# Patient Record
Sex: FEMALE | Race: WHITE | NOT HISPANIC OR LATINO | Employment: OTHER | URBAN - METROPOLITAN AREA
[De-identification: names, ages, dates, MRNs, and addresses within clinical notes are randomized per-mention and may not be internally consistent; named-entity substitution may affect disease eponyms.]

---

## 2017-02-08 ENCOUNTER — OFFICE VISIT (OUTPATIENT)
Dept: FAMILY MEDICINE CLINIC | Facility: CLINIC | Age: 77
End: 2017-02-08

## 2017-02-08 VITALS
HEIGHT: 63 IN | SYSTOLIC BLOOD PRESSURE: 142 MMHG | HEART RATE: 56 BPM | BODY MASS INDEX: 41.29 KG/M2 | TEMPERATURE: 97.9 F | DIASTOLIC BLOOD PRESSURE: 82 MMHG | OXYGEN SATURATION: 97 % | WEIGHT: 233 LBS

## 2017-02-08 DIAGNOSIS — Z74.09 IMPAIRED MOBILITY: ICD-10-CM

## 2017-02-08 DIAGNOSIS — M15.9 GENERALIZED OSTEOARTHRITIS: ICD-10-CM

## 2017-02-08 DIAGNOSIS — E78.5 HYPERLIPIDEMIA, UNSPECIFIED HYPERLIPIDEMIA TYPE: ICD-10-CM

## 2017-02-08 DIAGNOSIS — I10 BENIGN HYPERTENSION: Primary | ICD-10-CM

## 2017-02-08 DIAGNOSIS — E11.9 TYPE 2 DIABETES MELLITUS WITHOUT COMPLICATION, WITHOUT LONG-TERM CURRENT USE OF INSULIN (HCC): ICD-10-CM

## 2017-02-08 DIAGNOSIS — E66.9 ADIPOSITY: ICD-10-CM

## 2017-02-08 DIAGNOSIS — G14 POST POLIOMYELITIS SYNDROME: ICD-10-CM

## 2017-02-08 DIAGNOSIS — E03.9 ACQUIRED HYPOTHYROIDISM: ICD-10-CM

## 2017-02-08 PROCEDURE — G0009 ADMIN PNEUMOCOCCAL VACCINE: HCPCS | Performed by: INTERNAL MEDICINE

## 2017-02-08 PROCEDURE — 90670 PCV13 VACCINE IM: CPT | Performed by: INTERNAL MEDICINE

## 2017-02-08 PROCEDURE — 99203 OFFICE O/P NEW LOW 30 MIN: CPT | Performed by: INTERNAL MEDICINE

## 2017-02-08 PROCEDURE — 85025 COMPLETE CBC W/AUTO DIFF WBC: CPT | Performed by: INTERNAL MEDICINE

## 2017-02-08 RX ORDER — GLIPIZIDE 10 MG/1
10 TABLET ORAL 3 TIMES DAILY
COMMUNITY
Start: 2016-06-07 | End: 2017-03-29

## 2017-02-08 RX ORDER — ATENOLOL 25 MG/1
25 TABLET ORAL 2 TIMES DAILY
COMMUNITY
Start: 2016-01-11 | End: 2018-01-24 | Stop reason: SDUPTHER

## 2017-02-08 RX ORDER — SIMVASTATIN 40 MG
40 TABLET ORAL NIGHTLY
COMMUNITY
Start: 2016-01-11 | End: 2018-01-24 | Stop reason: SDUPTHER

## 2017-02-08 RX ORDER — LOSARTAN POTASSIUM AND HYDROCHLOROTHIAZIDE 12.5; 5 MG/1; MG/1
1 TABLET ORAL DAILY
COMMUNITY
Start: 2016-06-06 | End: 2018-02-12 | Stop reason: SDUPTHER

## 2017-02-08 RX ORDER — BACLOFEN 10 MG/1
10 TABLET ORAL 2 TIMES DAILY
COMMUNITY
End: 2017-02-08

## 2017-02-08 RX ORDER — TRAMADOL HYDROCHLORIDE 50 MG/1
50 TABLET ORAL 2 TIMES DAILY
COMMUNITY
Start: 2015-06-29 | End: 2018-01-24

## 2017-02-08 RX ORDER — PIOGLITAZONEHYDROCHLORIDE 30 MG/1
30 TABLET ORAL DAILY
Qty: 90 TABLET | Refills: 3 | Status: SHIPPED | OUTPATIENT
Start: 2017-02-08 | End: 2017-12-20 | Stop reason: SDUPTHER

## 2017-02-08 RX ORDER — LEVOTHYROXINE SODIUM 0.05 MG/1
50 TABLET ORAL DAILY
COMMUNITY
Start: 2017-01-03 | End: 2018-07-05 | Stop reason: SDUPTHER

## 2017-02-08 RX ORDER — METFORMIN HYDROCHLORIDE 500 MG/1
500 TABLET, EXTENDED RELEASE ORAL 2 TIMES DAILY
COMMUNITY
Start: 2016-03-20 | End: 2018-01-24 | Stop reason: DRUGHIGH

## 2017-02-08 RX ORDER — DIPHENOXYLATE HYDROCHLORIDE AND ATROPINE SULFATE 2.5; .025 MG/1; MG/1
1 TABLET ORAL EVERY 24 HOURS
COMMUNITY
Start: 2016-10-05 | End: 2018-01-24

## 2017-02-08 NOTE — PROGRESS NOTES
Subjective   Alice Parisi is a 76 y.o. female. Patient is here today for establishing care as a new patient.  She formerly was living at home and having medical care supplied by [facility]  visiting her in the home about once a year as well as a nurse practitioner.  That services stopped and she is establishing care here.  She has moved in with her daughter.  Patient has a history of hypertension, obesity, Hyperlipidemia, diabetes mellitus, hypothyroidism, arthritis.  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  She also has obstructive sleep apnea.  She is really not having any major new acute problems.  She does have a muscle relaxant that's not very helpful and has a pain pill that she takes rarely   Chief Complaint   Patient presents with   • Annual Exam   • Establish Care     New Pt           Vitals:    02/08/17 0847   BP: 142/82   Pulse: 56   Temp: 97.9 °F (36.6 °C)   SpO2: 97%     The following portions of the patient's history were reviewed and updated as appropriate: allergies, current medications, past family history, past medical history, past social history, past surgical history and problem list.    Past Medical History   Diagnosis Date   • Arthritis    • Diabetes mellitus    • Hyperlipidemia    • Hypertension    • Incontinence    • Obesity    • Sleep apnea       No Known Allergies   Social History     Social History   • Marital status:      Spouse name: N/A   • Number of children: N/A   • Years of education: N/A     Occupational History   • Not on file.     Social History Main Topics   • Smoking status: Not on file   • Smokeless tobacco: Not on file   • Alcohol use Not on file   • Drug use: Not on file   • Sexual activity: Not on file     Other Topics Concern   • Not on file     Social History Narrative   • No narrative on file        Current Outpatient Prescriptions:   •  atenolol (TENORMIN) 25 MG tablet, Take 25 mg by mouth 2 (Two) Times a  Day., Disp: , Rfl:   •  diphenoxylate-atropine (LOMOTIL) 2.5-0.025 MG per tablet, Take 1 tablet by mouth Daily., Disp: , Rfl:   •  glipiZIDE (GLUCOTROL) 10 MG tablet, Take 10 mg by mouth 3 (Three) Times a Day., Disp: , Rfl:   •  levothyroxine (SYNTHROID, LEVOTHROID) 50 MCG tablet, Take 50 mcg by mouth Daily., Disp: , Rfl:   •  losartan-hydrochlorothiazide (HYZAAR) 50-12.5 MG per tablet, Take 1 tablet by mouth Daily., Disp: , Rfl:   •  metFORMIN ER (GLUCOPHAGE-XR) 500 MG 24 hr tablet, Take 500 mg by mouth 2 (Two) Times a Day., Disp: , Rfl:   •  simvastatin (ZOCOR) 40 MG tablet, Take 40 mg by mouth Every Night., Disp: , Rfl:   •  traMADol (ULTRAM) 50 MG tablet, Take 50 mg by mouth 2 (Two) Times a Day., Disp: , Rfl:   •  pioglitazone (ACTOS) 30 MG tablet, Take 1 tablet by mouth Daily., Disp: 90 tablet, Rfl: 3     Objective     History of Present Illness     Review of Systems   Constitutional: Negative.    HENT: Negative.    Eyes: Negative.    Respiratory: Negative.    Cardiovascular: Negative.    Gastrointestinal: Negative.    Genitourinary: Negative.    Musculoskeletal: Negative.    Skin: Negative.    Neurological: Negative.    Psychiatric/Behavioral: Negative.        Physical Exam   Constitutional: She is oriented to person, place, and time. She appears well-developed and well-nourished.   Pleasant, cooperative but obese and in a wheelchair with a blood pressure 120/80   HENT:   Head: Normocephalic and atraumatic.   Eyes: Conjunctivae are normal. Pupils are equal, round, and reactive to light.   Neck: Normal range of motion. Neck supple. No thyromegaly present.   Cardiovascular: Normal rate, regular rhythm and normal heart sounds.    Pulmonary/Chest: Effort normal and breath sounds normal. No respiratory distress. She has no wheezes. She has no rales.   Abdominal: Soft.   Musculoskeletal: Normal range of motion. She exhibits deformity.   Left leg muscle atrophy from polio as a child   Neurological: She is alert and  oriented to person, place, and time.   Skin: Skin is warm and dry.   Psychiatric: She has a normal mood and affect. Her behavior is normal.   Nursing note and vitals reviewed.      ASSESSMENT  the patient seems stable but has number of previously diagnosed problems.  I'm going to get some laboratory testing started today.       Problem List Items Addressed This Visit        Cardiovascular and Mediastinum    Benign hypertension - Primary    Relevant Medications    atenolol (TENORMIN) 25 MG tablet    losartan-hydrochlorothiazide (HYZAAR) 50-12.5 MG per tablet    Other Relevant Orders    POC CBC With / Auto Diff    Comprehensive Metabolic Panel    Lipid Panel With LDL / HDL Ratio       Digestive    Adiposity       Endocrine    Type 2 diabetes mellitus    Relevant Medications    glipiZIDE (GLUCOTROL) 10 MG tablet    metFORMIN ER (GLUCOPHAGE-XR) 500 MG 24 hr tablet    pioglitazone (ACTOS) 30 MG tablet    Other Relevant Orders    Lipid Panel With LDL / HDL Ratio    Hemoglobin A1c    Microalbumin / Creatinine Urine Ratio    Hypothyroidism    Relevant Medications    levothyroxine (SYNTHROID, LEVOTHROID) 50 MCG tablet    atenolol (TENORMIN) 25 MG tablet    Other Relevant Orders    Lipid Panel With LDL / HDL Ratio    TSH    T4, Free       Nervous and Auditory    Post poliomyelitis syndrome       Musculoskeletal and Integument    Generalized osteoarthritis       Other    Impaired mobility     In wheelchair               PLAN  The patient will continue her current medicines but can stop the baclofen.  She will continue her other diabetes medicines but I'm going to add in some pioglitazone 30 mg daily.  I would like to get her off the glipizide if it's feasible.  Patient will also receive a Prevnar 13 vaccination today and I plan on rechecking her in about 6 weeks with a CMP and hemoglobin A1c      There are no Patient Instructions on file for this visit.  No Follow-up on file.

## 2017-02-09 LAB
ALBUMIN SERPL-MCNC: 3.9 G/DL (ref 3.5–5.2)
ALBUMIN/CREAT UR: 79.4 MG/G CREAT (ref 0–30)
ALBUMIN/GLOB SERPL: 1.4 G/DL
ALP SERPL-CCNC: 66 U/L (ref 39–117)
ALT SERPL-CCNC: 25 U/L (ref 1–33)
AST SERPL-CCNC: 18 U/L (ref 1–32)
BILIRUB SERPL-MCNC: 0.4 MG/DL (ref 0.1–1.2)
BUN SERPL-MCNC: 14 MG/DL (ref 8–23)
BUN/CREAT SERPL: 28.6 (ref 7–25)
CALCIUM SERPL-MCNC: 9.8 MG/DL (ref 8.6–10.5)
CHLORIDE SERPL-SCNC: 101 MMOL/L (ref 98–107)
CHOLEST SERPL-MCNC: 190 MG/DL (ref 0–200)
CO2 SERPL-SCNC: 27.5 MMOL/L (ref 22–29)
CREAT SERPL-MCNC: 0.49 MG/DL (ref 0.57–1)
CREAT UR-MCNC: 23.3 MG/DL
GLOBULIN SER CALC-MCNC: 2.8 GM/DL
GLUCOSE SERPL-MCNC: 108 MG/DL (ref 65–99)
HBA1C MFR BLD: 7.1 % (ref 4.8–5.6)
HDLC SERPL-MCNC: 65 MG/DL (ref 40–60)
LDLC SERPL CALC-MCNC: 88 MG/DL (ref 0–100)
LDLC/HDLC SERPL: 1.36 {RATIO}
MICROALBUMIN UR-MCNC: 18.5 UG/ML
POTASSIUM SERPL-SCNC: 4.3 MMOL/L (ref 3.5–5.2)
PROT SERPL-MCNC: 6.7 G/DL (ref 6–8.5)
SODIUM SERPL-SCNC: 143 MMOL/L (ref 136–145)
T4 FREE SERPL-MCNC: 1.27 NG/DL (ref 0.93–1.7)
TRIGL SERPL-MCNC: 184 MG/DL (ref 0–150)
TSH SERPL DL<=0.005 MIU/L-ACNC: 2.11 MIU/ML (ref 0.27–4.2)
VLDLC SERPL CALC-MCNC: 36.8 MG/DL (ref 5–40)

## 2017-03-13 DIAGNOSIS — E11.9 TYPE 2 DIABETES MELLITUS WITHOUT COMPLICATION, UNSPECIFIED LONG TERM INSULIN USE STATUS: Primary | ICD-10-CM

## 2017-03-22 LAB
ALBUMIN SERPL-MCNC: 4.1 G/DL (ref 3.5–5.2)
ALBUMIN/GLOB SERPL: 1.4 G/DL
ALP SERPL-CCNC: 59 U/L (ref 39–117)
ALT SERPL-CCNC: 22 U/L (ref 1–33)
AST SERPL-CCNC: 21 U/L (ref 1–32)
BILIRUB SERPL-MCNC: 0.3 MG/DL (ref 0.1–1.2)
BUN SERPL-MCNC: 16 MG/DL (ref 8–23)
BUN/CREAT SERPL: 28.6 (ref 7–25)
CALCIUM SERPL-MCNC: 9.9 MG/DL (ref 8.6–10.5)
CHLORIDE SERPL-SCNC: 100 MMOL/L (ref 98–107)
CO2 SERPL-SCNC: 28.1 MMOL/L (ref 22–29)
CREAT SERPL-MCNC: 0.56 MG/DL (ref 0.57–1)
GLOBULIN SER CALC-MCNC: 2.9 GM/DL
GLUCOSE SERPL-MCNC: 94 MG/DL (ref 65–99)
HBA1C MFR BLD: 6.7 % (ref 4.8–5.6)
POTASSIUM SERPL-SCNC: 4.3 MMOL/L (ref 3.5–5.2)
PROT SERPL-MCNC: 7 G/DL (ref 6–8.5)
SODIUM SERPL-SCNC: 143 MMOL/L (ref 136–145)

## 2017-03-29 ENCOUNTER — OFFICE VISIT (OUTPATIENT)
Dept: FAMILY MEDICINE CLINIC | Facility: CLINIC | Age: 77
End: 2017-03-29

## 2017-03-29 VITALS
HEIGHT: 63 IN | WEIGHT: 240.4 LBS | OXYGEN SATURATION: 98 % | SYSTOLIC BLOOD PRESSURE: 138 MMHG | BODY MASS INDEX: 42.59 KG/M2 | DIASTOLIC BLOOD PRESSURE: 68 MMHG | HEART RATE: 56 BPM | TEMPERATURE: 98 F

## 2017-03-29 DIAGNOSIS — E66.9 ADIPOSITY: ICD-10-CM

## 2017-03-29 DIAGNOSIS — E11.9 TYPE 2 DIABETES MELLITUS WITHOUT COMPLICATION, WITHOUT LONG-TERM CURRENT USE OF INSULIN (HCC): ICD-10-CM

## 2017-03-29 DIAGNOSIS — I10 BENIGN HYPERTENSION: Primary | ICD-10-CM

## 2017-03-29 PROCEDURE — 99214 OFFICE O/P EST MOD 30 MIN: CPT | Performed by: INTERNAL MEDICINE

## 2017-03-29 RX ORDER — LANCETS
EACH MISCELLANEOUS
COMMUNITY
Start: 2017-01-13 | End: 2018-01-24 | Stop reason: SDUPTHER

## 2017-03-29 RX ORDER — BLOOD SUGAR DIAGNOSTIC
STRIP MISCELLANEOUS
COMMUNITY
Start: 2017-03-18 | End: 2017-08-02 | Stop reason: SDUPTHER

## 2017-03-29 NOTE — PROGRESS NOTES
Subjective   Alice Parisi is a 77 y.o. female. Patient is here today for follow-up on her hypertension, obesity, diabetes mellitus.  At her initial visit I started her on some pioglitazone.  She's been tolerating the medicine okay.      Chief Complaint   Patient presents with   • Diabetes     6 WEEK FOLLOW UP LABS          Vitals:    03/29/17 0953   BP: 138/68   Pulse: 56   Temp: 98 °F (36.7 °C)   SpO2: 98%     The following portions of the patient's history were reviewed and updated as appropriate: allergies, current medications, past family history, past medical history, past social history, past surgical history and problem list.    Past Medical History:   Diagnosis Date   • Arthritis    • Diabetes mellitus    • Hyperlipidemia    • Hypertension    • Incontinence    • Obesity    • Sleep apnea       No Known Allergies   Social History     Social History   • Marital status:      Spouse name: N/A   • Number of children: N/A   • Years of education: N/A     Occupational History   • Not on file.     Social History Main Topics   • Smoking status: Never Smoker   • Smokeless tobacco: Never Used   • Alcohol use No   • Drug use: Defer   • Sexual activity: Defer     Other Topics Concern   • Not on file     Social History Narrative   • No narrative on file        Current Outpatient Prescriptions:   •  ACCU-CHEK PARRIS PLUS test strip, , Disp: , Rfl:   •  ACCU-CHEK FASTCLIX LANCETS misc, , Disp: , Rfl:   •  atenolol (TENORMIN) 25 MG tablet, Take 25 mg by mouth 2 (Two) Times a Day., Disp: , Rfl:   •  diphenoxylate-atropine (LOMOTIL) 2.5-0.025 MG per tablet, Take 1 tablet by mouth Daily., Disp: , Rfl:   •  levothyroxine (SYNTHROID, LEVOTHROID) 50 MCG tablet, Take 50 mcg by mouth Daily., Disp: , Rfl:   •  losartan-hydrochlorothiazide (HYZAAR) 50-12.5 MG per tablet, Take 1 tablet by mouth Daily., Disp: , Rfl:   •  metFORMIN ER (GLUCOPHAGE-XR) 500 MG 24 hr tablet, Take 500 mg by mouth 2 (Two) Times a Day., Disp: , Rfl:   •   pioglitazone (ACTOS) 30 MG tablet, Take 1 tablet by mouth Daily., Disp: 90 tablet, Rfl: 3  •  simvastatin (ZOCOR) 40 MG tablet, Take 40 mg by mouth Every Night., Disp: , Rfl:   •  traMADol (ULTRAM) 50 MG tablet, Take 50 mg by mouth 2 (Two) Times a Day., Disp: , Rfl:      Objective     History of Present Illness     Review of Systems   Constitutional: Negative.    HENT: Negative.    Eyes: Negative.    Respiratory: Negative.    Cardiovascular: Negative.    Gastrointestinal: Negative.    Genitourinary: Negative.    Musculoskeletal: Negative.    Neurological: Negative.    Psychiatric/Behavioral: Negative.        Physical Exam   Constitutional: She appears well-developed and well-nourished.   Pleasant, cooperative and in no distress with a blood pressure of 130/70   HENT:   Head: Normocephalic and atraumatic.   Eyes: Conjunctivae are normal.   Neck: Normal range of motion. Neck supple.   Cardiovascular: Normal rate, regular rhythm and normal heart sounds.    Pulmonary/Chest: Effort normal and breath sounds normal. No respiratory distress. She has no wheezes. She has no rales.   Musculoskeletal: Normal range of motion. She exhibits no edema.   Neurological: She is alert.   Skin: Skin is warm and dry.   Psychiatric: She has a normal mood and affect. Her behavior is normal.   Nursing note and vitals reviewed.      ASSESSMENT  overall the patient seems stable.  Her blood pressures fine and heart and lungs sound fine.  She's had no breathing problems or significant edema and seems to be tolerating the pioglitazone.  Her CMP was normal as was her sugar.  Her hemoglobin A1c has improved to 6.7.     Problem List Items Addressed This Visit        Cardiovascular and Mediastinum    Benign hypertension - Primary       Digestive    Adiposity       Endocrine    Type 2 diabetes mellitus          PLAN  I'm going to have the patient stop her Glucotrol and continue the pioglitazone.  I would like to recheck her in about 4 months with a  CBC, CMP, NMR lipid panel, hemoglobin A1c, TSH and free T4    There are no Patient Instructions on file for this visit.  Return in about 4 months (around 7/29/2017) for with labs.

## 2017-04-05 ENCOUNTER — TELEPHONE (OUTPATIENT)
Dept: FAMILY MEDICINE CLINIC | Facility: CLINIC | Age: 77
End: 2017-04-05

## 2017-04-05 DIAGNOSIS — Z74.09 IMPAIRED MOBILITY: Primary | ICD-10-CM

## 2017-04-05 NOTE — TELEPHONE ENCOUNTER
ORDER HAS BEEN PUT IN FOR [facility] [place] TO EVAL PATIENT. PT IS AWARE.     ----- Message from Manju Moeller sent at 4/5/2017  1:49 PM EDT -----  EDDIE RUIZ [phone]    TALKING ABOUT A  FOR A RAMP

## 2017-07-26 DIAGNOSIS — E11.9 TYPE 2 DIABETES MELLITUS WITHOUT COMPLICATION, WITHOUT LONG-TERM CURRENT USE OF INSULIN (HCC): ICD-10-CM

## 2017-07-26 DIAGNOSIS — E03.9 ACQUIRED HYPOTHYROIDISM: ICD-10-CM

## 2017-07-26 DIAGNOSIS — E78.5 HYPERLIPIDEMIA, UNSPECIFIED HYPERLIPIDEMIA TYPE: ICD-10-CM

## 2017-08-02 DIAGNOSIS — E11.9 TYPE 2 DIABETES MELLITUS WITHOUT COMPLICATION, WITHOUT LONG-TERM CURRENT USE OF INSULIN (HCC): Primary | ICD-10-CM

## 2017-08-02 RX ORDER — BLOOD SUGAR DIAGNOSTIC
STRIP MISCELLANEOUS
Qty: 200 EACH | Refills: 3 | Status: SHIPPED | OUTPATIENT
Start: 2017-08-02 | End: 2017-08-09 | Stop reason: SDUPTHER

## 2017-08-03 LAB
ALBUMIN SERPL-MCNC: 4.3 G/DL (ref 3.5–5.2)
ALBUMIN/GLOB SERPL: 1.5 G/DL
ALP SERPL-CCNC: 54 U/L (ref 39–117)
ALT SERPL-CCNC: 27 U/L (ref 1–33)
AST SERPL-CCNC: 20 U/L (ref 1–32)
BASOPHILS # BLD AUTO: 0.04 10*3/MM3 (ref 0–0.2)
BASOPHILS NFR BLD AUTO: 0.4 % (ref 0–1.5)
BILIRUB SERPL-MCNC: 0.3 MG/DL (ref 0.1–1.2)
BUN SERPL-MCNC: 20 MG/DL (ref 8–23)
BUN/CREAT SERPL: 34.5 (ref 7–25)
CALCIUM SERPL-MCNC: 10.1 MG/DL (ref 8.6–10.5)
CHLORIDE SERPL-SCNC: 102 MMOL/L (ref 98–107)
CHOLEST SERPL-MCNC: 131 MG/DL (ref 100–199)
CO2 SERPL-SCNC: 28.1 MMOL/L (ref 22–29)
CREAT SERPL-MCNC: 0.58 MG/DL (ref 0.57–1)
DIFFERENTIAL COMMENT: NORMAL
EOSINOPHIL # BLD AUTO: 0.3 10*3/MM3 (ref 0–0.7)
EOSINOPHIL NFR BLD AUTO: 3.2 % (ref 0.3–6.2)
ERYTHROCYTE [DISTWIDTH] IN BLOOD BY AUTOMATED COUNT: 13.7 % (ref 11.7–13)
GLOBULIN SER CALC-MCNC: 2.9 GM/DL
GLUCOSE SERPL-MCNC: 110 MG/DL (ref 65–99)
HBA1C MFR BLD: 6.7 % (ref 4.8–5.6)
HCT VFR BLD AUTO: 42.9 % (ref 35.6–45.5)
HDL SERPL-SCNC: 32.9 UMOL/L
HDLC SERPL-MCNC: 56 MG/DL
HGB BLD-MCNC: 13.9 G/DL (ref 11.9–15.5)
IMM GRANULOCYTES # BLD: 0 10*3/MM3 (ref 0–0.03)
IMM GRANULOCYTES NFR BLD: 0 % (ref 0–0.5)
LDL SERPL QN: 20.6 NM
LDL SERPL-SCNC: 875 NMOL/L
LDL SMALL SERPL-SCNC: 416 NMOL/L
LDLC SERPL CALC-MCNC: 54 MG/DL (ref 0–99)
LYMPHOCYTES # BLD AUTO: 2.78 10*3/MM3 (ref 0.9–4.8)
LYMPHOCYTES NFR BLD AUTO: 29.5 % (ref 19.6–45.3)
MCH RBC QN AUTO: 31.3 PG (ref 26.9–32)
MCHC RBC AUTO-ENTMCNC: 32.4 G/DL (ref 32.4–36.3)
MCV RBC AUTO: 96.6 FL (ref 80.5–98.2)
MONOCYTES # BLD AUTO: 0.95 10*3/MM3 (ref 0.2–1.2)
MONOCYTES NFR BLD AUTO: 10.1 % (ref 5–12)
NEUTROPHILS # BLD AUTO: 5.34 10*3/MM3 (ref 1.9–8.1)
NEUTROPHILS NFR BLD AUTO: 56.8 % (ref 42.7–76)
NRBC BLD AUTO-RTO: 0 /100 WBC (ref 0–0)
PLATELET # BLD AUTO: 196 10*3/MM3 (ref 140–500)
PLATELET BLD QL SMEAR: NORMAL
POTASSIUM SERPL-SCNC: 4.3 MMOL/L (ref 3.5–5.2)
PROT SERPL-MCNC: 7.2 G/DL (ref 6–8.5)
RBC # BLD AUTO: 4.44 10*6/MM3 (ref 3.9–5.2)
RBC MORPH BLD: NORMAL
SODIUM SERPL-SCNC: 144 MMOL/L (ref 136–145)
T4 FREE SERPL-MCNC: 1.61 NG/DL (ref 0.93–1.7)
TRIGL SERPL-MCNC: 107 MG/DL (ref 0–149)
TSH SERPL DL<=0.005 MIU/L-ACNC: 1.9 MIU/ML (ref 0.27–4.2)
WBC # BLD AUTO: 9.41 10*3/MM3 (ref 4.5–10.7)

## 2017-08-09 ENCOUNTER — OFFICE VISIT (OUTPATIENT)
Dept: FAMILY MEDICINE CLINIC | Facility: CLINIC | Age: 77
End: 2017-08-09

## 2017-08-09 VITALS
BODY MASS INDEX: 40.82 KG/M2 | OXYGEN SATURATION: 98 % | WEIGHT: 230.4 LBS | TEMPERATURE: 97.6 F | HEIGHT: 63 IN | SYSTOLIC BLOOD PRESSURE: 108 MMHG | HEART RATE: 52 BPM | DIASTOLIC BLOOD PRESSURE: 66 MMHG

## 2017-08-09 DIAGNOSIS — E78.5 HYPERLIPIDEMIA, UNSPECIFIED HYPERLIPIDEMIA TYPE: Primary | ICD-10-CM

## 2017-08-09 DIAGNOSIS — E03.9 ACQUIRED HYPOTHYROIDISM: ICD-10-CM

## 2017-08-09 DIAGNOSIS — E66.9 ADIPOSITY: ICD-10-CM

## 2017-08-09 DIAGNOSIS — E11.9 TYPE 2 DIABETES MELLITUS WITHOUT COMPLICATION, WITHOUT LONG-TERM CURRENT USE OF INSULIN (HCC): ICD-10-CM

## 2017-08-09 DIAGNOSIS — I10 BENIGN HYPERTENSION: ICD-10-CM

## 2017-08-09 PROCEDURE — 99214 OFFICE O/P EST MOD 30 MIN: CPT | Performed by: INTERNAL MEDICINE

## 2017-08-09 RX ORDER — BLOOD SUGAR DIAGNOSTIC
STRIP MISCELLANEOUS
Qty: 200 EACH | Refills: 3 | Status: SHIPPED | OUTPATIENT
Start: 2017-08-09 | End: 2017-08-10 | Stop reason: SDUPTHER

## 2017-08-09 NOTE — PROGRESS NOTES
Subjective   Alice Parisi is a 77 y.o. female. Patient is here today for follow-up on her hypertension, hyperlipidemia, obesity, diabetes mellitus and hypothyroidism.  Patient's generally been doing well.  At the last visit we stopped her glipizide.  She's been watching her diet and has lost about 10 pounds thus far.  Home blood sugars have been generally about 110 fasting.  She's had no hypoglycemia.  She's had no chest pain, shortness of breath, edema or significant myalgias.    Chief Complaint   Patient presents with   • Hyperlipidemia     lab follow up           Vitals:    08/09/17 0902   BP: 108/66   Pulse: 52   Temp: 97.6 °F (36.4 °C)   SpO2: 98%     The following portions of the patient's history were reviewed and updated as appropriate: allergies, current medications, past family history, past medical history, past social history, past surgical history and problem list.    Past Medical History:   Diagnosis Date   • Arthritis    • Diabetes mellitus    • Hyperlipidemia    • Hypertension    • Incontinence    • Obesity    • Sleep apnea       No Known Allergies   Social History     Social History   • Marital status:      Spouse name: N/A   • Number of children: N/A   • Years of education: N/A     Occupational History   • Not on file.     Social History Main Topics   • Smoking status: Never Smoker   • Smokeless tobacco: Never Used   • Alcohol use No   • Drug use: Defer   • Sexual activity: Defer     Other Topics Concern   • Not on file     Social History Narrative        Current Outpatient Prescriptions:   •  ACCU-CHEK PARRIS PLUS test strip, USE TO TEST TWICE A DAY DX: TYPE 2 DIABETES MELLITUS  ICD 10: E11.9, Disp: 200 each, Rfl: 3  •  ACCU-CHEK FASTCLIX LANCETS misc, , Disp: , Rfl:   •  atenolol (TENORMIN) 25 MG tablet, Take 25 mg by mouth 2 (Two) Times a Day., Disp: , Rfl:   •  diphenoxylate-atropine (LOMOTIL) 2.5-0.025 MG per tablet, Take 1 tablet by mouth Daily., Disp: , Rfl:   •  levothyroxine  (SYNTHROID, LEVOTHROID) 50 MCG tablet, Take 50 mcg by mouth Daily., Disp: , Rfl:   •  losartan-hydrochlorothiazide (HYZAAR) 50-12.5 MG per tablet, Take 1 tablet by mouth Daily., Disp: , Rfl:   •  metFORMIN ER (GLUCOPHAGE-XR) 500 MG 24 hr tablet, Take 500 mg by mouth 2 (Two) Times a Day., Disp: , Rfl:   •  pioglitazone (ACTOS) 30 MG tablet, Take 1 tablet by mouth Daily., Disp: 90 tablet, Rfl: 3  •  simvastatin (ZOCOR) 40 MG tablet, Take 40 mg by mouth Every Night., Disp: , Rfl:   •  traMADol (ULTRAM) 50 MG tablet, Take 50 mg by mouth 2 (Two) Times a Day., Disp: , Rfl:      Objective     History of Present Illness     Review of Systems   Constitutional: Negative.    HENT: Negative.    Eyes: Negative.    Respiratory: Negative.    Cardiovascular: Negative.    Gastrointestinal: Negative.    Genitourinary: Negative.    Musculoskeletal: Negative.    Skin: Negative.    Neurological: Negative.    Psychiatric/Behavioral: Negative.        Physical Exam   Constitutional: She appears well-developed and well-nourished.   Pleasant, cooperative and in no distress with a blood pressure of 120/80   HENT:   Head: Normocephalic and atraumatic.   Eyes: Conjunctivae are normal.   Neck: Normal range of motion. Neck supple. No thyromegaly present.   Cardiovascular: Normal rate, regular rhythm and normal heart sounds.    Pulmonary/Chest: Effort normal and breath sounds normal. No respiratory distress. She has no wheezes. She has no rales.   Musculoskeletal: Normal range of motion. She exhibits no edema.   Neurological: She is alert.   Skin: Skin is warm and dry.   Psychiatric: She has a normal mood and affect. Her behavior is normal.   Nursing note and vitals reviewed.      ASSESSMENT  overall the patient generally seems stable.  Her blood pressure is fine and heart and lungs sound fine.  Patient has lost about 10 pounds in weight.  Her CBC was completely normal.  CMP had an elevated sugar of 110 but otherwise was normal and hemoglobin A1c  is stable at 6.7.  TSH and free T4 were both normal.  Her lipid panels under excellent control with a total cholesterol of 131, HDL 56 LDL 54 and the particle number in the low range.     Problem List Items Addressed This Visit        Cardiovascular and Mediastinum    Benign hypertension    Hyperlipidemia - Primary       Digestive    Adiposity       Endocrine    Type 2 diabetes mellitus    Relevant Medications    ACCU-CHEK PARRIS PLUS test strip    Hypothyroidism          PLAN  The patient will continue current medicines and I like to recheck her in 4 months with a CMP, NMR lipid panel, hemoglobin A1c and TSH    There are no Patient Instructions on file for this visit.  Return in about 4 months (around 12/9/2017) for with labs.

## 2017-08-10 DIAGNOSIS — E11.9 TYPE 2 DIABETES MELLITUS WITHOUT COMPLICATION, WITHOUT LONG-TERM CURRENT USE OF INSULIN (HCC): ICD-10-CM

## 2017-08-10 RX ORDER — BLOOD SUGAR DIAGNOSTIC
STRIP MISCELLANEOUS
Qty: 200 EACH | Refills: 3 | Status: SHIPPED | OUTPATIENT
Start: 2017-08-10 | End: 2017-08-16 | Stop reason: SDUPTHER

## 2017-08-16 DIAGNOSIS — E11.9 TYPE 2 DIABETES MELLITUS WITHOUT COMPLICATION, WITHOUT LONG-TERM CURRENT USE OF INSULIN (HCC): ICD-10-CM

## 2017-08-16 RX ORDER — BLOOD SUGAR DIAGNOSTIC
STRIP MISCELLANEOUS
Qty: 200 EACH | Refills: 3 | Status: SHIPPED | OUTPATIENT
Start: 2017-08-16 | End: 2019-05-13 | Stop reason: SDUPTHER

## 2017-12-20 RX ORDER — PIOGLITAZONEHYDROCHLORIDE 30 MG/1
TABLET ORAL
Qty: 90 TABLET | Refills: 3 | Status: SHIPPED | OUTPATIENT
Start: 2017-12-20 | End: 2018-10-17

## 2018-01-11 DIAGNOSIS — E03.9 ACQUIRED HYPOTHYROIDISM: ICD-10-CM

## 2018-01-11 DIAGNOSIS — E11.9 TYPE 2 DIABETES MELLITUS WITHOUT COMPLICATION, WITHOUT LONG-TERM CURRENT USE OF INSULIN (HCC): ICD-10-CM

## 2018-01-11 DIAGNOSIS — E78.5 HYPERLIPIDEMIA, UNSPECIFIED HYPERLIPIDEMIA TYPE: ICD-10-CM

## 2018-01-19 LAB
ALBUMIN SERPL-MCNC: 4.2 G/DL (ref 3.5–5.2)
ALBUMIN/GLOB SERPL: 1.3 G/DL
ALP SERPL-CCNC: 76 U/L (ref 39–117)
ALT SERPL-CCNC: 20 U/L (ref 1–33)
AST SERPL-CCNC: 18 U/L (ref 1–32)
BILIRUB SERPL-MCNC: 0.4 MG/DL (ref 0.1–1.2)
BUN SERPL-MCNC: 20 MG/DL (ref 8–23)
BUN/CREAT SERPL: 38.5 (ref 7–25)
CALCIUM SERPL-MCNC: 10.4 MG/DL (ref 8.6–10.5)
CHLORIDE SERPL-SCNC: 101 MMOL/L (ref 98–107)
CHOLEST SERPL-MCNC: 189 MG/DL (ref 100–199)
CO2 SERPL-SCNC: 29.2 MMOL/L (ref 22–29)
CREAT SERPL-MCNC: 0.52 MG/DL (ref 0.57–1)
GLOBULIN SER CALC-MCNC: 3.3 GM/DL
GLUCOSE SERPL-MCNC: 102 MG/DL (ref 65–99)
HBA1C MFR BLD: 5.88 % (ref 4.8–5.6)
HDL SERPL-SCNC: 32.4 UMOL/L
HDLC SERPL-MCNC: 70 MG/DL
LDL SERPL QN: 21.4 NM
LDL SERPL-SCNC: 1131 NMOL/L
LDL SMALL SERPL-SCNC: 361 NMOL/L
LDLC SERPL CALC-MCNC: 98 MG/DL (ref 0–99)
POTASSIUM SERPL-SCNC: 4.1 MMOL/L (ref 3.5–5.2)
PROT SERPL-MCNC: 7.5 G/DL (ref 6–8.5)
SODIUM SERPL-SCNC: 143 MMOL/L (ref 136–145)
TRIGL SERPL-MCNC: 106 MG/DL (ref 0–149)
TSH SERPL DL<=0.005 MIU/L-ACNC: 2.95 MIU/ML (ref 0.27–4.2)

## 2018-01-24 ENCOUNTER — OFFICE VISIT (OUTPATIENT)
Dept: FAMILY MEDICINE CLINIC | Facility: CLINIC | Age: 78
End: 2018-01-24

## 2018-01-24 VITALS
DIASTOLIC BLOOD PRESSURE: 70 MMHG | WEIGHT: 227 LBS | TEMPERATURE: 97.8 F | HEIGHT: 63 IN | OXYGEN SATURATION: 98 % | HEART RATE: 52 BPM | BODY MASS INDEX: 40.22 KG/M2 | SYSTOLIC BLOOD PRESSURE: 120 MMHG

## 2018-01-24 DIAGNOSIS — M15.9 GENERALIZED OA: ICD-10-CM

## 2018-01-24 DIAGNOSIS — E03.9 ACQUIRED HYPOTHYROIDISM: ICD-10-CM

## 2018-01-24 DIAGNOSIS — E78.5 HYPERLIPIDEMIA, UNSPECIFIED HYPERLIPIDEMIA TYPE: Primary | ICD-10-CM

## 2018-01-24 DIAGNOSIS — I10 HTN (HYPERTENSION), BENIGN: ICD-10-CM

## 2018-01-24 DIAGNOSIS — IMO0001 CLASS 3 OBESITY DUE TO EXCESS CALORIES WITH SERIOUS COMORBIDITY AND BODY MASS INDEX (BMI) OF 40.0 TO 44.9 IN ADULT: ICD-10-CM

## 2018-01-24 DIAGNOSIS — E11.9 TYPE 2 DIABETES MELLITUS WITHOUT COMPLICATION, WITHOUT LONG-TERM CURRENT USE OF INSULIN (HCC): ICD-10-CM

## 2018-01-24 PROCEDURE — 99214 OFFICE O/P EST MOD 30 MIN: CPT | Performed by: INTERNAL MEDICINE

## 2018-01-24 RX ORDER — SIMVASTATIN 40 MG
40 TABLET ORAL NIGHTLY
Qty: 90 TABLET | Refills: 3 | Status: SHIPPED | OUTPATIENT
Start: 2018-01-24 | End: 2019-02-21 | Stop reason: SDUPTHER

## 2018-01-24 RX ORDER — LANCETS
EACH MISCELLANEOUS
Qty: 200 EACH | Refills: 3 | Status: SHIPPED | OUTPATIENT
Start: 2018-01-24 | End: 2019-04-25 | Stop reason: SDUPTHER

## 2018-01-24 RX ORDER — ATENOLOL 25 MG/1
25 TABLET ORAL 2 TIMES DAILY
Qty: 180 TABLET | Refills: 3 | Status: SHIPPED | OUTPATIENT
Start: 2018-01-24 | End: 2018-12-12 | Stop reason: SDUPTHER

## 2018-01-24 NOTE — PROGRESS NOTES
Subjective   Alice Parisi is a 77 y.o. female. Patient is here today for follow-up on her hypertension, hyperlipidemia, obesity, diabetes mellitus type 2, hypothyroidism.  She generally is been stable and doing well.  She has been watching her diet.  She's been tolerating medications without any problems.   Chief Complaint   Patient presents with   • Diabetes     HLD, HTN, HYPOTHYROIDISM- FOLLOW UP LABS          Vitals:    01/24/18 1040   BP: 120/70   Pulse: 52   Temp: 97.8 °F (36.6 °C)   SpO2: 98%     The following portions of the patient's history were reviewed and updated as appropriate: allergies, current medications, past family history, past medical history, past social history, past surgical history and problem list.    Past Medical History:   Diagnosis Date   • Arthritis    • Diabetes mellitus    • Hyperlipidemia    • Hypertension    • Incontinence    • Obesity    • Sleep apnea       No Known Allergies   Social History     Social History   • Marital status:      Spouse name: N/A   • Number of children: N/A   • Years of education: N/A     Occupational History   • Not on file.     Social History Main Topics   • Smoking status: Never Smoker   • Smokeless tobacco: Never Used   • Alcohol use No   • Drug use: Defer   • Sexual activity: Defer     Other Topics Concern   • Not on file     Social History Narrative        Current Outpatient Prescriptions:   •  ACCU-CHEK PARRIS PLUS test strip, USE TO TEST TWICE A DAY DX: TYPE 2 DIABETES MELLITUS ICD 10: E11.9, Disp: 200 each, Rfl: 3  •  ACCU-CHEK FASTCLIX LANCETS misc, , Disp: , Rfl:   •  atenolol (TENORMIN) 25 MG tablet, Take 1 tablet by mouth 2 (Two) Times a Day., Disp: 180 tablet, Rfl: 3  •  levothyroxine (SYNTHROID, LEVOTHROID) 50 MCG tablet, Take 50 mcg by mouth Daily., Disp: , Rfl:   •  losartan-hydrochlorothiazide (HYZAAR) 50-12.5 MG per tablet, Take 1 tablet by mouth Daily., Disp: , Rfl:   •  metFORMIN (GLUCOPHAGE) 500 MG tablet, Take 1 tablet by mouth  Daily With Breakfast., Disp: 90 tablet, Rfl: 3  •  pioglitazone (ACTOS) 30 MG tablet, TAKE 1 TABLET DAILY, Disp: 90 tablet, Rfl: 3  •  simvastatin (ZOCOR) 40 MG tablet, Take 1 tablet by mouth Every Night., Disp: 90 tablet, Rfl: 3     Objective     History of Present Illness     Review of Systems   Constitutional: Negative.    HENT: Negative.    Eyes: Negative.    Respiratory: Negative.    Cardiovascular: Negative.    Gastrointestinal: Negative.    Endocrine: Negative.    Genitourinary: Negative.    Musculoskeletal: Positive for gait problem.        She has arthritis, obesity and post polio syndrome and uses a wheelchair   Skin: Negative.    Psychiatric/Behavioral: Negative.        Physical Exam   Constitutional: She is oriented to person, place, and time. She appears well-developed and well-nourished.   Pleasant, cooperative no distress and afebrile   HENT:   Head: Normocephalic and atraumatic.   Eyes: Conjunctivae are normal. Pupils are equal, round, and reactive to light. No scleral icterus.   Neck: Normal range of motion. Neck supple. No thyromegaly present.   Cardiovascular: Normal rate, regular rhythm and normal heart sounds.    Pulmonary/Chest: Effort normal and breath sounds normal. No respiratory distress. She has no wheezes. She has no rales.   Musculoskeletal: Normal range of motion. She exhibits no edema.   Lymphadenopathy:     She has no cervical adenopathy.   Neurological: She is alert and oriented to person, place, and time.   Skin: Skin is warm and dry.   Psychiatric: She has a normal mood and affect. Her behavior is normal.   Nursing note and vitals reviewed.      ASSESSMENT  CMP has a sugar of 102 and otherwise is essentially normal.  Hemoglobin A1c is much improved at 5.88 and acceptable.  TSH was quite normal.  NMR lipid panels acceptable with a total cholesterol 189, HDL of 70, LDL 98 with the particle number in the moderate range 1131.  #1-hypertension, reasonable control  #2-hyperlipidemia,  adequately controlled on medication  #3-diabetes mellitus type 2, good control on medicines  #4-hypothyroidism, euthyroid on replacement  #5-obesity, stable     Problem List Items Addressed This Visit        Cardiovascular and Mediastinum    HTN (hypertension), benign    Relevant Medications    atenolol (TENORMIN) 25 MG tablet    Hyperlipidemia - Primary    Relevant Medications    simvastatin (ZOCOR) 40 MG tablet       Digestive    Obesity       Endocrine    DM2 (diabetes mellitus, type 2)    Relevant Medications    metFORMIN (GLUCOPHAGE) 500 MG tablet    Hypothyroid    Relevant Medications    atenolol (TENORMIN) 25 MG tablet       Musculoskeletal and Integument    Generalized OA          PLAN  The patient will continue her current medicines as now.  I'd like to recheck her in 4 months with a CMP, NMR lipid panel, hemoglobin A1c and TSH      There are no Patient Instructions on file for this visit.  Return in about 4 months (around 5/24/2018) for with labs.

## 2018-02-12 RX ORDER — LOSARTAN POTASSIUM AND HYDROCHLOROTHIAZIDE 12.5; 5 MG/1; MG/1
1 TABLET ORAL DAILY
Qty: 90 TABLET | Refills: 3 | Status: SHIPPED | OUTPATIENT
Start: 2018-02-12 | End: 2019-02-21 | Stop reason: SDUPTHER

## 2018-03-06 ENCOUNTER — TELEPHONE (OUTPATIENT)
Dept: FAMILY MEDICINE CLINIC | Facility: CLINIC | Age: 78
End: 2018-03-06

## 2018-03-06 NOTE — TELEPHONE ENCOUNTER
CALLED AND S/W PT AND ADVISED WHAT DR. MILLAN SAID. PT VOICED UNDERSTANDING.     ----- Message from Geronimo Millan MD sent at 3/6/2018  8:06 AM EST -----  Contact: PATIENT  Elevate and avoid salt.  Needs to be seen if persists.        ----- Message -----     From: Neelima Abraham MA     Sent: 3/5/2018  11:35 AM       To: MD DR. MASON Lobo,  PATIENT IS HAVING SWELLING AND PAIN IN HER RIGHT FOOT, BELIEVES WATER RETENTION- AND SHE CAN'T COME IN FOR AN APPOINTMENT. SHE IS WONDERING WHAT SHE SHOULD DO FOR THIS SINCE SHE CAN'T COME IN FOR APPT? PLEASE ADVISE. THANK YOU.   ALLERGIES: NONE  PHARMACY: to be

## 2018-03-07 ENCOUNTER — OFFICE VISIT (OUTPATIENT)
Dept: FAMILY MEDICINE CLINIC | Facility: CLINIC | Age: 78
End: 2018-03-07

## 2018-03-07 VITALS
SYSTOLIC BLOOD PRESSURE: 120 MMHG | OXYGEN SATURATION: 98 % | DIASTOLIC BLOOD PRESSURE: 66 MMHG | WEIGHT: 227 LBS | BODY MASS INDEX: 40.22 KG/M2 | TEMPERATURE: 97.4 F | HEART RATE: 61 BPM | HEIGHT: 63 IN

## 2018-03-07 DIAGNOSIS — Z87.39 HISTORY OF GOUT: ICD-10-CM

## 2018-03-07 DIAGNOSIS — M79.672 FOOT PAIN, LEFT: Primary | ICD-10-CM

## 2018-03-07 PROCEDURE — 99214 OFFICE O/P EST MOD 30 MIN: CPT | Performed by: INTERNAL MEDICINE

## 2018-03-07 PROCEDURE — 73630 X-RAY EXAM OF FOOT: CPT | Performed by: INTERNAL MEDICINE

## 2018-03-07 NOTE — PROGRESS NOTES
Subjective   Alice Parisi is a 78 y.o. female. Patient is here today for pain and swelling that started in her right foot 2 days ago.  There is been no known injury.  Patient is disabled and uses her right foot almost exclusively.  She has pain in the toe area about fourth toe.  There is some swelling slight pinkness.  She also says she has a history of gout several years ago but has not been on any medication  Chief Complaint   Patient presents with   • Lower Extremity Issue     PT HAVING PAIN AND SWELLING IN RIGHT FOOT- PT SAID SORE           Vitals:    03/07/18 1458   BP: 120/66   Pulse: 61   Temp: 97.4 °F (36.3 °C)   SpO2: 98%     The following portions of the patient's history were reviewed and updated as appropriate: allergies, current medications, past family history, past medical history, past social history, past surgical history and problem list.    Past Medical History:   Diagnosis Date   • Arthritis    • Diabetes mellitus    • Hyperlipidemia    • Hypertension    • Incontinence    • Obesity    • Sleep apnea       No Known Allergies   Social History     Social History   • Marital status:      Spouse name: N/A   • Number of children: N/A   • Years of education: N/A     Occupational History   • Not on file.     Social History Main Topics   • Smoking status: Never Smoker   • Smokeless tobacco: Never Used   • Alcohol use No   • Drug use: Defer   • Sexual activity: Defer     Other Topics Concern   • Not on file     Social History Narrative        Current Outpatient Prescriptions:   •  ACCU-CHEK PARRIS PLUS test strip, USE TO TEST TWICE A DAY DX: TYPE 2 DIABETES MELLITUS ICD 10: E11.9, Disp: 200 each, Rfl: 3  •  ACCU[place] [facility] [place] TO [place] TWICE DAILY DIAGNOSIS: E11.9, Disp: 200 each, Rfl: 3  •  atenolol (TENORMIN) 25 MG tablet, Take 1 tablet by mouth 2 (Two) Times a Day., Disp: 180 tablet, Rfl: 3  •  levothyroxine (SYNTHROID, LEVOTHROID) 50 MCG tablet, Take 50 mcg by mouth Daily., Disp: ,  Rfl:   •  losartan-hydrochlorothiazide (HYZAAR) 50-12.5 MG per tablet, Take 1 tablet by mouth Daily., Disp: 90 tablet, Rfl: 3  •  metFORMIN (GLUCOPHAGE) 500 MG tablet, Take 1 tablet by mouth Daily With Breakfast., Disp: 90 tablet, Rfl: 3  •  pioglitazone (ACTOS) 30 MG tablet, TAKE 1 TABLET DAILY, Disp: 90 tablet, Rfl: 3  •  simvastatin (ZOCOR) 40 MG tablet, Take 1 tablet by mouth Every Night., Disp: 90 tablet, Rfl: 3     Objective     History of Present Illness     Review of Systems   Constitutional: Negative.    HENT: Negative.    Eyes: Negative.    Respiratory: Negative.    Cardiovascular: Negative.    Gastrointestinal: Negative.    Genitourinary: Negative.    Musculoskeletal: Negative.    Skin:        Swelling with some very mild erythema in the dorsal lateral right foot   Neurological: Negative.    Psychiatric/Behavioral: Negative.        Physical Exam   Constitutional: She appears well-developed and well-nourished.   Pleasant, cooperative no distress but with right foot pain   HENT:   Head: Normocephalic and atraumatic.   Eyes: Conjunctivae are normal. Pupils are equal, round, and reactive to light. No scleral icterus.   Cardiovascular: Normal rate, regular rhythm and normal heart sounds.    Pulmonary/Chest: Effort normal and breath sounds normal.   Musculoskeletal: She exhibits edema and tenderness.   There is some swelling with some slight pinkness the dorsum of the right foot in the area of the fourth and fifth metatarsals.  There is tenderness to palpation in that same area.  The toes don't seem to be swollen.   Neurological: She is alert.   Skin: Skin is warm and dry. There is erythema.   Psychiatric: She has a normal mood and affect. Her behavior is normal.   Nursing note and vitals reviewed.  Procedures    ASSESSMENT  x-ray of the right foot shows no acute fractures that I can see.  I don't believe the patient has a cellulitis and this may well be a gout flare.     Problem List Items Addressed This Visit         Nervous and Auditory    Foot pain, left - Primary    Relevant Orders    CBC & Differential    Sedimentation Rate    XR Foot 3+ View Right (In Office)       Other    History of gout    Relevant Orders    Comprehensive Metabolic Panel    Uric Acid    Sedimentation Rate    XR Foot 3+ View Right (In Office)          PLAN  The patient is having laboratory tests drawn today.  I'm going to have her rest the foot, warm soaks, and she will use ibuprofen 200 mg, 2 tablets 3 times a day with meals and can also use some supplemental Zantac to protect his stomach.  I want to recheck her in about one week.    There are no Patient Instructions on file for this visit.  No Follow-up on file.

## 2018-03-08 LAB
ALBUMIN SERPL-MCNC: 4.4 G/DL (ref 3.5–5.2)
ALBUMIN/GLOB SERPL: 1.6 G/DL
ALP SERPL-CCNC: 69 U/L (ref 39–117)
ALT SERPL-CCNC: 14 U/L (ref 1–33)
AST SERPL-CCNC: 17 U/L (ref 1–32)
BASOPHILS # BLD AUTO: 0.05 10*3/MM3 (ref 0–0.2)
BASOPHILS NFR BLD AUTO: 0.4 % (ref 0–1.5)
BILIRUB SERPL-MCNC: 0.4 MG/DL (ref 0.1–1.2)
BUN SERPL-MCNC: 21 MG/DL (ref 8–23)
BUN/CREAT SERPL: 34.4 (ref 7–25)
CALCIUM SERPL-MCNC: 9.7 MG/DL (ref 8.6–10.5)
CHLORIDE SERPL-SCNC: 101 MMOL/L (ref 98–107)
CO2 SERPL-SCNC: 29.9 MMOL/L (ref 22–29)
CREAT SERPL-MCNC: 0.61 MG/DL (ref 0.57–1)
EOSINOPHIL # BLD AUTO: 0.55 10*3/MM3 (ref 0–0.7)
EOSINOPHIL NFR BLD AUTO: 4.8 % (ref 0.3–6.2)
ERYTHROCYTE [DISTWIDTH] IN BLOOD BY AUTOMATED COUNT: 14.6 % (ref 11.7–13)
ERYTHROCYTE [SEDIMENTATION RATE] IN BLOOD BY WESTERGREN METHOD: 47 MM/HR (ref 0–30)
GFR SERPLBLD CREATININE-BSD FMLA CKD-EPI: 115 ML/MIN/1.73
GFR SERPLBLD CREATININE-BSD FMLA CKD-EPI: 95 ML/MIN/1.73
GLOBULIN SER CALC-MCNC: 2.8 GM/DL
GLUCOSE SERPL-MCNC: 107 MG/DL (ref 65–99)
HCT VFR BLD AUTO: 42.6 % (ref 35.6–45.5)
HGB BLD-MCNC: 13.9 G/DL (ref 11.9–15.5)
IMM GRANULOCYTES # BLD: 0.02 10*3/MM3 (ref 0–0.03)
IMM GRANULOCYTES NFR BLD: 0.2 % (ref 0–0.5)
LYMPHOCYTES # BLD AUTO: 3.09 10*3/MM3 (ref 0.9–4.8)
LYMPHOCYTES NFR BLD AUTO: 27 % (ref 19.6–45.3)
MCH RBC QN AUTO: 31.9 PG (ref 26.9–32)
MCHC RBC AUTO-ENTMCNC: 32.6 G/DL (ref 32.4–36.3)
MCV RBC AUTO: 97.7 FL (ref 80.5–98.2)
MONOCYTES # BLD AUTO: 1.27 10*3/MM3 (ref 0.2–1.2)
MONOCYTES NFR BLD AUTO: 11.1 % (ref 5–12)
NEUTROPHILS # BLD AUTO: 6.46 10*3/MM3 (ref 1.9–8.1)
NEUTROPHILS NFR BLD AUTO: 56.5 % (ref 42.7–76)
PLATELET # BLD AUTO: 182 10*3/MM3 (ref 140–500)
POTASSIUM SERPL-SCNC: 4 MMOL/L (ref 3.5–5.2)
PROT SERPL-MCNC: 7.2 G/DL (ref 6–8.5)
RBC # BLD AUTO: 4.36 10*6/MM3 (ref 3.9–5.2)
SODIUM SERPL-SCNC: 142 MMOL/L (ref 136–145)
URATE SERPL-MCNC: 5.3 MG/DL (ref 2.4–5.7)
WBC # BLD AUTO: 11.44 10*3/MM3 (ref 4.5–10.7)

## 2018-03-14 ENCOUNTER — OFFICE VISIT (OUTPATIENT)
Dept: FAMILY MEDICINE CLINIC | Facility: CLINIC | Age: 78
End: 2018-03-14

## 2018-03-14 VITALS
DIASTOLIC BLOOD PRESSURE: 70 MMHG | BODY MASS INDEX: 40.22 KG/M2 | SYSTOLIC BLOOD PRESSURE: 120 MMHG | WEIGHT: 227 LBS | OXYGEN SATURATION: 96 % | HEART RATE: 60 BPM | HEIGHT: 63 IN | TEMPERATURE: 97.8 F

## 2018-03-14 DIAGNOSIS — Z87.39 HISTORY OF GOUT: ICD-10-CM

## 2018-03-14 DIAGNOSIS — Z74.09 IMPAIRED MOBILITY: Primary | ICD-10-CM

## 2018-03-14 DIAGNOSIS — M79.671 FOOT PAIN, RIGHT: ICD-10-CM

## 2018-03-14 PROCEDURE — 99213 OFFICE O/P EST LOW 20 MIN: CPT | Performed by: INTERNAL MEDICINE

## 2018-03-14 NOTE — PROGRESS NOTES
Subjective   Alice Parisi is a 78 y.o. female. Patient is here today for pain, swelling and some erythema and her right foot at her last visit.  Labs were drawn and she was started on ibuprofen.  She's here for recheck.  Her foot dramatically improved with no redness or tenderness and the swelling markedly decreased.  Patient did have a history of gout.   Chief Complaint   Patient presents with   • Foot Pain     PT HERE FOR FOLLOW UP ON FOOT PAIN- GO OVER LABS DRAWN AT LAST VISIT           Vitals:    03/14/18 1356   BP: 120/70   Pulse: 60   Temp: 97.8 °F (36.6 °C)   SpO2: 96%     The following portions of the patient's history were reviewed and updated as appropriate: allergies, current medications, past family history, past medical history, past social history, past surgical history and problem list.    Past Medical History:   Diagnosis Date   • Arthritis    • Diabetes mellitus    • Hyperlipidemia    • Hypertension    • Incontinence    • Obesity    • Sleep apnea       No Known Allergies   Social History     Social History   • Marital status:      Spouse name: N/A   • Number of children: N/A   • Years of education: N/A     Occupational History   • Not on file.     Social History Main Topics   • Smoking status: Never Smoker   • Smokeless tobacco: Never Used   • Alcohol use No   • Drug use: Unknown   • Sexual activity: Defer     Other Topics Concern   • Not on file     Social History Narrative   • No narrative on file        Current Outpatient Prescriptions:   •  ACCU-CHEK PARRIS PLUS test strip, USE TO TEST TWICE A DAY DX: TYPE 2 DIABETES MELLITUS ICD 10: E11.9, Disp: 200 each, Rfl: 3  •  ACCU-CHEK FASTCLIX LANCETS [facility] [place], USE TO TEST TWICE DAILY DIAGNOSIS: E11.9, Disp: 200 each, Rfl: 3  •  atenolol (TENORMIN) 25 MG tablet, Take 1 tablet by mouth 2 (Two) Times a Day., Disp: 180 tablet, Rfl: 3  •  levothyroxine (SYNTHROID, LEVOTHROID) 50 MCG tablet, Take 50 mcg by mouth Daily., Disp: , Rfl:   •   losartan-hydrochlorothiazide (HYZAAR) 50-12.5 MG per tablet, Take 1 tablet by mouth Daily., Disp: 90 tablet, Rfl: 3  •  metFORMIN (GLUCOPHAGE) 500 MG tablet, Take 1 tablet by mouth Daily With Breakfast., Disp: 90 tablet, Rfl: 3  •  pioglitazone (ACTOS) 30 MG tablet, TAKE 1 TABLET DAILY, Disp: 90 tablet, Rfl: 3  •  simvastatin (ZOCOR) 40 MG tablet, Take 1 tablet by mouth Every Night., Disp: 90 tablet, Rfl: 3     Objective     History of Present Illness     Review of Systems   Constitutional: Negative.    HENT: Negative.    Eyes: Negative.    Respiratory: Negative.    Cardiovascular: Negative.    Gastrointestinal: Negative.    Endocrine: Negative.    Genitourinary: Negative.    Musculoskeletal:        Right foot pain and swelling   Skin: Negative.    Neurological: Negative.    Psychiatric/Behavioral: Negative.        Physical Exam   Constitutional: She is oriented to person, place, and time. She appears well-developed and well-nourished.   Pleasant, cooperative no distress and her right foot pain gone   HENT:   Head: Normocephalic and atraumatic.   Cardiovascular: Normal rate, regular rhythm and normal heart sounds.    Pulmonary/Chest: Effort normal and breath sounds normal.   Musculoskeletal: Normal range of motion.   The pain, swelling and redness in her right foot have completely resolved.  I see no edema at all.   Neurological: She is alert and oriented to person, place, and time.   Skin: Skin is warm and dry. No erythema.   Psychiatric: She has a normal mood and affect. Her behavior is normal.   Nursing note and vitals reviewed.      ASSESSMENT  CBC had an elevated white cell count of 11,000 with a normal differential, CMP had a sugar of 107 and was otherwise normal.  Sedimentation rate was high at 47, consistent with some inflammation and her uric acid level was normal at 5.3, somewhat against an acute gout attack.  #1-right foot pain, swelling and erythema consistent with an acute arthritic attack or possibly  gout, despite the normal uric acid.  The patient's foot has returned to normal and the inflammation has subsided.     Problem List Items Addressed This Visit     None      Visit Diagnoses    None.         PLAN   The patient can continue to use ibuprofen as needed to control any joint problems.  If she has another marked flare, I want her to return.  She is scheduled for follow-up in May with laboratory studies and will keep that appointment.    There are no Patient Instructions on file for this visit.  No Follow-up on file.

## 2018-05-11 DIAGNOSIS — E78.5 HYPERLIPIDEMIA, UNSPECIFIED HYPERLIPIDEMIA TYPE: ICD-10-CM

## 2018-05-11 DIAGNOSIS — E11.9 TYPE 2 DIABETES MELLITUS WITHOUT COMPLICATION, WITHOUT LONG-TERM CURRENT USE OF INSULIN (HCC): ICD-10-CM

## 2018-05-11 DIAGNOSIS — E03.9 ACQUIRED HYPOTHYROIDISM: ICD-10-CM

## 2018-05-18 LAB
ALBUMIN SERPL-MCNC: 4 G/DL (ref 3.5–5.2)
ALBUMIN/GLOB SERPL: 1.5 G/DL
ALP SERPL-CCNC: 73 U/L (ref 39–117)
ALT SERPL-CCNC: 20 U/L (ref 1–33)
AST SERPL-CCNC: 18 U/L (ref 1–32)
BILIRUB SERPL-MCNC: 0.4 MG/DL (ref 0.1–1.2)
BUN SERPL-MCNC: 19 MG/DL (ref 8–23)
BUN/CREAT SERPL: 33.9 (ref 7–25)
CALCIUM SERPL-MCNC: 9.5 MG/DL (ref 8.6–10.5)
CHLORIDE SERPL-SCNC: 101 MMOL/L (ref 98–107)
CHOLEST SERPL-MCNC: 177 MG/DL (ref 100–199)
CO2 SERPL-SCNC: 27.6 MMOL/L (ref 22–29)
CREAT SERPL-MCNC: 0.56 MG/DL (ref 0.57–1)
GFR SERPLBLD CREATININE-BSD FMLA CKD-EPI: 105 ML/MIN/1.73
GFR SERPLBLD CREATININE-BSD FMLA CKD-EPI: 127 ML/MIN/1.73
GLOBULIN SER CALC-MCNC: 2.6 GM/DL
GLUCOSE SERPL-MCNC: 98 MG/DL (ref 65–99)
HBA1C MFR BLD: 6.04 % (ref 4.8–5.6)
HDL SERPL-SCNC: 32.8 UMOL/L
HDLC SERPL-MCNC: 57 MG/DL
LDL SERPL QN: 22.3 NM
LDL SERPL-SCNC: 882 NMOL/L
LDL SMALL SERPL-SCNC: 90 NMOL/L
LDLC SERPL CALC-MCNC: 91 MG/DL (ref 0–99)
MICROALBUMIN UR-MCNC: 7 UG/ML
POTASSIUM SERPL-SCNC: 4 MMOL/L (ref 3.5–5.2)
PROT SERPL-MCNC: 6.6 G/DL (ref 6–8.5)
SODIUM SERPL-SCNC: 142 MMOL/L (ref 136–145)
TRIGL SERPL-MCNC: 145 MG/DL (ref 0–149)
TSH SERPL DL<=0.005 MIU/L-ACNC: 2.81 MIU/ML (ref 0.27–4.2)

## 2018-05-23 ENCOUNTER — OFFICE VISIT (OUTPATIENT)
Dept: FAMILY MEDICINE CLINIC | Facility: CLINIC | Age: 78
End: 2018-05-23

## 2018-05-23 VITALS
BODY MASS INDEX: 42.1 KG/M2 | TEMPERATURE: 97.9 F | DIASTOLIC BLOOD PRESSURE: 70 MMHG | HEART RATE: 61 BPM | SYSTOLIC BLOOD PRESSURE: 126 MMHG | HEIGHT: 63 IN | OXYGEN SATURATION: 97 % | WEIGHT: 237.6 LBS

## 2018-05-23 DIAGNOSIS — G14 POST-POLIO SYNDROME: ICD-10-CM

## 2018-05-23 DIAGNOSIS — E78.5 HYPERLIPIDEMIA, UNSPECIFIED HYPERLIPIDEMIA TYPE: Primary | ICD-10-CM

## 2018-05-23 DIAGNOSIS — IMO0001 CLASS 3 OBESITY DUE TO EXCESS CALORIES WITH SERIOUS COMORBIDITY AND BODY MASS INDEX (BMI) OF 40.0 TO 44.9 IN ADULT: ICD-10-CM

## 2018-05-23 DIAGNOSIS — F32.A DEPRESSION, UNSPECIFIED DEPRESSION TYPE: ICD-10-CM

## 2018-05-23 DIAGNOSIS — E03.9 ACQUIRED HYPOTHYROIDISM: ICD-10-CM

## 2018-05-23 DIAGNOSIS — R60.9 DEPENDENT EDEMA: ICD-10-CM

## 2018-05-23 DIAGNOSIS — Z74.09 IMPAIRED MOBILITY: ICD-10-CM

## 2018-05-23 DIAGNOSIS — E11.9 TYPE 2 DIABETES MELLITUS WITHOUT COMPLICATION, WITHOUT LONG-TERM CURRENT USE OF INSULIN (HCC): ICD-10-CM

## 2018-05-23 DIAGNOSIS — I10 HTN (HYPERTENSION), BENIGN: ICD-10-CM

## 2018-05-23 PROCEDURE — 99214 OFFICE O/P EST MOD 30 MIN: CPT | Performed by: INTERNAL MEDICINE

## 2018-05-23 PROCEDURE — 90732 PPSV23 VACC 2 YRS+ SUBQ/IM: CPT | Performed by: INTERNAL MEDICINE

## 2018-05-23 PROCEDURE — G0009 ADMIN PNEUMOCOCCAL VACCINE: HCPCS | Performed by: INTERNAL MEDICINE

## 2018-05-23 RX ORDER — CITALOPRAM 10 MG/1
10 TABLET ORAL DAILY
Qty: 30 TABLET | Refills: 1 | Status: SHIPPED | OUTPATIENT
Start: 2018-05-23 | End: 2018-05-23 | Stop reason: SDUPTHER

## 2018-05-23 RX ORDER — CITALOPRAM 10 MG/1
10 TABLET ORAL DAILY
Qty: 90 TABLET | Refills: 1 | Status: SHIPPED | OUTPATIENT
Start: 2018-05-23 | End: 2018-07-05 | Stop reason: SDUPTHER

## 2018-05-23 RX ORDER — FUROSEMIDE 20 MG/1
20 TABLET ORAL DAILY PRN
Qty: 30 TABLET | Refills: 1 | Status: SHIPPED | OUTPATIENT
Start: 2018-05-23 | End: 2018-05-23 | Stop reason: SDUPTHER

## 2018-05-23 RX ORDER — FUROSEMIDE 20 MG/1
TABLET ORAL
Qty: 90 TABLET | Refills: 1 | Status: SHIPPED | OUTPATIENT
Start: 2018-05-23 | End: 2018-07-05 | Stop reason: SDUPTHER

## 2018-05-23 NOTE — PROGRESS NOTES
Elisa Parisi is a 78 y.o. female. Patient is here today for follow-up on her hypertension, hyperlipidemia, obesity, diabetes mellitus type 2, hypothyroidism.  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  She complains of some increasing edema in her feet and also depression..  She's not suicidal.  She's had no chest pain, shortness of breath, edema or myalgias.  Chief Complaint   Patient presents with   • Diabetes     HLD, HTN, HYPOTHYROIDISM- FOLLOW UP LABS          Vitals:    05/23/18 0847   BP: 126/70   Pulse: 61   Temp: 97.9 °F (36.6 °C)   SpO2: 97%     The following portions of the patient's history were reviewed and updated as appropriate: allergies, current medications, past family history, past medical history, past social history, past surgical history and problem list.    Past Medical History:   Diagnosis Date   • Arthritis    • Diabetes mellitus    • Hyperlipidemia    • Hypertension    • Incontinence    • Obesity    • Sleep apnea       No Known Allergies   Social History     Social History   • Marital status:      Spouse name: N/A   • Number of children: N/A   • Years of education: N/A     Occupational History   • Not on file.     Social History Main Topics   • Smoking status: Never Smoker   • Smokeless tobacco: Never Used   • Alcohol use No   • Drug use: Unknown   • Sexual activity: Defer     Other Topics Concern   • Not on file     Social History Narrative   • No narrative on file        Current Outpatient Prescriptions:   •  ACCU-CHEK PARRIS PLUS test strip, USE TO TEST TWICE A DAY DX: TYPE 2 DIABETES MELLITUS ICD 10: E11.9, Disp: 200 each, Rfl: 3  •  [place] [facility] [place] TO [place] TWICE DAILY DIAGNOSIS: E11.9, Disp: 200 each, Rfl: 3  •  atenolol (TENORMIN) 25 MG tablet, Take 1 tablet by mouth 2 (Two) Times a Day., Disp: 180 tablet, Rfl: 3  •  levothyroxine (SYNTHROID, LEVOTHROID) 50 MCG tablet, Take 50 mcg by mouth Daily., Disp: , Rfl:   •   losartan-hydrochlorothiazide (HYZAAR) 50-12.5 MG per tablet, Take 1 tablet by mouth Daily., Disp: 90 tablet, Rfl: 3  •  metFORMIN (GLUCOPHAGE) 500 MG tablet, Take 1 tablet by mouth Daily With Breakfast., Disp: 90 tablet, Rfl: 3  •  pioglitazone (ACTOS) 30 MG tablet, TAKE 1 TABLET DAILY, Disp: 90 tablet, Rfl: 3  •  simvastatin (ZOCOR) 40 MG tablet, Take 1 tablet by mouth Every Night., Disp: 90 tablet, Rfl: 3  •  citalopram (CeleXA) 10 MG tablet, Take 1 tablet by mouth Daily., Disp: 30 tablet, Rfl: 1  •  furosemide (LASIX) 20 MG tablet, Take 1 tablet by mouth Daily As Needed (SWELLING)., Disp: 30 tablet, Rfl: 1     Objective     History of Present Illness     Review of Systems   Constitutional: Negative.    HENT: Negative.    Eyes: Negative.    Respiratory: Negative.    Cardiovascular: Positive for leg swelling.   Gastrointestinal: Negative.    Genitourinary: Negative.    Musculoskeletal: Positive for gait problem.   Skin: Negative.    Psychiatric/Behavioral: Negative.        Physical Exam   Constitutional: She is oriented to person, place, and time. She appears well-developed and well-nourished.   Pleasant, cooperative no distress blood pressure 130/80   HENT:   Head: Normocephalic and atraumatic.   Eyes: Conjunctivae are normal. Pupils are equal, round, and reactive to light. No scleral icterus.   Neck: Normal range of motion. Neck supple.   Cardiovascular: Normal rate, regular rhythm and normal heart sounds.    Pulmonary/Chest: Effort normal and breath sounds normal. No respiratory distress. She has no wheezes. She has no rales.   Musculoskeletal: Normal range of motion. She exhibits edema.   Trace to 1+ pedal edema   Neurological: She is alert and oriented to person, place, and time.   Skin: Skin is warm and dry.   Psychiatric: She has a normal mood and affect. Her behavior is normal.   Nursing note and vitals reviewed.      ASSESSMENT  CMP is essentially normal and hemoglobin A1c is acceptable at 6.04.  Urine  microalbumin was normal and TSH was normal.  Her lipid panel is well controlled with a total cholesterol 177, HDL 57, LDL 91 and the particle number in the low range.  #1- hypertension, adequate control   #2-hyperlipidemia, well controlled on medication  #3-obesity, no significant change  #4-diabetes mellitus type 2, adequate control on medications  #5-hypothyroidism, euthyroid on replacement  #6-dependent edema, mild  #7-depression, not suicidal         Problem List Items Addressed This Visit        Cardiovascular and Mediastinum    HTN (hypertension), benign    Relevant Medications    furosemide (LASIX) 20 MG tablet    Hyperlipidemia - Primary       Digestive    Obesity       Endocrine    DM2 (diabetes mellitus, type 2)    Hypothyroid       Nervous and Auditory    Post-polio syndrome       Other    Impaired mobility    Depression    Relevant Medications    citalopram (CeleXA) 10 MG tablet          PLAN  I'm going to try the patient on Celexa 10 mg daily and started her on furosemide 20 mg in the morning for the edema.  The patient received a Pneumovax 23 immunization today and I recommended hepatitis A immunization.  I want to recheck the patient in one month to see how her depression and edema are doing.      There are no Patient Instructions on file for this visit.  No Follow-up on file.

## 2018-07-05 ENCOUNTER — OFFICE VISIT (OUTPATIENT)
Dept: FAMILY MEDICINE CLINIC | Facility: CLINIC | Age: 78
End: 2018-07-05

## 2018-07-05 VITALS
HEIGHT: 63 IN | DIASTOLIC BLOOD PRESSURE: 70 MMHG | OXYGEN SATURATION: 97 % | WEIGHT: 235 LBS | TEMPERATURE: 97.7 F | HEART RATE: 54 BPM | BODY MASS INDEX: 41.64 KG/M2 | SYSTOLIC BLOOD PRESSURE: 122 MMHG

## 2018-07-05 DIAGNOSIS — I10 HTN (HYPERTENSION), BENIGN: ICD-10-CM

## 2018-07-05 DIAGNOSIS — F32.A DEPRESSION, UNSPECIFIED DEPRESSION TYPE: Primary | ICD-10-CM

## 2018-07-05 DIAGNOSIS — R60.9 DEPENDENT EDEMA: ICD-10-CM

## 2018-07-05 PROCEDURE — 99214 OFFICE O/P EST MOD 30 MIN: CPT | Performed by: INTERNAL MEDICINE

## 2018-07-05 RX ORDER — LEVOTHYROXINE SODIUM 0.05 MG/1
50 TABLET ORAL DAILY
Qty: 90 TABLET | Refills: 3 | Status: SHIPPED | OUTPATIENT
Start: 2018-07-05 | End: 2019-02-21 | Stop reason: SDUPTHER

## 2018-07-05 RX ORDER — CITALOPRAM 10 MG/1
10 TABLET ORAL DAILY
Qty: 90 TABLET | Refills: 3 | Status: SHIPPED | OUTPATIENT
Start: 2018-07-05 | End: 2019-02-21 | Stop reason: SDUPTHER

## 2018-07-05 RX ORDER — FUROSEMIDE 20 MG/1
20 TABLET ORAL DAILY
Qty: 90 TABLET | Refills: 3 | Status: SHIPPED | OUTPATIENT
Start: 2018-07-05 | End: 2019-02-21 | Stop reason: SDUPTHER

## 2018-07-05 NOTE — PROGRESS NOTES
Subjective   Alice Parisi is a 78 y.o. female. Patient is here today for follow-up on her hypertension, dependent edema and depression.  At the last visit I started her on Lasix and Celexa and she's here to see how she's doing.  Patient reports her mood is much improved and she is happy with that.  She is no longer crying at home and is not feeling as depressed.  Her edema is also better with the Lasix and she's had no particular problems with it.  Chief Complaint   Patient presents with   • Edema     DEPRESSION- FOLLOW UP SINCE STARTING FUROSEMIDE AND CELEXA          Vitals:    07/05/18 0933   BP: 122/70   Pulse: 54   Temp: 97.7 °F (36.5 °C)   SpO2: 97%     The following portions of the patient's history were reviewed and updated as appropriate: allergies, current medications, past family history, past medical history, past social history, past surgical history and problem list.    Past Medical History:   Diagnosis Date   • Arthritis    • Diabetes mellitus (CMS/[facility])    • Hyperlipidemia    • Hypertension    • Incontinence    • Obesity    • Sleep apnea       No Known Allergies   Social History     Social History   • Marital status:      Spouse name: N/A   • Number of children: N/A   • Years of education: N/A     Occupational History   • Not on file.     Social History Main Topics   • Smoking status: Never Smoker   • Smokeless tobacco: Never Used   • Alcohol use No   • Drug use: Unknown   • Sexual activity: Defer     Other Topics Concern   • Not on file     Social History Narrative   • No narrative on file        Current Outpatient Prescriptions:   •  ACCU-CHEK PARRIS PLUS test strip, USE TO TEST TWICE A DAY DX: TYPE 2 DIABETES MELLITUS ICD 10: E11.9, Disp: 200 each, Rfl: 3  •  ACCU-CHEK FASTCLIX LANCETS [facility] [place], USE TO TEST TWICE DAILY DIAGNOSIS: E11.9, Disp: 200 each, Rfl: 3  •  atenolol (TENORMIN) 25 MG tablet, Take 1 tablet by mouth 2 (Two) Times a Day., Disp: 180 tablet, Rfl: 3  •  citalopram (CeleXA) 10 MG  tablet, Take 1 tablet by mouth Daily., Disp: 90 tablet, Rfl: 3  •  furosemide (LASIX) 20 MG tablet, Take 1 tablet by mouth Daily., Disp: 90 tablet, Rfl: 3  •  levothyroxine (SYNTHROID, LEVOTHROID) 50 MCG tablet, Take 1 tablet by mouth Daily., Disp: 90 tablet, Rfl: 3  •  losartan-hydrochlorothiazide (HYZAAR) 50-12.5 MG per tablet, Take 1 tablet by mouth Daily., Disp: 90 tablet, Rfl: 3  •  metFORMIN (GLUCOPHAGE) 500 MG tablet, Take 1 tablet by mouth Daily With Breakfast., Disp: 90 tablet, Rfl: 3  •  pioglitazone (ACTOS) 30 MG tablet, TAKE 1 TABLET DAILY, Disp: 90 tablet, Rfl: 3  •  simvastatin (ZOCOR) 40 MG tablet, Take 1 tablet by mouth Every Night., Disp: 90 tablet, Rfl: 3     Objective     History of Present Illness     Review of Systems   Constitutional: Negative.    HENT: Negative.    Eyes: Negative.    Respiratory: Negative.    Cardiovascular: Negative.    Gastrointestinal: Negative.    Endocrine: Negative.    Genitourinary: Negative.    Musculoskeletal: Negative.    Skin: Negative.    Neurological: Negative.    Psychiatric/Behavioral: Negative.        Physical Exam   Constitutional: She appears well-developed and well-nourished.   Pleasant, cooperative and not appearing depressed with a much improved affect.  Blood pressure 120 over about 80   HENT:   Head: Normocephalic and atraumatic.   Eyes: Conjunctivae are normal. Pupils are equal, round, and reactive to light. No scleral icterus.   Cardiovascular: Normal rate, regular rhythm and normal heart sounds.    Pulmonary/Chest: Effort normal and breath sounds normal. No respiratory distress. She has no wheezes. She has no rales.   Musculoskeletal: She exhibits no edema.   Nursing note and vitals reviewed.      ASSESSMENT  patient's blood pressure is under adequate control.  She is having an improved mood and controlled depression on the Celexa and is had no side effects.  Her edema is not present currently and is adequately controlled on the Lasix.     Problem  List Items Addressed This Visit        Cardiovascular and Mediastinum    HTN (hypertension), benign    Relevant Medications    furosemide (LASIX) 20 MG tablet       Other    Depression - Primary    Relevant Medications    citalopram (CeleXA) 10 MG tablet    Dependent edema          PLAN  The patient will continue the Celexa and Lasix.  She'll continue her other medicines.  I plan on rechecking her in 4 months with a CBC, CMP, lipid panel, hemoglobin A1c and TSH and free T4    There are no Patient Instructions on file for this visit.  Return in about 4 months (around 11/5/2018) for with labs.

## 2018-10-09 DIAGNOSIS — I10 HTN (HYPERTENSION), BENIGN: ICD-10-CM

## 2018-10-09 DIAGNOSIS — E11.9 TYPE 2 DIABETES MELLITUS WITHOUT COMPLICATION, WITHOUT LONG-TERM CURRENT USE OF INSULIN (HCC): ICD-10-CM

## 2018-10-09 DIAGNOSIS — E78.5 HYPERLIPIDEMIA, UNSPECIFIED HYPERLIPIDEMIA TYPE: ICD-10-CM

## 2018-10-09 DIAGNOSIS — E03.9 ACQUIRED HYPOTHYROIDISM: ICD-10-CM

## 2018-10-11 LAB
ALBUMIN SERPL-MCNC: 4.2 G/DL (ref 3.5–5.2)
ALBUMIN/GLOB SERPL: 1.6 G/DL
ALP SERPL-CCNC: 63 U/L (ref 39–117)
ALT SERPL-CCNC: 19 U/L (ref 1–33)
AST SERPL-CCNC: 18 U/L (ref 1–32)
BASOPHILS # BLD AUTO: 0.04 10*3/MM3 (ref 0–0.2)
BASOPHILS NFR BLD AUTO: 0.5 % (ref 0–1.5)
BILIRUB SERPL-MCNC: 0.4 MG/DL (ref 0.1–1.2)
BUN SERPL-MCNC: 24 MG/DL (ref 8–23)
BUN/CREAT SERPL: 42.1 (ref 7–25)
CALCIUM SERPL-MCNC: 9.9 MG/DL (ref 8.6–10.5)
CHLORIDE SERPL-SCNC: 102 MMOL/L (ref 98–107)
CHOLEST SERPL-MCNC: 150 MG/DL (ref 0–200)
CO2 SERPL-SCNC: 30.3 MMOL/L (ref 22–29)
CREAT SERPL-MCNC: 0.57 MG/DL (ref 0.57–1)
EOSINOPHIL # BLD AUTO: 0.36 10*3/MM3 (ref 0–0.7)
EOSINOPHIL NFR BLD AUTO: 4.5 % (ref 0.3–6.2)
ERYTHROCYTE [DISTWIDTH] IN BLOOD BY AUTOMATED COUNT: 15.3 % (ref 11.7–13)
GLOBULIN SER CALC-MCNC: 2.6 GM/DL
GLUCOSE SERPL-MCNC: 95 MG/DL (ref 65–99)
HBA1C MFR BLD: 5.58 % (ref 4.8–5.6)
HCT VFR BLD AUTO: 40.1 % (ref 35.6–45.5)
HDLC SERPL-MCNC: 70 MG/DL (ref 40–60)
HGB BLD-MCNC: 12.7 G/DL (ref 11.9–15.5)
IMM GRANULOCYTES # BLD: 0.02 10*3/MM3 (ref 0–0.03)
IMM GRANULOCYTES NFR BLD: 0.3 % (ref 0–0.5)
LDLC SERPL CALC-MCNC: 64 MG/DL (ref 0–100)
LDLC/HDLC SERPL: 0.92 {RATIO}
LYMPHOCYTES # BLD AUTO: 2.36 10*3/MM3 (ref 0.9–4.8)
LYMPHOCYTES NFR BLD AUTO: 29.6 % (ref 19.6–45.3)
MCH RBC QN AUTO: 31.1 PG (ref 26.9–32)
MCHC RBC AUTO-ENTMCNC: 31.7 G/DL (ref 32.4–36.3)
MCV RBC AUTO: 98.3 FL (ref 80.5–98.2)
MONOCYTES # BLD AUTO: 0.78 10*3/MM3 (ref 0.2–1.2)
MONOCYTES NFR BLD AUTO: 9.8 % (ref 5–12)
NEUTROPHILS # BLD AUTO: 4.43 10*3/MM3 (ref 1.9–8.1)
NEUTROPHILS NFR BLD AUTO: 55.6 % (ref 42.7–76)
PLATELET # BLD AUTO: 167 10*3/MM3 (ref 140–500)
POTASSIUM SERPL-SCNC: 4 MMOL/L (ref 3.5–5.2)
PROT SERPL-MCNC: 6.8 G/DL (ref 6–8.5)
RBC # BLD AUTO: 4.08 10*6/MM3 (ref 3.9–5.2)
SODIUM SERPL-SCNC: 142 MMOL/L (ref 136–145)
TRIGL SERPL-MCNC: 78 MG/DL (ref 0–150)
TSH SERPL DL<=0.005 MIU/L-ACNC: 2.4 MIU/ML (ref 0.27–4.2)
VLDLC SERPL CALC-MCNC: 15.6 MG/DL (ref 5–40)
WBC # BLD AUTO: 7.97 10*3/MM3 (ref 4.5–10.7)

## 2018-10-17 ENCOUNTER — OFFICE VISIT (OUTPATIENT)
Dept: FAMILY MEDICINE CLINIC | Facility: CLINIC | Age: 78
End: 2018-10-17

## 2018-10-17 VITALS
TEMPERATURE: 97.6 F | OXYGEN SATURATION: 98 % | SYSTOLIC BLOOD PRESSURE: 130 MMHG | HEIGHT: 63 IN | DIASTOLIC BLOOD PRESSURE: 74 MMHG | BODY MASS INDEX: 41.67 KG/M2 | WEIGHT: 235.2 LBS | HEART RATE: 55 BPM

## 2018-10-17 DIAGNOSIS — E66.01 CLASS 3 SEVERE OBESITY DUE TO EXCESS CALORIES WITH SERIOUS COMORBIDITY AND BODY MASS INDEX (BMI) OF 40.0 TO 44.9 IN ADULT (HCC): ICD-10-CM

## 2018-10-17 DIAGNOSIS — G14 POST-POLIO SYNDROME: ICD-10-CM

## 2018-10-17 DIAGNOSIS — F32.A DEPRESSION, UNSPECIFIED DEPRESSION TYPE: ICD-10-CM

## 2018-10-17 DIAGNOSIS — R60.9 DEPENDENT EDEMA: ICD-10-CM

## 2018-10-17 DIAGNOSIS — I10 HTN (HYPERTENSION), BENIGN: ICD-10-CM

## 2018-10-17 DIAGNOSIS — E03.9 ACQUIRED HYPOTHYROIDISM: ICD-10-CM

## 2018-10-17 DIAGNOSIS — E11.9 TYPE 2 DIABETES MELLITUS WITHOUT COMPLICATION, WITHOUT LONG-TERM CURRENT USE OF INSULIN (HCC): ICD-10-CM

## 2018-10-17 DIAGNOSIS — E78.5 HYPERLIPIDEMIA, UNSPECIFIED HYPERLIPIDEMIA TYPE: Primary | ICD-10-CM

## 2018-10-17 PROCEDURE — 99214 OFFICE O/P EST MOD 30 MIN: CPT | Performed by: INTERNAL MEDICINE

## 2018-10-17 NOTE — PROGRESS NOTES
Elisa Parisi is a 78 y.o. female. Patient is here today for follow-up on her hypertension, hyperlipidemia, obesity, diabetes mellitus type 2, hypothyroidism and post polio syndrome.  She also has some generalized arthritic complaints.  She has been overall doing well and home sugars have been good and she's had no chest pain, shortness of breath, change in edema or myalgias.  She already is had a flu shot.  Chief Complaint   Patient presents with   • Hypertension     lab follow up           Vitals:    10/17/18 0835   BP: 130/74   Pulse: 55   Temp: 97.6 °F (36.4 °C)   SpO2: 98%     The following portions of the patient's history were reviewed and updated as appropriate: allergies, current medications, past family history, past medical history, past social history, past surgical history and problem list.    Past Medical History:   Diagnosis Date   • Arthritis    • Diabetes mellitus (CMS/HCC)    • Hyperlipidemia    • Hypertension    • Incontinence    • Obesity    • Sleep apnea       No Known Allergies   Social History     Social History   • Marital status:      Spouse name: N/A   • Number of children: N/A   • Years of education: N/A     Occupational History   • Not on file.     Social History Main Topics   • Smoking status: Never Smoker   • Smokeless tobacco: Never Used   • Alcohol use No   • Drug use: Unknown   • Sexual activity: Defer     Other Topics Concern   • Not on file     Social History Narrative   • No narrative on file        Current Outpatient Prescriptions:   •  ACCU-CHEK PARRIS PLUS test strip, USE TO TEST TWICE A DAY DX: TYPE 2 DIABETES MELLITUS ICD 10: E11.9, Disp: 200 each, Rfl: 3  •  [place] [facility] [place] TO [place] TWICE DAILY DIAGNOSIS: E11.9, Disp: 200 each, Rfl: 3  •  atenolol (TENORMIN) 25 MG tablet, Take 1 tablet by mouth 2 (Two) Times a Day., Disp: 180 tablet, Rfl: 3  •  citalopram (CeleXA) 10 MG tablet, Take 1 tablet by mouth Daily., Disp: 90 tablet, Rfl: 3  •   furosemide (LASIX) 20 MG tablet, Take 1 tablet by mouth Daily., Disp: 90 tablet, Rfl: 3  •  levothyroxine (SYNTHROID, LEVOTHROID) 50 MCG tablet, Take 1 tablet by mouth Daily., Disp: 90 tablet, Rfl: 3  •  losartan-hydrochlorothiazide (HYZAAR) 50-12.5 MG per tablet, Take 1 tablet by mouth Daily., Disp: 90 tablet, Rfl: 3  •  metFORMIN (GLUCOPHAGE) 500 MG tablet, Take 1 tablet by mouth Daily With Breakfast., Disp: 90 tablet, Rfl: 3  •  simvastatin (ZOCOR) 40 MG tablet, Take 1 tablet by mouth Every Night., Disp: 90 tablet, Rfl: 3     Objective     History of Present Illness     Review of Systems   Constitutional: Negative.    HENT: Negative.    Eyes: Negative.    Respiratory: Negative.    Cardiovascular: Negative.    Gastrointestinal: Negative.    Genitourinary: Negative.    Musculoskeletal: Negative.    Skin: Negative.    Neurological: Negative.    Psychiatric/Behavioral: Negative.        Physical Exam   Constitutional: She is oriented to person, place, and time. She appears well-developed and well-nourished.   Pleasant, cooperative no distress blood pressure 130/80   HENT:   Head: Normocephalic and atraumatic.   Eyes: Pupils are equal, round, and reactive to light. Conjunctivae are normal. No scleral icterus.   Neck: Normal range of motion. Neck supple. No thyromegaly present.   Cardiovascular: Normal rate, regular rhythm and normal heart sounds.    Pulmonary/Chest: Effort normal and breath sounds normal. No respiratory distress. She has no wheezes. She has no rales.   Musculoskeletal: Normal range of motion. She exhibits no edema.   Neurological: She is alert and oriented to person, place, and time.   Skin: Skin is warm and dry.   Psychiatric: She has a normal mood and affect. Her behavior is normal.   Nursing note and vitals reviewed.      ASSESSMENT  CBC was normal.  CMP had a normal Sugar of 95 and was otherwise fine.  Hemoglobin A1c is also normal at 5.58.  TSH was quite normal and lipid panel is stable and  acceptable with total cholesterol 150, HDL of 70 and LDL 64.  #1-hypertension, well controlled on medication  #2-hyperlipidemia, excellent control on medication  #3-obesity, stable  #4-diabetes mellitus type 2, excellent control on medications  #5-hypothyroidism, euthyroid on replacement     Problem List Items Addressed This Visit        Cardiovascular and Mediastinum    HTN (hypertension), benign    Hyperlipidemia - Primary       Digestive    Obesity       Endocrine    DM2 (diabetes mellitus, type 2) (CMS/HCC)    Hypothyroid       Nervous and Auditory    Post-polio syndrome       Other    Depression    Dependent edema          PLAN  I'm going to discontinue the pioglitazone 30 mg and see how the patient does off of that.  She will continue her other medications as now.  I recommended the shingles vaccination.  I plan on rechecking the patient in 4 months with a CMP, lipid panel, hemoglobin A1c and TSH and free T4    There are no Patient Instructions on file for this visit.  Return in about 4 months (around 2/17/2019) for with labs.

## 2018-10-26 ENCOUNTER — TELEPHONE (OUTPATIENT)
Dept: FAMILY MEDICINE CLINIC | Facility: CLINIC | Age: 78
End: 2018-10-26

## 2018-10-26 NOTE — TELEPHONE ENCOUNTER
Called pt and let her know       ----- Message from Geronimo Fischer MD sent at 10/25/2018  8:11 AM EDT -----  That's a very high dose.  I would try ibuprofen 400 mg and take it with food up to 3 times a day and see how that does        ----- Message -----  From: Romel Collins MA  Sent: 10/24/2018   1:41 PM  To: Geronimo Fischer MD    Please advise  ----- Message -----  From: Tia Mc RegSched Rep  Sent: 10/19/2018   1:46 PM  To: Romel Collins MA    PT CALLED IN STATING SHE FORGOT TO ASK  IF HE WOULD SEND IN A SCRIPT FOR IBUPROFEN 800 MG FOR HER ARTHRITIS AND POST POLIO [facility]  IN [facility] AS LONG AS IT DOES NOT INTERFERE WITH HER OTHER MEDICATIONS    PLEASE CONTACT PT AND ADVISE ON WHAT DR DECIDED TO DO [phone]

## 2018-12-12 RX ORDER — ATENOLOL 25 MG/1
TABLET ORAL
Qty: 180 TABLET | Refills: 3 | Status: SHIPPED | OUTPATIENT
Start: 2018-12-12 | End: 2020-01-13 | Stop reason: SDUPTHER

## 2019-02-12 DIAGNOSIS — E11.9 TYPE 2 DIABETES MELLITUS WITHOUT COMPLICATION, WITHOUT LONG-TERM CURRENT USE OF INSULIN (HCC): ICD-10-CM

## 2019-02-12 DIAGNOSIS — I10 HTN (HYPERTENSION), BENIGN: Primary | ICD-10-CM

## 2019-02-12 DIAGNOSIS — E03.9 ACQUIRED HYPOTHYROIDISM: ICD-10-CM

## 2019-02-12 DIAGNOSIS — E78.5 HYPERLIPIDEMIA, UNSPECIFIED HYPERLIPIDEMIA TYPE: ICD-10-CM

## 2019-02-15 LAB
ALBUMIN SERPL-MCNC: 3.9 G/DL (ref 3.5–4.8)
ALBUMIN/GLOB SERPL: 1.4 {RATIO} (ref 1.2–2.2)
ALP SERPL-CCNC: 70 IU/L (ref 39–117)
ALT SERPL-CCNC: 21 IU/L (ref 0–32)
AST SERPL-CCNC: 23 IU/L (ref 0–40)
BILIRUB SERPL-MCNC: 0.3 MG/DL (ref 0–1.2)
BUN SERPL-MCNC: 18 MG/DL (ref 8–27)
BUN/CREAT SERPL: 34 (ref 12–28)
CALCIUM SERPL-MCNC: 9.8 MG/DL (ref 8.7–10.3)
CHLORIDE SERPL-SCNC: 103 MMOL/L (ref 96–106)
CHOLEST SERPL-MCNC: 158 MG/DL (ref 100–199)
CO2 SERPL-SCNC: 24 MMOL/L (ref 20–29)
CREAT SERPL-MCNC: 0.53 MG/DL (ref 0.57–1)
GLOBULIN SER CALC-MCNC: 2.7 G/DL (ref 1.5–4.5)
GLUCOSE SERPL-MCNC: 113 MG/DL (ref 65–99)
HBA1C MFR BLD: 6.3 % (ref 4.8–5.6)
HDLC SERPL-MCNC: 69 MG/DL
LDLC SERPL CALC-MCNC: 66 MG/DL (ref 0–99)
LDLC/HDLC SERPL: 1 RATIO (ref 0–3.2)
POTASSIUM SERPL-SCNC: 4.2 MMOL/L (ref 3.5–5.2)
PROT SERPL-MCNC: 6.6 G/DL (ref 6–8.5)
SODIUM SERPL-SCNC: 143 MMOL/L (ref 134–144)
T4 FREE SERPL-MCNC: 1.18 NG/DL (ref 0.82–1.77)
TRIGL SERPL-MCNC: 115 MG/DL (ref 0–149)
TSH SERPL DL<=0.005 MIU/L-ACNC: 2.17 UIU/ML (ref 0.45–4.5)
VLDLC SERPL CALC-MCNC: 23 MG/DL (ref 5–40)

## 2019-02-21 ENCOUNTER — OFFICE VISIT (OUTPATIENT)
Dept: FAMILY MEDICINE CLINIC | Facility: CLINIC | Age: 79
End: 2019-02-21

## 2019-02-21 VITALS
TEMPERATURE: 98 F | HEIGHT: 62 IN | WEIGHT: 233 LBS | RESPIRATION RATE: 17 BRPM | HEART RATE: 92 BPM | BODY MASS INDEX: 42.88 KG/M2 | OXYGEN SATURATION: 97 % | SYSTOLIC BLOOD PRESSURE: 142 MMHG | DIASTOLIC BLOOD PRESSURE: 70 MMHG

## 2019-02-21 DIAGNOSIS — E11.9 TYPE 2 DIABETES MELLITUS WITHOUT COMPLICATION, WITHOUT LONG-TERM CURRENT USE OF INSULIN (HCC): ICD-10-CM

## 2019-02-21 DIAGNOSIS — Z74.09 IMPAIRED MOBILITY: ICD-10-CM

## 2019-02-21 DIAGNOSIS — E03.9 ACQUIRED HYPOTHYROIDISM: ICD-10-CM

## 2019-02-21 DIAGNOSIS — I10 HTN (HYPERTENSION), BENIGN: ICD-10-CM

## 2019-02-21 DIAGNOSIS — E78.5 HYPERLIPIDEMIA, UNSPECIFIED HYPERLIPIDEMIA TYPE: Primary | ICD-10-CM

## 2019-02-21 DIAGNOSIS — G14 POST-POLIO SYNDROME: ICD-10-CM

## 2019-02-21 DIAGNOSIS — E66.01 CLASS 3 SEVERE OBESITY DUE TO EXCESS CALORIES WITH SERIOUS COMORBIDITY AND BODY MASS INDEX (BMI) OF 40.0 TO 44.9 IN ADULT (HCC): ICD-10-CM

## 2019-02-21 PROCEDURE — 99214 OFFICE O/P EST MOD 30 MIN: CPT | Performed by: INTERNAL MEDICINE

## 2019-02-21 RX ORDER — LEVOTHYROXINE SODIUM 0.05 MG/1
50 TABLET ORAL DAILY
Qty: 90 TABLET | Refills: 3 | Status: SHIPPED | OUTPATIENT
Start: 2019-02-21 | End: 2020-01-13 | Stop reason: SDUPTHER

## 2019-02-21 RX ORDER — FUROSEMIDE 20 MG/1
20 TABLET ORAL DAILY
Qty: 90 TABLET | Refills: 3 | Status: SHIPPED | OUTPATIENT
Start: 2019-02-21

## 2019-02-21 RX ORDER — LOSARTAN POTASSIUM AND HYDROCHLOROTHIAZIDE 12.5; 5 MG/1; MG/1
1 TABLET ORAL DAILY
Qty: 90 TABLET | Refills: 3 | Status: SHIPPED | OUTPATIENT
Start: 2019-02-21 | End: 2019-11-26 | Stop reason: ALTCHOICE

## 2019-02-21 RX ORDER — SIMVASTATIN 40 MG
40 TABLET ORAL NIGHTLY
Qty: 90 TABLET | Refills: 3 | Status: SHIPPED | OUTPATIENT
Start: 2019-02-21 | End: 2020-01-13 | Stop reason: SDUPTHER

## 2019-02-21 RX ORDER — CITALOPRAM 10 MG/1
10 TABLET ORAL DAILY
Qty: 90 TABLET | Refills: 3 | Status: SHIPPED | OUTPATIENT
Start: 2019-02-21 | End: 2020-01-13 | Stop reason: SDUPTHER

## 2019-02-21 NOTE — PROGRESS NOTES
Elisa Parisi is a 79 y.o. female. Patient is here today for follow-up on her hypertension, hyperlipidemia, obesity, diabetes mellitus type 2, hypothyroidism.  She is generally been stable and home sugars have been certainly reasonable since we stopped the p.o. glitazone at the last visit.  She has had no chest pain, shortness of breath, edema or myalgias.  She did have one shingles shot  Chief Complaint   Patient presents with   • Hyperlipidemia   • Hypertension   • Hypothyroidism   • Diabetes          Vitals:    02/21/19 0836   BP: 142/70   Pulse: 92   Resp: 17   Temp: 98 °F (36.7 °C)   SpO2: 97%     The following portions of the patient's history were reviewed and updated as appropriate: allergies, current medications, past family history, past medical history, past social history, past surgical history and problem list.    Past Medical History:   Diagnosis Date   • Arthritis    • Diabetes mellitus (CMS/[facility])    • Hyperlipidemia    • Hypertension    • Incontinence    • Obesity    • Sleep apnea       No Known Allergies   Social History     Socioeconomic History   • Marital status:      Spouse name: Not on file   • Number of children: Not on file   • Years of education: Not on file   • Highest education level: Not on file   Social Needs   • Financial resource strain: Not on file   • Food insecurity - worry: Not on file   • Food insecurity - inability: Not on file   • Transportation needs - medical: Not on file   • Transportation needs - non-medical: Not on file   Occupational History   • Not on file   Tobacco Use   • Smoking status: Never Smoker   • Smokeless tobacco: Never Used   Substance and Sexual Activity   • Alcohol use: No   • Drug use: Defer   • Sexual activity: Defer   Other Topics Concern   • Not on file   Social History Narrative   • Not on file        Current Outpatient Medications:   •  ACCU-CHEK PARRIS PLUS test strip, USE TO TEST TWICE A DAY DX: TYPE 2 DIABETES MELLITUS ICD 10:  E11.9, Disp: 200 each, Rfl: 3  •  ACCU-CHEK FASTCLIX LANCETS [facility], USE TO TEST TWICE DAILY DIAGNOSIS: E11.9, Disp: 200 each, Rfl: 3  •  atenolol (TENORMIN) 25 MG tablet, TAKE 1 TABLET TWICE A DAY, Disp: 180 tablet, Rfl: 3  •  citalopram (CeleXA) 10 MG tablet, Take 1 tablet by mouth Daily., Disp: 90 tablet, Rfl: 3  •  furosemide (LASIX) 20 MG tablet, Take 1 tablet by mouth Daily., Disp: 90 tablet, Rfl: 3  •  levothyroxine (SYNTHROID, LEVOTHROID) 50 MCG tablet, Take 1 tablet by mouth Daily., Disp: 90 tablet, Rfl: 3  •  losartan-hydrochlorothiazide (HYZAAR) 50-12.5 MG per tablet, Take 1 tablet by mouth Daily., Disp: 90 tablet, Rfl: 3  •  metFORMIN (GLUCOPHAGE) 500 MG tablet, Take 1 tablet by mouth Daily With Breakfast., Disp: 90 tablet, Rfl: 3  •  simvastatin (ZOCOR) 40 MG tablet, Take 1 tablet by mouth Every Night., Disp: 90 tablet, Rfl: 3     Objective     History of Present Illness     Review of Systems   Constitutional: Negative.    HENT: Negative.    Eyes: Negative.    Respiratory: Negative.    Cardiovascular: Negative.    Gastrointestinal: Negative.    Genitourinary: Negative.    Musculoskeletal: Negative.    Skin: Negative.    Neurological: Negative.    Psychiatric/Behavioral: Negative.        Physical Exam   Constitutional: She is oriented to person, place, and time. She appears well-developed and well-nourished.   Pleasant, cooperative no distress, blood pressure 120/70   HENT:   Head: Normocephalic and atraumatic.   Eyes: Conjunctivae are normal. Pupils are equal, round, and reactive to light. No scleral icterus.   Neck: Normal range of motion. Neck supple.   Cardiovascular: Normal rate, regular rhythm and normal heart sounds.   Pulmonary/Chest: Effort normal and breath sounds normal. No respiratory distress. She has no wheezes. She has no rales.   Musculoskeletal: Normal range of motion.   Neurological: She is alert and oriented to person, place, and time.   Skin: Skin is warm and dry.   Psychiatric: She has  a normal mood and affect. Her behavior is normal.   Nursing note and vitals reviewed.      ASSESSMENT CMP has a sugar of 113 and is otherwise essentially normal and hemoglobin A1c was acceptable at 6.3.  Lipid panel is excellent with total cholesterol 158, HDL 69, LDL 66 and TSH and free T4 both quite normal.  #1-hypertension, well controlled on medication  #2-hyperlipidemia, excellent control on medication  #3-diabetes mellitus type 2, adequate control on medication  #4-obesity with weight stable  #5-hypothyroidism, euthyroid on replacement     Problem List Items Addressed This Visit     None          PLAN the patient will continue current medications as now.  I recommended a Tdap immunization for the patient.  I will recheck her in 4 months with a CBC, CMP, lipid panel, hemoglobin A1c, TSH and free T4    There are no Patient Instructions on file for this visit.  Return in about 4 months (around 6/21/2019) for with labs.

## 2019-04-25 RX ORDER — LANCETS
EACH MISCELLANEOUS
Qty: 204 EACH | Refills: 3 | Status: SHIPPED | OUTPATIENT
Start: 2019-04-25 | End: 2020-12-29 | Stop reason: SDUPTHER

## 2019-05-13 DIAGNOSIS — E11.9 TYPE 2 DIABETES MELLITUS WITHOUT COMPLICATION, WITHOUT LONG-TERM CURRENT USE OF INSULIN (HCC): ICD-10-CM

## 2019-05-13 RX ORDER — BLOOD SUGAR DIAGNOSTIC
STRIP MISCELLANEOUS
Qty: 300 EACH | Refills: 3 | Status: SHIPPED | OUTPATIENT
Start: 2019-05-13 | End: 2020-03-02 | Stop reason: SDUPTHER

## 2019-06-20 DIAGNOSIS — E03.9 ACQUIRED HYPOTHYROIDISM: ICD-10-CM

## 2019-06-20 DIAGNOSIS — E78.5 HYPERLIPIDEMIA, UNSPECIFIED HYPERLIPIDEMIA TYPE: ICD-10-CM

## 2019-06-20 DIAGNOSIS — E11.9 TYPE 2 DIABETES MELLITUS WITHOUT COMPLICATION, WITHOUT LONG-TERM CURRENT USE OF INSULIN (HCC): ICD-10-CM

## 2019-06-20 DIAGNOSIS — I10 HTN (HYPERTENSION), BENIGN: Primary | ICD-10-CM

## 2019-06-25 LAB
ALBUMIN SERPL-MCNC: 4.4 G/DL (ref 3.5–5.2)
ALBUMIN/GLOB SERPL: 1.6 G/DL
ALP SERPL-CCNC: 77 U/L (ref 39–117)
ALT SERPL-CCNC: 22 U/L (ref 1–33)
AST SERPL-CCNC: 19 U/L (ref 1–32)
BASOPHILS # BLD AUTO: (no result) 10*3/UL
BASOPHILS # BLD MANUAL: 0.09 10*3/MM3 (ref 0–0.2)
BASOPHILS NFR BLD MANUAL: 1 % (ref 0–1.5)
BILIRUB SERPL-MCNC: 0.4 MG/DL (ref 0.2–1.2)
BUN SERPL-MCNC: 20 MG/DL (ref 8–23)
BUN/CREAT SERPL: 35.1 (ref 7–25)
CALCIUM SERPL-MCNC: 10.2 MG/DL (ref 8.6–10.5)
CHLORIDE SERPL-SCNC: 100 MMOL/L (ref 98–107)
CHOLEST SERPL-MCNC: 174 MG/DL (ref 0–200)
CO2 SERPL-SCNC: 30.6 MMOL/L (ref 22–29)
CREAT SERPL-MCNC: 0.57 MG/DL (ref 0.57–1)
DIFFERENTIAL COMMENT: ABNORMAL
EOSINOPHIL # BLD AUTO: (no result) 10*3/UL
EOSINOPHIL # BLD MANUAL: 0.57 10*3/MM3 (ref 0–0.4)
EOSINOPHIL NFR BLD AUTO: (no result) %
EOSINOPHIL NFR BLD MANUAL: 6.1 % (ref 0.3–6.2)
ERYTHROCYTE [DISTWIDTH] IN BLOOD BY AUTOMATED COUNT: 13.4 % (ref 12.3–15.4)
GLOBULIN SER CALC-MCNC: 2.8 GM/DL
GLUCOSE SERPL-MCNC: 116 MG/DL (ref 65–99)
HBA1C MFR BLD: 6.5 % (ref 4.8–5.6)
HCT VFR BLD AUTO: 45.9 % (ref 34–46.6)
HDLC SERPL-MCNC: 76 MG/DL (ref 40–60)
HGB BLD-MCNC: 14.6 G/DL (ref 12–15.9)
LDLC SERPL CALC-MCNC: 77 MG/DL (ref 0–100)
LDLC/HDLC SERPL: 1.02 {RATIO}
LYMPHOCYTES # BLD AUTO: (no result) 10*3/UL
LYMPHOCYTES # BLD MANUAL: 2.18 10*3/MM3 (ref 0.7–3.1)
LYMPHOCYTES NFR BLD AUTO: (no result) %
LYMPHOCYTES NFR BLD MANUAL: 23.2 % (ref 19.6–45.3)
MCH RBC QN AUTO: 31.6 PG (ref 26.6–33)
MCHC RBC AUTO-ENTMCNC: 31.8 G/DL (ref 31.5–35.7)
MCV RBC AUTO: 99.4 FL (ref 79–97)
MONOCYTES # BLD MANUAL: 0.57 10*3/MM3 (ref 0.1–0.9)
MONOCYTES NFR BLD AUTO: (no result) %
MONOCYTES NFR BLD MANUAL: 6.1 % (ref 5–12)
NEUTROPHILS # BLD MANUAL: 5.97 10*3/MM3 (ref 1.7–7)
NEUTROPHILS NFR BLD AUTO: (no result) %
NEUTROPHILS NFR BLD MANUAL: 63.6 % (ref 42.7–76)
PLATELET # BLD AUTO: 180 10*3/MM3 (ref 140–450)
PLATELET BLD QL SMEAR: ABNORMAL
POTASSIUM SERPL-SCNC: 4.2 MMOL/L (ref 3.5–5.2)
PROT SERPL-MCNC: 7.2 G/DL (ref 6–8.5)
RBC # BLD AUTO: 4.62 10*6/MM3 (ref 3.77–5.28)
RBC MORPH BLD: ABNORMAL
SODIUM SERPL-SCNC: 142 MMOL/L (ref 136–145)
T4 FREE SERPL-MCNC: 1.25 NG/DL (ref 0.93–1.7)
TRIGL SERPL-MCNC: 104 MG/DL (ref 0–150)
TSH SERPL DL<=0.005 MIU/L-ACNC: 1.98 MIU/ML (ref 0.27–4.2)
VLDLC SERPL CALC-MCNC: 20.8 MG/DL
WBC # BLD AUTO: 9.39 10*3/MM3 (ref 3.4–10.8)

## 2019-07-02 ENCOUNTER — OFFICE VISIT (OUTPATIENT)
Dept: FAMILY MEDICINE CLINIC | Facility: CLINIC | Age: 79
End: 2019-07-02

## 2019-07-02 VITALS
WEIGHT: 239 LBS | HEIGHT: 62 IN | DIASTOLIC BLOOD PRESSURE: 70 MMHG | BODY MASS INDEX: 43.98 KG/M2 | SYSTOLIC BLOOD PRESSURE: 150 MMHG | HEART RATE: 64 BPM | TEMPERATURE: 98 F | OXYGEN SATURATION: 97 % | RESPIRATION RATE: 16 BRPM

## 2019-07-02 DIAGNOSIS — E11.9 TYPE 2 DIABETES MELLITUS WITHOUT COMPLICATION, WITHOUT LONG-TERM CURRENT USE OF INSULIN (HCC): ICD-10-CM

## 2019-07-02 DIAGNOSIS — E66.01 CLASS 3 SEVERE OBESITY DUE TO EXCESS CALORIES WITH SERIOUS COMORBIDITY AND BODY MASS INDEX (BMI) OF 40.0 TO 44.9 IN ADULT (HCC): ICD-10-CM

## 2019-07-02 DIAGNOSIS — E78.5 HYPERLIPIDEMIA, UNSPECIFIED HYPERLIPIDEMIA TYPE: Primary | ICD-10-CM

## 2019-07-02 DIAGNOSIS — R60.9 DEPENDENT EDEMA: ICD-10-CM

## 2019-07-02 DIAGNOSIS — I10 HTN (HYPERTENSION), BENIGN: ICD-10-CM

## 2019-07-02 DIAGNOSIS — E03.9 ACQUIRED HYPOTHYROIDISM: ICD-10-CM

## 2019-07-02 PROCEDURE — 99214 OFFICE O/P EST MOD 30 MIN: CPT | Performed by: INTERNAL MEDICINE

## 2019-07-02 NOTE — PROGRESS NOTES
Elisa Parisi is a 79 y.o. female. Patient is here today for follow-up on her hypertension, hyperlipidemia, obesity, diabetes mellitus type 2, hypothyroidism and dependent edema.  The patient's generally been stable.  Unfortunately she has gained about 6 pounds.  She has no new acute complaints.  Chief Complaint   Patient presents with   • Hyperlipidemia   • Diabetes   • Hypertension   • Hypothyroidism          Vitals:    07/02/19 0936   BP: 150/70   Pulse: 64   Resp: 16   Temp: 98 °F (36.7 °C)   SpO2: 97%     The following portions of the patient's history were reviewed and updated as appropriate: allergies, current medications, past family history, past medical history, past social history, past surgical history and problem list.    Past Medical History:   Diagnosis Date   • Arthritis    • Diabetes mellitus (CMS/[facility])    • Hyperlipidemia    • Hypertension    • Incontinence    • Obesity    • Sleep apnea       No Known Allergies   Social History     Socioeconomic History   • Marital status:      Spouse name: Not on file   • Number of children: Not on file   • Years of education: Not on file   • Highest education level: Not on file   Tobacco Use   • Smoking status: Never Smoker   • Smokeless tobacco: Never Used   Substance and Sexual Activity   • Alcohol use: No   • Drug use: Defer   • Sexual activity: Defer        Current Outpatient Medications:   •  ACCU-CHEK PARRIS PLUS test strip, USE TO TEST TWICE A DAY DX: TYPE 2 DIABETES MELLITUS ICD 10: E11.9, Disp: 300 each, Rfl: 3  •  ACCU-CHEK FASTCLIX LANCETS [facility], USE TO TEST TWICE A DAY, Disp: 204 each, Rfl: 3  •  atenolol (TENORMIN) 25 MG tablet, TAKE 1 TABLET TWICE A DAY, Disp: 180 tablet, Rfl: 3  •  citalopram (CeleXA) 10 MG tablet, Take 1 tablet by mouth Daily., Disp: 90 tablet, Rfl: 3  •  furosemide (LASIX) 20 MG tablet, Take 1 tablet by mouth Daily., Disp: 90 tablet, Rfl: 3  •  levothyroxine (SYNTHROID, LEVOTHROID) 50 MCG tablet, Take 1 tablet by  mouth Daily., Disp: 90 tablet, Rfl: 3  •  losartan-hydrochlorothiazide (HYZAAR) 50-12.5 MG per tablet, Take 1 tablet by mouth Daily., Disp: 90 tablet, Rfl: 3  •  metFORMIN (GLUCOPHAGE) 500 MG tablet, Take 1 tablet by mouth Daily With Breakfast., Disp: 90 tablet, Rfl: 3  •  simvastatin (ZOCOR) 40 MG tablet, Take 1 tablet by mouth Every Night., Disp: 90 tablet, Rfl: 3     Objective     History of Present Illness     Review of Systems   Constitutional: Negative.    HENT: Negative.    Eyes: Negative.    Respiratory: Negative.    Cardiovascular: Negative.    Gastrointestinal: Negative.    Genitourinary: Negative.    Musculoskeletal: Negative.    Skin: Negative.    Neurological: Negative.    Psychiatric/Behavioral: Negative.        Physical Exam   Constitutional: She is oriented to person, place, and time. She appears well-developed and well-nourished.   Pleasant, cooperative no distress but in a wheelchair and obese, blood pressure 130/80   HENT:   Head: Normocephalic and atraumatic.   Eyes: Conjunctivae are normal. Pupils are equal, round, and reactive to light. No scleral icterus.   Neck: Normal range of motion. Neck supple.   Cardiovascular: Normal rate, regular rhythm and normal heart sounds.   Pulmonary/Chest: Effort normal and breath sounds normal. No respiratory distress. She has no wheezes. She has no rales.   Musculoskeletal: Normal range of motion. She exhibits no edema.   Neurological: She is alert and oriented to person, place, and time.   Skin: Skin is warm and dry.   Psychiatric: She has a normal mood and affect. Her behavior is normal.   Nursing note and vitals reviewed.      ASSESSMENT CBC is essentially normal.  CMP has an elevated sugar of 116 that stable and is otherwise normal.  Hemoglobin A1c is a bit higher but acceptable at 6.5.  TSH and free T4 were both quite normal.  Lipid panel is generally well controlled with total cholesterol 174, HDL of 76 and LDL 77  #1-hypertension, controlled on  medication  #2-hyperlipidemia, controlled on medication  #3-diabetes mellitus type 2, adequate control on metformin  #4-hypothyroidism, euthyroid on replacement  #5-obesity with 6 pound weight gain     Problem List Items Addressed This Visit        Cardiovascular and Mediastinum    HTN (hypertension), benign    Hyperlipidemia - Primary       Digestive    Obesity       Endocrine    DM2 (diabetes mellitus, type 2) (CMS/[facility])    Hypothyroid       Other    Dependent edema          PLAN I encouraged the patient to try and watch her diet and lose weight.  She will continue other medications as now.  I will recheck her in 4 months with a CMP, lipid panel, hemoglobin A1c, TSH and free T4    There are no Patient Instructions on file for this visit.  Return in about 4 months (around 11/2/2019) for with labs.

## 2019-09-03 ENCOUNTER — TELEPHONE (OUTPATIENT)
Dept: FAMILY MEDICINE CLINIC | Facility: CLINIC | Age: 79
End: 2019-09-03

## 2019-09-03 NOTE — TELEPHONE ENCOUNTER
I HAVE CALLED PATIENT AND TRIED TO REACH HER REGARDING NEEDING AN APPOINTMENT. EVERY TIME I CALL, THE PHONE IS PICKED UP AND HUNG UP AND I AM NOT ABLE TO REACH THE PATIENT, BUT SHE DOES NEED AN APPOINTMENT FOR THIS.     ----- Message from Romel Collins MA sent at 8/30/2019  1:15 PM EDT -----  Contact: pt   Pt would like an order for a hospital bed for her insurance please call pt at [phone] pt was told she might need an appointment and she stated she would not make an appointment

## 2019-09-23 ENCOUNTER — TELEPHONE (OUTPATIENT)
Dept: FAMILY MEDICINE CLINIC | Facility: CLINIC | Age: 79
End: 2019-09-23

## 2019-09-23 NOTE — TELEPHONE ENCOUNTER
PATIENT HAS TO HAVE AN APPOINTMENT FOR THIS. SHE HAS BEEN TOLD THIS, AND REFUSED APPOINTMENT. I HAVE CALLED PATIENT MANY TIMES AND CANNOT GET A RETURN PHONE CALL.     ----- Message from Breanna Landin sent at 9/13/2019 10:07 AM EDT -----  Contact: [phone]  HER HOSPITAL BED IS BROKEN AND SHE NEEDS A NEW ONE -IT NEEDS TO HAVE  HEAD AND LEG LIFT    SHE IS POST POLIO AND IN A WHEEL CHAIR- HAVING HARD TIME LIFTING LEG TO GET INTO BED SINCE LIFT IS BROKEN     ORDER NEEDS TO GO TO [place]  PHONE [phone]    PLEASE LET HER KNOW WHEN THIS HAS BEEN SENT

## 2019-11-07 DIAGNOSIS — E78.5 HYPERLIPIDEMIA, UNSPECIFIED HYPERLIPIDEMIA TYPE: Primary | ICD-10-CM

## 2019-11-07 DIAGNOSIS — E03.9 ACQUIRED HYPOTHYROIDISM: ICD-10-CM

## 2019-11-07 DIAGNOSIS — E11.9 TYPE 2 DIABETES MELLITUS WITHOUT COMPLICATION, WITHOUT LONG-TERM CURRENT USE OF INSULIN (HCC): ICD-10-CM

## 2019-11-07 DIAGNOSIS — I10 HTN (HYPERTENSION), BENIGN: ICD-10-CM

## 2019-11-08 LAB
ALBUMIN SERPL-MCNC: 4.3 G/DL (ref 3.5–5.2)
ALBUMIN/GLOB SERPL: 1.9 G/DL
ALP SERPL-CCNC: 63 U/L (ref 39–117)
ALT SERPL-CCNC: 19 U/L (ref 1–33)
AST SERPL-CCNC: 17 U/L (ref 1–32)
BILIRUB SERPL-MCNC: 0.3 MG/DL (ref 0.2–1.2)
BUN SERPL-MCNC: 20 MG/DL (ref 8–23)
BUN/CREAT SERPL: 33.3 (ref 7–25)
CALCIUM SERPL-MCNC: 9.7 MG/DL (ref 8.6–10.5)
CHLORIDE SERPL-SCNC: 100 MMOL/L (ref 98–107)
CHOLEST SERPL-MCNC: 160 MG/DL (ref 0–200)
CO2 SERPL-SCNC: 29.8 MMOL/L (ref 22–29)
CREAT SERPL-MCNC: 0.6 MG/DL (ref 0.57–1)
GLOBULIN SER CALC-MCNC: 2.3 GM/DL
GLUCOSE SERPL-MCNC: 114 MG/DL (ref 65–99)
HBA1C MFR BLD: 6.5 % (ref 4.8–5.6)
HDLC SERPL-MCNC: 71 MG/DL (ref 40–60)
LDLC SERPL CALC-MCNC: 69 MG/DL (ref 0–100)
LDLC/HDLC SERPL: 0.97 {RATIO}
POTASSIUM SERPL-SCNC: 4.3 MMOL/L (ref 3.5–5.2)
PROT SERPL-MCNC: 6.6 G/DL (ref 6–8.5)
SODIUM SERPL-SCNC: 141 MMOL/L (ref 136–145)
T4 FREE SERPL-MCNC: 1.19 NG/DL (ref 0.93–1.7)
TRIGL SERPL-MCNC: 99 MG/DL (ref 0–150)
TSH SERPL DL<=0.005 MIU/L-ACNC: 1.94 UIU/ML (ref 0.27–4.2)
VLDLC SERPL CALC-MCNC: 19.8 MG/DL

## 2019-11-14 ENCOUNTER — OFFICE VISIT (OUTPATIENT)
Dept: FAMILY MEDICINE CLINIC | Facility: CLINIC | Age: 79
End: 2019-11-14

## 2019-11-14 VITALS
BODY MASS INDEX: 43.28 KG/M2 | HEIGHT: 62 IN | DIASTOLIC BLOOD PRESSURE: 68 MMHG | SYSTOLIC BLOOD PRESSURE: 120 MMHG | HEART RATE: 55 BPM | OXYGEN SATURATION: 97 % | WEIGHT: 235.2 LBS | TEMPERATURE: 98.4 F | RESPIRATION RATE: 16 BRPM

## 2019-11-14 DIAGNOSIS — I10 HTN (HYPERTENSION), BENIGN: ICD-10-CM

## 2019-11-14 DIAGNOSIS — E11.9 TYPE 2 DIABETES MELLITUS WITHOUT COMPLICATION, WITHOUT LONG-TERM CURRENT USE OF INSULIN (HCC): ICD-10-CM

## 2019-11-14 DIAGNOSIS — Z74.09 IMPAIRED MOBILITY: ICD-10-CM

## 2019-11-14 DIAGNOSIS — E78.5 HYPERLIPIDEMIA, UNSPECIFIED HYPERLIPIDEMIA TYPE: Primary | ICD-10-CM

## 2019-11-14 DIAGNOSIS — G14 POST-POLIO SYNDROME: ICD-10-CM

## 2019-11-14 DIAGNOSIS — E03.9 ACQUIRED HYPOTHYROIDISM: ICD-10-CM

## 2019-11-14 DIAGNOSIS — R60.9 DEPENDENT EDEMA: ICD-10-CM

## 2019-11-14 DIAGNOSIS — M15.9 GENERALIZED OA: ICD-10-CM

## 2019-11-14 PROCEDURE — 99214 OFFICE O/P EST MOD 30 MIN: CPT | Performed by: INTERNAL MEDICINE

## 2019-11-14 NOTE — PROGRESS NOTES
Elisa Parisi is a 79 y.o. female. Patient is here today for follow-up on her hypertension, hyperlipidemia, diabetes mellitus type 2, hypothyroidism, postpolio syndrome, generalized arthritis and impaired mobility and dependent edema.  Patient is in a wheelchair but alert and in no acute distress.  She is generally feeling well and denies any chest pain, shortness of breath, edema or myalgias.  Chief Complaint   Patient presents with   • Diabetes     HLD, HTN- FOLLOW UP LABS          Vitals:    11/14/19 0756   BP: 120/68   Pulse: 55   Resp: 16   Temp: 98.4 °F (36.9 °C)   SpO2: 97%     Body mass index is 43.01 kg/m².  The following portions of the patient's history were reviewed and updated as appropriate: allergies, current medications, past family history, past medical history, past social history, past surgical history and problem list.    Past Medical History:   Diagnosis Date   • Arthritis    • Diabetes mellitus (CMS/HCC)    • Hyperlipidemia    • Hypertension    • Incontinence    • Obesity    • Sleep apnea       No Known Allergies   Social History     Socioeconomic History   • Marital status:      Spouse name: Not on file   • Number of children: Not on file   • Years of education: Not on file   • Highest education level: Not on file   Tobacco Use   • Smoking status: Never Smoker   • Smokeless tobacco: Never Used   Substance and Sexual Activity   • Alcohol use: No   • Drug use: Defer   • Sexual activity: Defer        Current Outpatient Medications:   •  ACCU-CHEK PARRIS PLUS test strip, USE TO TEST TWICE A DAY DX: TYPE 2 DIABETES MELLITUS ICD 10: E11.9, Disp: 300 each, Rfl: 3  •  ACCU-CHEK FASTCLIX LANCETS [facility], USE TO TEST TWICE A DAY, Disp: 204 each, Rfl: 3  •  atenolol (TENORMIN) 25 MG tablet, TAKE 1 TABLET TWICE A DAY, Disp: 180 tablet, Rfl: 3  •  citalopram (CeleXA) 10 MG tablet, Take 1 tablet by mouth Daily., Disp: 90 tablet, Rfl: 3  •  furosemide (LASIX) 20 MG tablet, Take 1 tablet by  mouth Daily., Disp: 90 tablet, Rfl: 3  •  levothyroxine (SYNTHROID, LEVOTHROID) 50 MCG tablet, Take 1 tablet by mouth Daily., Disp: 90 tablet, Rfl: 3  •  losartan-hydrochlorothiazide (HYZAAR) 50-12.5 MG per tablet, Take 1 tablet by mouth Daily., Disp: 90 tablet, Rfl: 3  •  metFORMIN (GLUCOPHAGE) 500 MG tablet, Take 1 tablet by mouth Daily With Breakfast., Disp: 90 tablet, Rfl: 3  •  simvastatin (ZOCOR) 40 MG tablet, Take 1 tablet by mouth Every Night., Disp: 90 tablet, Rfl: 3     Objective     History of Present Illness     Review of Systems   Constitutional: Negative.    HENT: Negative.    Eyes: Negative.    Respiratory: Negative.    Cardiovascular: Negative.    Gastrointestinal: Negative.    Genitourinary: Negative.    Musculoskeletal: Positive for arthralgias, gait problem and myalgias.   Skin: Negative.    Psychiatric/Behavioral: Negative.        Physical Exam   Constitutional: She is oriented to person, place, and time. She appears well-developed and well-nourished.   Pleasant, cooperative in no distress but in a wheelchair, very obese with a blood pressure 125/80   HENT:   Head: Normocephalic and atraumatic.   Eyes: Conjunctivae are normal. Pupils are equal, round, and reactive to light. No scleral icterus.   Neck: Normal range of motion. Neck supple.   Cardiovascular: Normal rate, regular rhythm and normal heart sounds.   Pulmonary/Chest: Effort normal and breath sounds normal. No respiratory distress. She has no wheezes. She has no rales.   Musculoskeletal: Normal range of motion.   Neurological: She is alert and oriented to person, place, and time.   Skin: Skin is warm and dry.   Psychiatric: She has a normal mood and affect. Her behavior is normal.   Nursing note and vitals reviewed.      ASSESSMENT CMP has a sugar of 114 and is otherwise essentially normal and hemoglobin A1c is stable at 6.5.  Lipid panel is under excellent control with total cholesterol 160, HDL 71, LDL 69.  TSH and free T4 were both  normal.  #1-hypertension, well controlled  #2-hyperlipidemia, excellent control  #3-diabetes mellitus type 2, adequate control on medication  #4-hypothyroidism, euthyroid on replacement  #5-postpolio syndrome and generalized arthritis, stable  #6-dependent edema, stable     Problem List Items Addressed This Visit        Cardiovascular and Mediastinum    HTN (hypertension), benign    Hyperlipidemia - Primary       Endocrine    DM2 (diabetes mellitus, type 2) (CMS/[facility])    Hypothyroid       Nervous and Auditory    Post-polio syndrome       Musculoskeletal and Integument    Generalized OA       Other    Impaired mobility    Dependent edema          PLAN I wrote the patient a prescription for hospital bed which is medically necessary because of her post polio syndrome, generalized arthritis edema and impaired mobility.  This is to replace when she is already had.  She will continue current medicines as now and I recommended she get her eyes checked.  I would like to recheck her in 6 months with a CBC, CMP, lipid panel, hemoglobin A1c TSH and free T4 and a urine microalbumin if possible    There are no Patient Instructions on file for this visit.  Return in about 6 months (around 5/14/2020) for with labs.

## 2019-11-26 ENCOUNTER — TELEPHONE (OUTPATIENT)
Dept: FAMILY MEDICINE CLINIC | Facility: CLINIC | Age: 79
End: 2019-11-26

## 2019-11-26 RX ORDER — LOSARTAN POTASSIUM 50 MG/1
50 TABLET ORAL DAILY
Qty: 90 TABLET | Refills: 1 | Status: SHIPPED | OUTPATIENT
Start: 2019-11-26 | End: 2020-06-04 | Stop reason: SDUPTHER

## 2019-11-26 RX ORDER — HYDROCHLOROTHIAZIDE 12.5 MG/1
12.5 TABLET ORAL DAILY
Qty: 90 TABLET | Refills: 1 | Status: SHIPPED | OUTPATIENT
Start: 2019-11-26

## 2019-11-26 NOTE — TELEPHONE ENCOUNTER
RX'S HAVE BEEN SENT TO PHARMACY FOR PATIENT.     ----- Message from Yunier Ferguson sent at 11/25/2019  1:27 PM EST -----  losartan-hydrochlorothiazide (HYZAAR) 50-12.5 MG per tablet [[id number]]         can you write two separate scripts for this and send to pharm.     Pharmacy:  [facility] for [address] [phone] [facility] [phone]   Please call pt back with any questions/concerns at [phone]     thanks yunier

## 2020-01-13 RX ORDER — LEVOTHYROXINE SODIUM 0.05 MG/1
50 TABLET ORAL DAILY
Qty: 90 TABLET | Refills: 3 | Status: SHIPPED | OUTPATIENT
Start: 2020-01-13

## 2020-01-13 RX ORDER — CITALOPRAM 10 MG/1
10 TABLET ORAL DAILY
Qty: 90 TABLET | Refills: 3 | Status: SHIPPED | OUTPATIENT
Start: 2020-01-13

## 2020-01-13 RX ORDER — ATENOLOL 25 MG/1
25 TABLET ORAL 2 TIMES DAILY
Qty: 180 TABLET | Refills: 3 | Status: SHIPPED | OUTPATIENT
Start: 2020-01-13

## 2020-01-13 RX ORDER — SIMVASTATIN 40 MG
40 TABLET ORAL NIGHTLY
Qty: 90 TABLET | Refills: 3 | Status: SHIPPED | OUTPATIENT
Start: 2020-01-13 | End: 2023-01-24 | Stop reason: HOSPADM

## 2020-03-02 ENCOUNTER — TELEPHONE (OUTPATIENT)
Dept: FAMILY MEDICINE CLINIC | Facility: CLINIC | Age: 80
End: 2020-03-02

## 2020-03-02 DIAGNOSIS — E11.9 TYPE 2 DIABETES MELLITUS WITHOUT COMPLICATION, WITHOUT LONG-TERM CURRENT USE OF INSULIN (HCC): ICD-10-CM

## 2020-03-02 RX ORDER — BLOOD SUGAR DIAGNOSTIC
STRIP MISCELLANEOUS
Qty: 300 EACH | Refills: 3 | Status: SHIPPED | OUTPATIENT
Start: 2020-03-02

## 2020-03-02 RX ORDER — BLOOD-GLUCOSE METER
EACH MISCELLANEOUS
Qty: 1 KIT | Refills: 0 | Status: SHIPPED | OUTPATIENT
Start: 2020-03-02

## 2020-03-02 NOTE — TELEPHONE ENCOUNTER
PT CALLED IN STATING THAT HER BLOOD TEST MACHINE BROKE AND NEEDS DR TO WRITE A SCRIPT FOR A NEW ONE.  PT NEEDS AN ACCU-CHEK PARRIS MACHINE.  PT ALSO NEEDS A NEW SCRIPT FOR ACCU-CHEK PARRIS PLUS test strip USE TO TEST TWICE A DAY #300    PLEASE SEND TO [facility] AND [address].     IF YOU HAVE ANY QUESTIONS PLEASE CONTACT PT [phone]

## 2020-06-04 RX ORDER — LOSARTAN POTASSIUM 50 MG/1
50 TABLET ORAL DAILY
Qty: 90 TABLET | Refills: 1 | Status: SHIPPED | OUTPATIENT
Start: 2020-06-04

## 2020-09-22 ENCOUNTER — HOSPITAL ENCOUNTER (OUTPATIENT)
Dept: GENERAL RADIOLOGY | Facility: HOSPITAL | Age: 80
Discharge: HOME | End: 2020-09-22

## 2020-09-22 ENCOUNTER — OFFICE VISIT (OUTPATIENT)
Dept: FAMILY MEDICINE CLINIC | Facility: CLINIC | Age: 80
End: 2020-09-22

## 2020-09-22 VITALS
DIASTOLIC BLOOD PRESSURE: 70 MMHG | TEMPERATURE: 97.3 F | HEIGHT: 63 IN | RESPIRATION RATE: 16 BRPM | SYSTOLIC BLOOD PRESSURE: 140 MMHG | OXYGEN SATURATION: 97 % | BODY MASS INDEX: 41.81 KG/M2 | HEART RATE: 62 BPM

## 2020-09-22 DIAGNOSIS — M25.531 RIGHT WRIST PAIN: Primary | ICD-10-CM

## 2020-09-22 DIAGNOSIS — M77.8 TENDINITIS OF RIGHT WRIST: ICD-10-CM

## 2020-09-22 DIAGNOSIS — M25.531 RIGHT WRIST PAIN: ICD-10-CM

## 2020-09-22 PROCEDURE — 73110 X-RAY EXAM OF WRIST: CPT

## 2020-09-22 PROCEDURE — 99213 OFFICE O/P EST LOW 20 MIN: CPT | Performed by: NURSE PRACTITIONER

## 2020-09-22 RX ORDER — MELOXICAM 7.5 MG/1
7.5 TABLET ORAL DAILY
Qty: 14 TABLET | Refills: 0 | Status: SHIPPED | OUTPATIENT
Start: 2020-09-22 | End: 2023-01-24 | Stop reason: HOSPADM

## 2020-09-22 RX ORDER — PREDNISONE 20 MG/1
20 TABLET ORAL 2 TIMES DAILY
Qty: 6 TABLET | Refills: 0 | Status: SHIPPED | OUTPATIENT
Start: 2020-09-22 | End: 2020-09-25

## 2020-09-22 NOTE — PATIENT INSTRUCTIONS
Pt encouraged to wear wrist brace for 4-8 weeks to help rest wrist.   Pt informed to do ROM exercises 3-4 times a day  Pt informed to use warm compresses to help with discomfort and may use otc topicals to help with discomfort.

## 2020-09-22 NOTE — PROGRESS NOTES
"Elisa Parisi is a 80 y.o.. female.     In wheel chair, right hand dominant, on computer a lot    Wrist Pain   Pain location: right wrist. This is a new problem. Episode onset: 2 weeks. There has been no history of extremity trauma. The problem has been unchanged. The quality of the pain is described as aching and sharp. Associated symptoms include a limited range of motion. Pertinent negatives include no fever, inability to bear weight, joint swelling, numbness or tingling. Exacerbated by: using to push up on wheel chair, movement with turning wrist/arm. Her past medical history is significant for diabetes and osteoarthritis. post polio syndrome       The following portions of the patient's history were reviewed and updated as appropriate: allergies, current medications, past family history, past medical history, past social history, past surgical history and problem list.    Past Medical History:   Diagnosis Date   • Arthritis    • Diabetes mellitus (CMS/HCC)    • Hyperlipidemia    • Hypertension    • Incontinence    • Obesity    • Sleep apnea        Past Surgical History:   Procedure Laterality Date   • APPENDECTOMY     • CATARACT EXTRACTION     • EYELID LACERATION REPAIR     • GALLBLADDER SURGERY     • HYSTERECTOMY     • KNEE SURGERY     • LUMBAR DISC SURGERY         Review of Systems   Constitutional: Negative for fever.   Musculoskeletal: Positive for arthralgias (right wrist).   Neurological: Negative for tingling and numbness.       No Known Allergies    Objective     Vitals:    09/22/20 1540 09/22/20 1603   BP: 153/75 140/70   BP Location: Left arm    Patient Position: Sitting    Cuff Size: Large Adult    Pulse: 62    Resp: 16    Temp: 97.3 °F (36.3 °C)    SpO2: 97%    Height: 160 cm (63\")      Body mass index is 41.81 kg/m².    Physical Exam  Vitals signs reviewed.   HENT:      Head: Normocephalic.   Eyes:      Pupils: Pupils are equal, round, and reactive to light.   Cardiovascular:      Rate " and Rhythm: Normal rate and regular rhythm.   Pulmonary:      Effort: Pulmonary effort is normal.      Breath sounds: Normal breath sounds.   Musculoskeletal:      Comments: Right wrist: limited ROM, discomfort noted to internal and external rotation, no discomfort to upward and downward movement, no swelling noted, no redness/bruising noted   Neurological:      Mental Status: She is alert and oriented to person, place, and time.   Psychiatric:         Behavior: Behavior normal.           Current Outpatient Medications:   •  ACCU-CHEK PARRIS PLUS test strip, USE TO TEST TWICE A DAY DX: TYPE 2 DIABETES MELLITUS ICD 10: E11.9, Disp: 300 each, Rfl: 3  •  ACCU-CHEK [place]IX [place] [facility], USE TO TEST TWICE A DAY, Disp: 204 each, Rfl: 3  •  atenolol (TENORMIN) 25 MG tablet, Take 1 tablet by mouth 2 (Two) Times a Day., Disp: 180 tablet, Rfl: 3  •  Blood Glucose Monitoring Suppl (ACCU-CHEK PARRIS PLUS) w/Device kit, Use to test twice a Day for Type 2 Diabetes ICD 10 E11.9 Pt's machine broke this is a replacement machine, Disp: 1 kit, Rfl: 0  •  citalopram (CeleXA) 10 MG tablet, Take 1 tablet by mouth Daily., Disp: 90 tablet, Rfl: 3  •  furosemide (LASIX) 20 MG tablet, Take 1 tablet by mouth Daily., Disp: 90 tablet, Rfl: 3  •  hydroCHLOROthiazide (HYDRODIURIL) 12.5 MG tablet, Take 1 tablet by mouth Daily., Disp: 90 tablet, Rfl: 1  •  levothyroxine (SYNTHROID, LEVOTHROID) 50 MCG tablet, Take 1 tablet by mouth Daily., Disp: 90 tablet, Rfl: 3  •  losartan (COZAAR) 50 MG tablet, Take 1 tablet by mouth Daily., Disp: 90 tablet, Rfl: 1  •  metFORMIN (GLUCOPHAGE) 500 MG tablet, Take 1 tablet by mouth Daily With Breakfast., Disp: 90 tablet, Rfl: 3  •  simvastatin (ZOCOR) 40 MG tablet, Take 1 tablet by mouth Every Night., Disp: 90 tablet, Rfl: 3  •  meloxicam (Mobic) 7.5 MG tablet, Take 1 tablet by mouth Daily., Disp: 14 tablet, Rfl: 0      Assessment/Plan   Alice was seen today for wrist pain.    Diagnoses and all orders for this  visit:    Right wrist pain  -     XR Wrist 3+ View Right; Future    Tendinitis of right wrist  -     predniSONE (DELTASONE) 20 MG tablet; Take 1 tablet by mouth 2 (Two) Times a Day for 3 days.  -     meloxicam (Mobic) 7.5 MG tablet; Take 1 tablet by mouth Daily.        Patient Instructions   Pt encouraged to wear wrist brace for 4-8 weeks to help rest wrist.   Pt informed to do ROM exercises 3-4 times a day  Pt informed to use warm compresses to help with discomfort and may use otc topicals to help with discomfort.         Return if symptoms worsen or fail to improve.

## 2020-12-14 ENCOUNTER — OFFICE VISIT (OUTPATIENT)
Dept: FAMILY MEDICINE CLINIC | Facility: CLINIC | Age: 80
End: 2020-12-14

## 2020-12-14 DIAGNOSIS — Z20.9 EXPOSURE TO COMMUNICABLE DISEASE: Primary | ICD-10-CM

## 2020-12-14 PROCEDURE — 99442 PR PHYS/QHP TELEPHONE EVALUATION 11-20 MIN: CPT | Performed by: NURSE PRACTITIONER

## 2020-12-14 NOTE — PATIENT INSTRUCTIONS
Drink plenty of fluids, rest, eat a healthy diet, monitor symptoms, if cough worsens call office; If having any other symptoms of Covid19 please call office. Once able to have Covid19 testing, should have done to rule out, pt informed she would have to call and schedule. Pt verb. Understanding.

## 2020-12-14 NOTE — PROGRESS NOTES
Subjective     Alice Parisi is a 80 y.o.. female.     This was an audio enabled telemedicine encounter.  You have chosen to receive care through a telephone visit. Do you consent to use a telephone visit for your medical care today? Yes    Pt stating she lives with her son and daughter in law. Pt stating her daughter in law tested positive for Covid19 2 days ago. Pt stating her son went and got tested and results still pending. Pt stating she is not having any symptoms. Pt stating she stays in her room most of the time.       The following portions of the patient's history were reviewed and updated as appropriate: allergies, current medications, past family history, past medical history, past social history, past surgical history and problem list.    Past Medical History:   Diagnosis Date   • Arthritis    • Diabetes mellitus (CMS/HCC)    • Hyperlipidemia    • Hypertension    • Incontinence    • Obesity    • Sleep apnea        Past Surgical History:   Procedure Laterality Date   • APPENDECTOMY     • CATARACT EXTRACTION     • EYELID LACERATION REPAIR     • GALLBLADDER SURGERY     • HYSTERECTOMY     • KNEE SURGERY     • LUMBAR DISC SURGERY         Review of Systems   Constitutional: Negative for fatigue and fever.   HENT: Negative for congestion, ear pain, rhinorrhea and sore throat.         No loss of taste/smell   Respiratory: Positive for cough (history of, not new, no changes). Negative for shortness of breath.    Cardiovascular: Positive for palpitations (not new, history of, no changes). Negative for chest pain.   Gastrointestinal: Negative for diarrhea, nausea and vomiting.   Musculoskeletal: Negative for arthralgias.   Neurological: Negative for dizziness, light-headedness and headaches.       No Known Allergies    Objective     There were no vitals filed for this visit.; telephone visit  There is no height or weight on file to calculate BMI.; telephone visit    Physical Exam; telephone visit      Current  Outpatient Medications:   •  ACCU-CHEK PARRIS PLUS test strip, USE TO TEST TWICE A DAY DX: TYPE 2 DIABETES MELLITUS ICD 10: E11.9, Disp: 300 each, Rfl: 3  •  ACCU-CHEK FASTCLIX LANCETS [facility], USE TO TEST TWICE A DAY, Disp: 204 each, Rfl: 3  •  atenolol (TENORMIN) 25 MG tablet, Take 1 tablet by mouth 2 (Two) Times a Day., Disp: 180 tablet, Rfl: 3  •  Blood Glucose Monitoring Suppl (ACCU-CHEK PARRIS PLUS) w/Device kit, Use to test twice a Day for Type 2 Diabetes ICD 10 E11.9 Pt's machine broke this is a replacement machine, Disp: 1 kit, Rfl: 0  •  citalopram (CeleXA) 10 MG tablet, Take 1 tablet by mouth Daily., Disp: 90 tablet, Rfl: 3  •  furosemide (LASIX) 20 MG tablet, Take 1 tablet by mouth Daily., Disp: 90 tablet, Rfl: 3  •  hydroCHLOROthiazide (HYDRODIURIL) 12.5 MG tablet, Take 1 tablet by mouth Daily., Disp: 90 tablet, Rfl: 1  •  levothyroxine (SYNTHROID, LEVOTHROID) 50 MCG tablet, Take 1 tablet by mouth Daily., Disp: 90 tablet, Rfl: 3  •  losartan (COZAAR) 50 MG tablet, Take 1 tablet by mouth Daily., Disp: 90 tablet, Rfl: 1  •  meloxicam (Mobic) 7.5 MG tablet, Take 1 tablet by mouth Daily., Disp: 14 tablet, Rfl: 0  •  metFORMIN (GLUCOPHAGE) 500 MG tablet, Take 1 tablet by mouth Daily With Breakfast., Disp: 90 tablet, Rfl: 3  •  simvastatin (ZOCOR) 40 MG tablet, Take 1 tablet by mouth Every Night., Disp: 90 tablet, Rfl: 3    Time: 20 minutes    Assessment/Plan   Diagnoses and all orders for this visit:    1. Exposure to communicable disease (Primary)        Patient Instructions   Drink plenty of fluids, rest, eat a healthy diet, monitor symptoms, if cough worsens call office; If having any other symptoms of Covid19 please call office. Once able to have Covid19 testing, should have done to rule out, pt informed she would have to call and schedule. Pt verb. Understanding.       Return if symptoms worsen or fail to improve.

## 2020-12-23 ENCOUNTER — TELEPHONE (OUTPATIENT)
Dept: FAMILY MEDICINE CLINIC | Facility: CLINIC | Age: 80
End: 2020-12-23

## 2020-12-23 NOTE — TELEPHONE ENCOUNTER
PATIENT CALLED AND WANTED TO ADVISE DR. MILLAN AND STAFF THAT HER INSURANCE WILL BE CHANGING TO [facility] MEDICARE AND ISN'T IN THE [employer].  PLEASE DO NOT SEND ANY PRESCRIPTIONS ON HER MEDICATIONS.  SHE IS LOOKING FOR A NEW PCP.    PLEASE CONTACT HER WITH ANY QUESTIONS @ [phone]

## 2020-12-23 NOTE — TELEPHONE ENCOUNTER
Patient called in stating same thing, just wanted an update of when she would receive a call back. I advised patient someone would call her back as soon as they could [phone]

## 2020-12-23 NOTE — TELEPHONE ENCOUNTER
Patient called in stating her daughter in law tested positive for the covid a few weeks back, now her granddaughter tested positive. She woke up with a sore throat today and wants to know if she should get tested?    Best call back # [phone]

## 2020-12-24 ENCOUNTER — TELEPHONE (OUTPATIENT)
Dept: FAMILY MEDICINE CLINIC | Facility: CLINIC | Age: 80
End: 2020-12-24

## 2020-12-24 NOTE — TELEPHONE ENCOUNTER
JACKLYN IS CALLING ON BEHALF OF THE PATIENT.  JACKLYN STATES THE PATIENT HAS STARTED COUGHING IN ADDITION TO HAVING THE SORE THROAT.  JACKLYN STATES THAT THEIR ENTIRE HOUSE HAS COVID AND IS CERTAIN THE PATIENT DOES AS WELL.    JACKLYN STATES THAT SHE WAS WANTING SOMETHING PREVENTATIVE TO BE CALLED IN IF POSSIBLE.    PLEASE ADVISE    JACKLYN CAN BE REACHED AT  [phone]     [facility]  [facility] #[id number] [place], [place] - [address] [address] AT [facility] - [phone]  - [phone]   [phone]

## 2020-12-28 NOTE — TELEPHONE ENCOUNTER
CALLED AND S/W PT AND ADVISED TO REST, DRINK FLUIDS, AND GO TO THE ER IF SHE HAS WORSENING SHORTNESS OF BREATH AND/OR HIGH FEVER. PT VOICED UNDERSTANDING.

## 2020-12-29 RX ORDER — LANCETS
EACH MISCELLANEOUS
Qty: 204 EACH | Refills: 3 | Status: SHIPPED | OUTPATIENT
Start: 2020-12-29

## 2020-12-29 NOTE — TELEPHONE ENCOUNTER
Caller: Alice Parisi    Relationship: Self    Best call back number: [phone]     Medication needed:   Requested Prescriptions     Pending Prescriptions Disp Refills   • Accu-Chek FastClix Lancets misc 204 each 3     Sig: USE TO TEST TWICE A DAY       When do you need the refill by: 01/01/21    What details did the patient provide when requesting the medication: SHE DOESN'T HAVE MANY LEFT  Does the patient have less than a 3 day supply:  [] Yes  [x] No    What is the patient's preferred pharmacy: [facility] #[id number] [address] AT [facility] [address] - [phone] [facility] [phone]

## 2021-01-07 ENCOUNTER — TELEPHONE (OUTPATIENT)
Dept: FAMILY MEDICINE CLINIC | Facility: CLINIC | Age: 81
End: 2021-01-07

## 2021-01-07 NOTE — TELEPHONE ENCOUNTER
SEGUN HDZ [facility]    D/c from [facility] yesterday covid pnuemonia- oked orders for  Skilled nursing- which will be 1st then when she is out of  quarentine  They will start PT and OT

## 2021-01-15 NOTE — TELEPHONE ENCOUNTER
WOB patient ASAP    [facility]  Roberta [phone]    Would like an order for a catheter for a couple of weeks- believes patient has  yeast infection  1st layer of skin is gone  Vaginal area to buttock area - thinks this would keep some of the moisture away    And would also like to get ok for Geena Magic Butt cream to use in that area - much discomfort for patient

## 2021-03-02 DIAGNOSIS — Z23 IMMUNIZATION DUE: ICD-10-CM

## 2023-01-21 ENCOUNTER — APPOINTMENT (OUTPATIENT)
Dept: CT IMAGING | Facility: HOSPITAL | Age: 83
End: 2023-01-21
Payer: MEDICARE

## 2023-01-21 PROCEDURE — 93005 ELECTROCARDIOGRAM TRACING: CPT | Performed by: EMERGENCY MEDICINE

## 2023-01-21 PROCEDURE — 70450 CT HEAD/BRAIN W/O DYE: CPT

## 2023-01-21 PROCEDURE — 99285 EMERGENCY DEPT VISIT HI MDM: CPT

## 2023-01-21 RX ORDER — SODIUM CHLORIDE 0.9 % (FLUSH) 0.9 %
10 SYRINGE (ML) INJECTION AS NEEDED
Status: DISCONTINUED | OUTPATIENT
Start: 2023-01-21 | End: 2023-01-24 | Stop reason: HOSPADM

## 2023-01-22 ENCOUNTER — APPOINTMENT (OUTPATIENT)
Dept: MRI IMAGING | Facility: HOSPITAL | Age: 83
End: 2023-01-22
Payer: MEDICARE

## 2023-01-22 ENCOUNTER — APPOINTMENT (OUTPATIENT)
Dept: CARDIOLOGY | Facility: HOSPITAL | Age: 83
End: 2023-01-22
Payer: MEDICARE

## 2023-01-22 ENCOUNTER — HOSPITAL ENCOUNTER (OUTPATIENT)
Facility: HOSPITAL | Age: 83
Setting detail: OBSERVATION
Discharge: NURSING, REHAB OR LONG-TERM CARE | End: 2023-01-24
Attending: EMERGENCY MEDICINE | Admitting: HOSPITALIST
Payer: MEDICARE

## 2023-01-22 DIAGNOSIS — G45.9 TIA (TRANSIENT ISCHEMIC ATTACK): Primary | ICD-10-CM

## 2023-01-22 LAB
ALBUMIN SERPL-MCNC: 3.8 G/DL (ref 3.5–5.2)
ALBUMIN/GLOB SERPL: 1.3 G/DL
ALP SERPL-CCNC: 83 U/L (ref 39–117)
ALT SERPL W P-5'-P-CCNC: 25 U/L (ref 1–33)
ANION GAP SERPL CALCULATED.3IONS-SCNC: 10.2 MMOL/L (ref 5–15)
ANION GAP SERPL CALCULATED.3IONS-SCNC: 8.7 MMOL/L (ref 5–15)
AORTIC DIMENSIONLESS INDEX: 0.4 (DI)
APTT PPP: 29.7 SECONDS (ref 22.7–35.4)
AST SERPL-CCNC: 22 U/L (ref 1–32)
BACTERIA UR QL AUTO: ABNORMAL /HPF
BASOPHILS # BLD AUTO: 0.05 10*3/MM3 (ref 0–0.2)
BASOPHILS # BLD AUTO: 0.06 10*3/MM3 (ref 0–0.2)
BASOPHILS NFR BLD AUTO: 0.5 % (ref 0–1.5)
BASOPHILS NFR BLD AUTO: 0.6 % (ref 0–1.5)
BH CV ECHO MEAS - AO MAX PG: 19.4 MMHG
BH CV ECHO MEAS - AO MEAN PG: 11.2 MMHG
BH CV ECHO MEAS - AO V2 MAX: 220.3 CM/SEC
BH CV ECHO MEAS - AO V2 VTI: 50.3 CM
BH CV ECHO MEAS - AVA(I,D): 1.8 CM2
BH CV ECHO MEAS - EDV(CUBED): 102.3 ML
BH CV ECHO MEAS - EDV(MOD-SP2): 114 ML
BH CV ECHO MEAS - EDV(MOD-SP4): 109 ML
BH CV ECHO MEAS - EF(MOD-BP): 53.4 %
BH CV ECHO MEAS - EF(MOD-SP2): 56.1 %
BH CV ECHO MEAS - EF(MOD-SP4): 51.4 %
BH CV ECHO MEAS - ESV(CUBED): 38.5 ML
BH CV ECHO MEAS - ESV(MOD-SP2): 50 ML
BH CV ECHO MEAS - ESV(MOD-SP4): 53 ML
BH CV ECHO MEAS - FS: 27.8 %
BH CV ECHO MEAS - IVS/LVPW: 0.84 CM
BH CV ECHO MEAS - IVSD: 1.05 CM
BH CV ECHO MEAS - LAT PEAK E' VEL: 7.7 CM/SEC
BH CV ECHO MEAS - LV DIASTOLIC VOL/BSA (35-75): 53.4 CM2
BH CV ECHO MEAS - LV MASS(C)D: 198.4 GRAMS
BH CV ECHO MEAS - LV MAX PG: 3.3 MMHG
BH CV ECHO MEAS - LV MEAN PG: 1.95 MMHG
BH CV ECHO MEAS - LV SYSTOLIC VOL/BSA (12-30): 26 CM2
BH CV ECHO MEAS - LV V1 MAX: 90.6 CM/SEC
BH CV ECHO MEAS - LV V1 VTI: 21.4 CM
BH CV ECHO MEAS - LVIDD: 4.7 CM
BH CV ECHO MEAS - LVIDS: 3.4 CM
BH CV ECHO MEAS - LVOT AREA: 4.2 CM2
BH CV ECHO MEAS - LVOT DIAM: 2.32 CM
BH CV ECHO MEAS - LVPWD: 1.25 CM
BH CV ECHO MEAS - MED PEAK E' VEL: 7.2 CM/SEC
BH CV ECHO MEAS - MV A DUR: 0.14 SEC
BH CV ECHO MEAS - MV A MAX VEL: 113.8 CM/SEC
BH CV ECHO MEAS - MV DEC SLOPE: 346.5 CM/SEC2
BH CV ECHO MEAS - MV DEC TIME: 170 MSEC
BH CV ECHO MEAS - MV E MAX VEL: 129 CM/SEC
BH CV ECHO MEAS - MV E/A: 1.13
BH CV ECHO MEAS - MV MAX PG: 6.8 MMHG
BH CV ECHO MEAS - MV MEAN PG: 2.9 MMHG
BH CV ECHO MEAS - MV P1/2T: 99 MSEC
BH CV ECHO MEAS - MV V2 VTI: 43.3 CM
BH CV ECHO MEAS - MVA(P1/2T): 2.22 CM2
BH CV ECHO MEAS - MVA(VTI): 2.1 CM2
BH CV ECHO MEAS - PA ACC TIME: 0.1 SEC
BH CV ECHO MEAS - PA PR(ACCEL): 33.8 MMHG
BH CV ECHO MEAS - PA V2 MAX: 115.4 CM/SEC
BH CV ECHO MEAS - PULM A REVS DUR: 0.14 SEC
BH CV ECHO MEAS - PULM A REVS VEL: 29.7 CM/SEC
BH CV ECHO MEAS - PULM DIAS VEL: 51.4 CM/SEC
BH CV ECHO MEAS - PULM S/D: 1.71
BH CV ECHO MEAS - PULM SYS VEL: 88 CM/SEC
BH CV ECHO MEAS - RAP SYSTOLE: 3 MMHG
BH CV ECHO MEAS - RV MAX PG: 2.8 MMHG
BH CV ECHO MEAS - RV V1 MAX: 84 CM/SEC
BH CV ECHO MEAS - RV V1 VTI: 16.8 CM
BH CV ECHO MEAS - SI(MOD-SP2): 31.4 ML/M2
BH CV ECHO MEAS - SI(MOD-SP4): 27.4 ML/M2
BH CV ECHO MEAS - SV(LVOT): 90.7 ML
BH CV ECHO MEAS - SV(MOD-SP2): 64 ML
BH CV ECHO MEAS - SV(MOD-SP4): 56 ML
BH CV ECHO MEAS - TAPSE (>1.6): 3.2 CM
BH CV ECHO MEASUREMENTS AVERAGE E/E' RATIO: 17.32
BH CV ECHO SHUNT ASSESSMENT PERFORMED (HIDDEN SCRIPTING): 1
BH CV XLRA - RV BASE: 3.6 CM
BH CV XLRA - RV LENGTH: 7.3 CM
BH CV XLRA - RV MID: 3 CM
BH CV XLRA - TDI S': 11.1 CM/SEC
BILIRUB SERPL-MCNC: 0.3 MG/DL (ref 0–1.2)
BILIRUB UR QL STRIP: NEGATIVE
BUN SERPL-MCNC: 16 MG/DL (ref 8–23)
BUN SERPL-MCNC: 18 MG/DL (ref 8–23)
BUN/CREAT SERPL: 29 (ref 7–25)
BUN/CREAT SERPL: 30.8 (ref 7–25)
CALCIUM SPEC-SCNC: 10.1 MG/DL (ref 8.6–10.5)
CALCIUM SPEC-SCNC: 9.6 MG/DL (ref 8.6–10.5)
CHLORIDE SERPL-SCNC: 102 MMOL/L (ref 98–107)
CHLORIDE SERPL-SCNC: 106 MMOL/L (ref 98–107)
CHOLEST SERPL-MCNC: 151 MG/DL (ref 0–200)
CLARITY UR: CLEAR
CO2 SERPL-SCNC: 26.8 MMOL/L (ref 22–29)
CO2 SERPL-SCNC: 28.3 MMOL/L (ref 22–29)
COLOR UR: YELLOW
CREAT SERPL-MCNC: 0.52 MG/DL (ref 0.57–1)
CREAT SERPL-MCNC: 0.62 MG/DL (ref 0.57–1)
CRP SERPL-MCNC: 0.58 MG/DL (ref 0–0.5)
DEPRECATED RDW RBC AUTO: 39.6 FL (ref 37–54)
DEPRECATED RDW RBC AUTO: 40.8 FL (ref 37–54)
EGFRCR SERPLBLD CKD-EPI 2021: 89 ML/MIN/1.73
EGFRCR SERPLBLD CKD-EPI 2021: 92.9 ML/MIN/1.73
EOSINOPHIL # BLD AUTO: 0.21 10*3/MM3 (ref 0–0.4)
EOSINOPHIL # BLD AUTO: 0.22 10*3/MM3 (ref 0–0.4)
EOSINOPHIL NFR BLD AUTO: 2 % (ref 0.3–6.2)
EOSINOPHIL NFR BLD AUTO: 2.2 % (ref 0.3–6.2)
ERYTHROCYTE [DISTWIDTH] IN BLOOD BY AUTOMATED COUNT: 11.9 % (ref 12.3–15.4)
ERYTHROCYTE [DISTWIDTH] IN BLOOD BY AUTOMATED COUNT: 12.1 % (ref 12.3–15.4)
ERYTHROCYTE [SEDIMENTATION RATE] IN BLOOD: 7 MM/HR (ref 0–30)
GLOBULIN UR ELPH-MCNC: 3 GM/DL
GLUCOSE BLDC GLUCOMTR-MCNC: 130 MG/DL (ref 70–130)
GLUCOSE BLDC GLUCOMTR-MCNC: 138 MG/DL (ref 70–130)
GLUCOSE BLDC GLUCOMTR-MCNC: 163 MG/DL (ref 70–130)
GLUCOSE BLDC GLUCOMTR-MCNC: 173 MG/DL (ref 70–130)
GLUCOSE BLDC GLUCOMTR-MCNC: 206 MG/DL (ref 70–130)
GLUCOSE SERPL-MCNC: 169 MG/DL (ref 65–99)
GLUCOSE SERPL-MCNC: 199 MG/DL (ref 65–99)
GLUCOSE UR STRIP-MCNC: NEGATIVE MG/DL
HBA1C MFR BLD: 8.4 % (ref 4.8–5.6)
HCT VFR BLD AUTO: 37.7 % (ref 34–46.6)
HCT VFR BLD AUTO: 41.5 % (ref 34–46.6)
HDLC SERPL-MCNC: 56 MG/DL (ref 40–60)
HGB BLD-MCNC: 13 G/DL (ref 12–15.9)
HGB BLD-MCNC: 14.3 G/DL (ref 12–15.9)
HGB UR QL STRIP.AUTO: NEGATIVE
HYALINE CASTS UR QL AUTO: ABNORMAL /LPF
IMM GRANULOCYTES # BLD AUTO: 0.02 10*3/MM3 (ref 0–0.05)
IMM GRANULOCYTES # BLD AUTO: 0.02 10*3/MM3 (ref 0–0.05)
IMM GRANULOCYTES NFR BLD AUTO: 0.2 % (ref 0–0.5)
IMM GRANULOCYTES NFR BLD AUTO: 0.2 % (ref 0–0.5)
INR PPP: 1.01 (ref 0.9–1.1)
KETONES UR QL STRIP: NEGATIVE
LDLC SERPL CALC-MCNC: 75 MG/DL (ref 0–100)
LDLC/HDLC SERPL: 1.29 {RATIO}
LEFT ATRIUM VOLUME INDEX: 26.4 ML/M2
LEUKOCYTE ESTERASE UR QL STRIP.AUTO: ABNORMAL
LYMPHOCYTES # BLD AUTO: 2.28 10*3/MM3 (ref 0.7–3.1)
LYMPHOCYTES # BLD AUTO: 2.83 10*3/MM3 (ref 0.7–3.1)
LYMPHOCYTES NFR BLD AUTO: 22.2 % (ref 19.6–45.3)
LYMPHOCYTES NFR BLD AUTO: 28.5 % (ref 19.6–45.3)
MAXIMAL PREDICTED HEART RATE: 138 BPM
MCH RBC QN AUTO: 31.2 PG (ref 26.6–33)
MCH RBC QN AUTO: 31.8 PG (ref 26.6–33)
MCHC RBC AUTO-ENTMCNC: 34.5 G/DL (ref 31.5–35.7)
MCHC RBC AUTO-ENTMCNC: 34.5 G/DL (ref 31.5–35.7)
MCV RBC AUTO: 90.6 FL (ref 79–97)
MCV RBC AUTO: 92.2 FL (ref 79–97)
MONOCYTES # BLD AUTO: 0.72 10*3/MM3 (ref 0.1–0.9)
MONOCYTES # BLD AUTO: 0.92 10*3/MM3 (ref 0.1–0.9)
MONOCYTES NFR BLD AUTO: 7 % (ref 5–12)
MONOCYTES NFR BLD AUTO: 9.3 % (ref 5–12)
NEUTROPHILS NFR BLD AUTO: 5.87 10*3/MM3 (ref 1.7–7)
NEUTROPHILS NFR BLD AUTO: 59.2 % (ref 42.7–76)
NEUTROPHILS NFR BLD AUTO: 6.98 10*3/MM3 (ref 1.7–7)
NEUTROPHILS NFR BLD AUTO: 68.1 % (ref 42.7–76)
NITRITE UR QL STRIP: NEGATIVE
NRBC BLD AUTO-RTO: 0 /100 WBC (ref 0–0.2)
NRBC BLD AUTO-RTO: 0.1 /100 WBC (ref 0–0.2)
PH UR STRIP.AUTO: 5.5 [PH] (ref 5–8)
PLATELET # BLD AUTO: 179 10*3/MM3 (ref 140–450)
PLATELET # BLD AUTO: 193 10*3/MM3 (ref 140–450)
PMV BLD AUTO: 11.4 FL (ref 6–12)
PMV BLD AUTO: 11.6 FL (ref 6–12)
POTASSIUM SERPL-SCNC: 3.9 MMOL/L (ref 3.5–5.2)
POTASSIUM SERPL-SCNC: 4 MMOL/L (ref 3.5–5.2)
PROT SERPL-MCNC: 6.8 G/DL (ref 6–8.5)
PROT UR QL STRIP: NEGATIVE
PROTHROMBIN TIME: 13.4 SECONDS (ref 11.7–14.2)
QT INTERVAL: 416 MS
RBC # BLD AUTO: 4.09 10*6/MM3 (ref 3.77–5.28)
RBC # BLD AUTO: 4.58 10*6/MM3 (ref 3.77–5.28)
RBC # UR STRIP: ABNORMAL /HPF
REF LAB TEST METHOD: ABNORMAL
SINUS: 3 CM
SODIUM SERPL-SCNC: 139 MMOL/L (ref 136–145)
SODIUM SERPL-SCNC: 143 MMOL/L (ref 136–145)
SP GR UR STRIP: 1.01 (ref 1–1.03)
SQUAMOUS #/AREA URNS HPF: ABNORMAL /HPF
STRESS TARGET HR: 117 BPM
TRIGL SERPL-MCNC: 113 MG/DL (ref 0–150)
TROPONIN T SERPL-MCNC: <0.01 NG/ML (ref 0–0.03)
TROPONIN T SERPL-MCNC: <0.01 NG/ML (ref 0–0.03)
UROBILINOGEN UR QL STRIP: ABNORMAL
VLDLC SERPL-MCNC: 20 MG/DL (ref 5–40)
WBC # UR STRIP: ABNORMAL /HPF
WBC NRBC COR # BLD: 10.26 10*3/MM3 (ref 3.4–10.8)
WBC NRBC COR # BLD: 9.92 10*3/MM3 (ref 3.4–10.8)

## 2023-01-22 PROCEDURE — 80053 COMPREHEN METABOLIC PANEL: CPT | Performed by: NURSE PRACTITIONER

## 2023-01-22 PROCEDURE — 85652 RBC SED RATE AUTOMATED: CPT | Performed by: STUDENT IN AN ORGANIZED HEALTH CARE EDUCATION/TRAINING PROGRAM

## 2023-01-22 PROCEDURE — G0378 HOSPITAL OBSERVATION PER HR: HCPCS

## 2023-01-22 PROCEDURE — 85025 COMPLETE CBC W/AUTO DIFF WBC: CPT | Performed by: NURSE PRACTITIONER

## 2023-01-22 PROCEDURE — 36415 COLL VENOUS BLD VENIPUNCTURE: CPT | Performed by: NURSE PRACTITIONER

## 2023-01-22 PROCEDURE — 82962 GLUCOSE BLOOD TEST: CPT

## 2023-01-22 PROCEDURE — 97162 PT EVAL MOD COMPLEX 30 MIN: CPT

## 2023-01-22 PROCEDURE — 99204 OFFICE O/P NEW MOD 45 MIN: CPT | Performed by: PSYCHIATRY & NEUROLOGY

## 2023-01-22 PROCEDURE — 93306 TTE W/DOPPLER COMPLETE: CPT | Performed by: INTERNAL MEDICINE

## 2023-01-22 PROCEDURE — 85025 COMPLETE CBC W/AUTO DIFF WBC: CPT | Performed by: EMERGENCY MEDICINE

## 2023-01-22 PROCEDURE — 85730 THROMBOPLASTIN TIME PARTIAL: CPT | Performed by: EMERGENCY MEDICINE

## 2023-01-22 PROCEDURE — 93306 TTE W/DOPPLER COMPLETE: CPT

## 2023-01-22 PROCEDURE — 86140 C-REACTIVE PROTEIN: CPT | Performed by: STUDENT IN AN ORGANIZED HEALTH CARE EDUCATION/TRAINING PROGRAM

## 2023-01-22 PROCEDURE — 85610 PROTHROMBIN TIME: CPT | Performed by: EMERGENCY MEDICINE

## 2023-01-22 PROCEDURE — 80061 LIPID PANEL: CPT | Performed by: NURSE PRACTITIONER

## 2023-01-22 PROCEDURE — 84484 ASSAY OF TROPONIN QUANT: CPT | Performed by: EMERGENCY MEDICINE

## 2023-01-22 PROCEDURE — 93010 ELECTROCARDIOGRAM REPORT: CPT | Performed by: INTERNAL MEDICINE

## 2023-01-22 PROCEDURE — 83036 HEMOGLOBIN GLYCOSYLATED A1C: CPT | Performed by: NURSE PRACTITIONER

## 2023-01-22 PROCEDURE — 81001 URINALYSIS AUTO W/SCOPE: CPT | Performed by: EMERGENCY MEDICINE

## 2023-01-22 PROCEDURE — 97166 OT EVAL MOD COMPLEX 45 MIN: CPT

## 2023-01-22 PROCEDURE — 63710000001 INSULIN LISPRO (HUMAN) PER 5 UNITS: Performed by: NURSE PRACTITIONER

## 2023-01-22 PROCEDURE — 96360 HYDRATION IV INFUSION INIT: CPT

## 2023-01-22 PROCEDURE — P9612 CATHETERIZE FOR URINE SPEC: HCPCS

## 2023-01-22 PROCEDURE — 92610 EVALUATE SWALLOWING FUNCTION: CPT

## 2023-01-22 PROCEDURE — 70551 MRI BRAIN STEM W/O DYE: CPT

## 2023-01-22 PROCEDURE — 97110 THERAPEUTIC EXERCISES: CPT

## 2023-01-22 PROCEDURE — 97530 THERAPEUTIC ACTIVITIES: CPT

## 2023-01-22 RX ORDER — ATENOLOL 25 MG/1
25 TABLET ORAL 2 TIMES DAILY
Status: DISCONTINUED | OUTPATIENT
Start: 2023-01-22 | End: 2023-01-24 | Stop reason: HOSPADM

## 2023-01-22 RX ORDER — UREA 10 %
2 LOTION (ML) TOPICAL NIGHTLY PRN
Status: DISCONTINUED | OUTPATIENT
Start: 2023-01-22 | End: 2023-01-24 | Stop reason: HOSPADM

## 2023-01-22 RX ORDER — HYDROCODONE BITARTRATE AND ACETAMINOPHEN 5; 325 MG/1; MG/1
1 TABLET ORAL EVERY 6 HOURS PRN
Status: DISCONTINUED | OUTPATIENT
Start: 2023-01-22 | End: 2023-01-24 | Stop reason: HOSPADM

## 2023-01-22 RX ORDER — OXYBUTYNIN CHLORIDE 5 MG/1
5 TABLET ORAL 3 TIMES DAILY
Status: DISCONTINUED | OUTPATIENT
Start: 2023-01-22 | End: 2023-01-24 | Stop reason: HOSPADM

## 2023-01-22 RX ORDER — NICOTINE POLACRILEX 4 MG
15 LOZENGE BUCCAL
Status: DISCONTINUED | OUTPATIENT
Start: 2023-01-22 | End: 2023-01-24 | Stop reason: HOSPADM

## 2023-01-22 RX ORDER — LOSARTAN POTASSIUM 50 MG/1
50 TABLET ORAL DAILY
Status: DISCONTINUED | OUTPATIENT
Start: 2023-01-22 | End: 2023-01-24 | Stop reason: HOSPADM

## 2023-01-22 RX ORDER — ASPIRIN 300 MG/1
300 SUPPOSITORY RECTAL DAILY
Status: DISCONTINUED | OUTPATIENT
Start: 2023-01-22 | End: 2023-01-24

## 2023-01-22 RX ORDER — ACETAMINOPHEN 650 MG/1
650 SUPPOSITORY RECTAL EVERY 4 HOURS PRN
Status: DISCONTINUED | OUTPATIENT
Start: 2023-01-22 | End: 2023-01-24 | Stop reason: HOSPADM

## 2023-01-22 RX ORDER — INSULIN LISPRO 100 [IU]/ML
0-7 INJECTION, SOLUTION INTRAVENOUS; SUBCUTANEOUS
Status: DISCONTINUED | OUTPATIENT
Start: 2023-01-22 | End: 2023-01-24 | Stop reason: HOSPADM

## 2023-01-22 RX ORDER — SODIUM CHLORIDE 9 MG/ML
40 INJECTION, SOLUTION INTRAVENOUS AS NEEDED
Status: DISCONTINUED | OUTPATIENT
Start: 2023-01-22 | End: 2023-01-24 | Stop reason: HOSPADM

## 2023-01-22 RX ORDER — HYDROCODONE BITARTRATE AND ACETAMINOPHEN 5; 325 MG/1; MG/1
1 TABLET ORAL EVERY 6 HOURS PRN
Status: ON HOLD | COMMUNITY
End: 2023-01-24 | Stop reason: SDUPTHER

## 2023-01-22 RX ORDER — SODIUM CHLORIDE 0.9 % (FLUSH) 0.9 %
10 SYRINGE (ML) INJECTION EVERY 12 HOURS SCHEDULED
Status: DISCONTINUED | OUTPATIENT
Start: 2023-01-22 | End: 2023-01-24 | Stop reason: HOSPADM

## 2023-01-22 RX ORDER — ACETAMINOPHEN 325 MG/1
650 TABLET ORAL EVERY 4 HOURS PRN
Status: DISCONTINUED | OUTPATIENT
Start: 2023-01-22 | End: 2023-01-24 | Stop reason: HOSPADM

## 2023-01-22 RX ORDER — CITALOPRAM 10 MG/1
10 TABLET ORAL DAILY
Status: DISCONTINUED | OUTPATIENT
Start: 2023-01-22 | End: 2023-01-24 | Stop reason: HOSPADM

## 2023-01-22 RX ORDER — ONDANSETRON 2 MG/ML
4 INJECTION INTRAMUSCULAR; INTRAVENOUS EVERY 6 HOURS PRN
Status: DISCONTINUED | OUTPATIENT
Start: 2023-01-22 | End: 2023-01-24 | Stop reason: HOSPADM

## 2023-01-22 RX ORDER — ATORVASTATIN CALCIUM 80 MG/1
80 TABLET, FILM COATED ORAL NIGHTLY
Status: DISCONTINUED | OUTPATIENT
Start: 2023-01-22 | End: 2023-01-24

## 2023-01-22 RX ORDER — ACETAMINOPHEN 160 MG/5ML
650 SOLUTION ORAL EVERY 4 HOURS PRN
Status: DISCONTINUED | OUTPATIENT
Start: 2023-01-22 | End: 2023-01-24 | Stop reason: HOSPADM

## 2023-01-22 RX ORDER — FUROSEMIDE 20 MG/1
20 TABLET ORAL DAILY
Status: DISCONTINUED | OUTPATIENT
Start: 2023-01-23 | End: 2023-01-24 | Stop reason: HOSPADM

## 2023-01-22 RX ORDER — NYSTATIN 100000 U/G
1 CREAM TOPICAL AS NEEDED
COMMUNITY

## 2023-01-22 RX ORDER — LEVOTHYROXINE SODIUM 0.05 MG/1
50 TABLET ORAL DAILY
Status: DISCONTINUED | OUTPATIENT
Start: 2023-01-22 | End: 2023-01-24 | Stop reason: HOSPADM

## 2023-01-22 RX ORDER — DEXTROSE MONOHYDRATE 25 G/50ML
25 INJECTION, SOLUTION INTRAVENOUS
Status: DISCONTINUED | OUTPATIENT
Start: 2023-01-22 | End: 2023-01-24 | Stop reason: HOSPADM

## 2023-01-22 RX ORDER — UREA 10 %
2 LOTION (ML) TOPICAL NIGHTLY PRN
COMMUNITY

## 2023-01-22 RX ORDER — OXYBUTYNIN CHLORIDE 5 MG/1
5 TABLET ORAL 3 TIMES DAILY
COMMUNITY

## 2023-01-22 RX ORDER — SODIUM CHLORIDE 9 MG/ML
100 INJECTION, SOLUTION INTRAVENOUS CONTINUOUS
Status: DISCONTINUED | OUTPATIENT
Start: 2023-01-22 | End: 2023-01-24 | Stop reason: HOSPADM

## 2023-01-22 RX ORDER — SODIUM CHLORIDE 0.9 % (FLUSH) 0.9 %
10 SYRINGE (ML) INJECTION AS NEEDED
Status: DISCONTINUED | OUTPATIENT
Start: 2023-01-22 | End: 2023-01-24 | Stop reason: HOSPADM

## 2023-01-22 RX ORDER — ASPIRIN 325 MG
325 TABLET ORAL DAILY
Status: DISCONTINUED | OUTPATIENT
Start: 2023-01-22 | End: 2023-01-24

## 2023-01-22 RX ADMIN — OXYBUTYNIN CHLORIDE 5 MG: 5 TABLET ORAL at 16:34

## 2023-01-22 RX ADMIN — ATORVASTATIN CALCIUM 80 MG: 80 TABLET, FILM COATED ORAL at 21:11

## 2023-01-22 RX ADMIN — Medication 10 ML: at 03:26

## 2023-01-22 RX ADMIN — CITALOPRAM 10 MG: 10 TABLET, FILM COATED ORAL at 13:19

## 2023-01-22 RX ADMIN — ATENOLOL 25 MG: 25 TABLET ORAL at 16:34

## 2023-01-22 RX ADMIN — OXYBUTYNIN CHLORIDE 5 MG: 5 TABLET ORAL at 13:19

## 2023-01-22 RX ADMIN — LOSARTAN POTASSIUM 50 MG: 50 TABLET, FILM COATED ORAL at 16:34

## 2023-01-22 RX ADMIN — SODIUM CHLORIDE 100 ML/HR: 9 INJECTION, SOLUTION INTRAVENOUS at 04:13

## 2023-01-22 RX ADMIN — Medication 10 ML: at 21:12

## 2023-01-22 RX ADMIN — INSULIN LISPRO 2 UNITS: 100 INJECTION, SOLUTION INTRAVENOUS; SUBCUTANEOUS at 18:51

## 2023-01-22 RX ADMIN — LEVOTHYROXINE SODIUM 50 MCG: 0.05 TABLET ORAL at 13:19

## 2023-01-22 RX ADMIN — ASPIRIN 325 MG: 325 TABLET ORAL at 13:19

## 2023-01-22 RX ADMIN — ATORVASTATIN CALCIUM 80 MG: 80 TABLET, FILM COATED ORAL at 03:43

## 2023-01-22 RX ADMIN — OXYBUTYNIN CHLORIDE 5 MG: 5 TABLET ORAL at 21:11

## 2023-01-22 NOTE — ED TRIAGE NOTES
While moving pt from stretcher area to triage bay to await room assignment, no slurred speech noted.

## 2023-01-22 NOTE — SIGNIFICANT NOTE
Pt had polio since since she was [age] and has left leg weakness. It is not a new stroke symptom.

## 2023-01-22 NOTE — PLAN OF CARE
Goal Outcome Evaluation:  Plan of Care Reviewed With: patient        Pt admitted with stroke like symptoms slurred speech MRI pending, non ambulatory at baseline. This visit she was able to sit edge of bed with modA and do several exercises. She tolerated well and enjoys her AROM exercises that she does regularly. She uses w/c at baseline and able to do w/c transfers. She will benefit from physical therapy while in the hospital. Anticipates d/c home vs snf depending on progression with mobility.

## 2023-01-22 NOTE — CONSULTS
Stroke Consult Note    Patient Name: Alice Parisi   MRN: [id number]  Age: 82 y.o.  Sex: female  : 1940    Primary Care Physician: Geronimo Fischer MD  Referring Physician:  Greg Knight MD    Handedness: Right  Race: White    Chief Complaint/Reason for Consultation: Slurred speech     Subjective .  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  Per patient, her slurred speech started after she got some medication at the facility, and believes that is what caused her to have slurred speech.  There was no other symptoms of focal weakness/numbness/vision changes/word finding difficulty.  She is back to her baseline.    Last Known Normal Date/Time: 9 PM EST     Review of Systems   Neurological: Positive for speech difficulty.   All other systems reviewed and are negative.     Past Medical History:   Diagnosis Date   • Arthritis    • Depression    • Diabetes mellitus (HCC)    • Hyperlipidemia    • Hypertension    • Incontinence    • Insomnia    • Obesity    • Postpolio syndrome    • Sleep apnea    • UTI (urinary tract infection)      Past Surgical History:   Procedure Laterality Date   • APPENDECTOMY     • CATARACT EXTRACTION     • EYELID LACERATION REPAIR     • GALLBLADDER SURGERY     • HYSTERECTOMY     • KNEE SURGERY     • LUMBAR DISC SURGERY       Family History   Problem Relation Age of Onset   • Diabetes Mother    • Diabetes Brother    • Heart disease Brother      Social History     Socioeconomic History   • Marital status:    Tobacco Use   • Smoking status: Never   • Smokeless tobacco: Never   Vaping Use   • Vaping Use: Never used   Substance and Sexual Activity   • Alcohol use: No   • Drug use: Never   • Sexual  activity: Not Currently     Allergies   Allergen Reactions   • Shellfish-Derived Products Swelling     Prior to Admission medications    Medication Sig Start Date End Date Taking? Authorizing Provider   ACCU-CHEK PARRIS PLUS test strip USE TO TEST TWICE A DAY DX: TYPE 2 DIABETES MELLITUS ICD 10: E11.9 3/2/20  Yes Geronimo Fischer MD   Accu-Chek FastClix Lancets misc USE TO TEST TWICE A DAY 12/29/20  Yes Jacob Persaud MD   Blood Glucose Monitoring Suppl (ACCU-CHEK PARRIS PLUS) w/Device kit Use to test twice a Day for Type 2 Diabetes ICD 10 E11.9 Pt's machine broke this is a replacement machine 3/2/20  Yes Geronimo Fischer MD   citalopram (CeleXA) 10 MG tablet Take 1 tablet by mouth Daily. 1/13/20  Yes Geronimo Fischer MD   furosemide (LASIX) 20 MG tablet Take 1 tablet by mouth Daily. 2/21/19  Yes Geronimo Fischer MD   HYDROcodone-acetaminophen (NORCO) 5-325 MG per tablet Take 1 tablet by mouth Every 6 (Six) Hours As Needed for Mild Pain or Moderate Pain.   Yes Lisette Cook MD   levothyroxine (SYNTHROID, LEVOTHROID) 50 MCG tablet Take 1 tablet by mouth Daily. 1/13/20  Yes Geronimo Fischer MD   losartan (COZAAR) 50 MG tablet Take 1 tablet by mouth Daily.  Patient taking differently: Take 75 mg by mouth Daily. 6/4/20  Yes Geronimo Fischer MD   melatonin 1 MG tablet Take 2 mg by mouth At Night As Needed for Sleep.   Yes Lisette Cook MD   metFORMIN (GLUCOPHAGE) 500 MG tablet Take 1 tablet by mouth Daily With Breakfast. 1/13/20  Yes Geronimo Fischer MD   mupirocin (BACTROBAN) 2 % ointment Apply 1 application topically to the appropriate area as directed Daily. Apply to open area on left thigh   Yes Lisette Cook MD   oxybutynin (DITROPAN) 5 MG tablet Take 5 mg by mouth 3 (Three) Times a Day.   Yes Lisette Cook MD   simvastatin (ZOCOR) 40 MG tablet Take 1 tablet by mouth Every Night.  Patient taking differently: Take 20 mg by mouth Every Night. 1/13/20  Yes Aaliyah  Geronimo FERNANDEZ MD   atenolol (TENORMIN) 25 MG tablet Take 1 tablet by mouth 2 (Two) Times a Day. 1/13/20   Geronimo Fischer MD   hydroCHLOROthiazide (HYDRODIURIL) 12.5 MG tablet Take 1 tablet by mouth Daily. 11/26/19   Geronimo Fischer MD   Hydrocortisone (Geena's magic butt cream) Apply 1 application topically to the appropriate area as directed As Needed (infection/discomfort). 1/15/21   Geronimo Fischer MD   meloxicam (Mobic) 7.5 MG tablet Take 1 tablet by mouth Daily. 9/22/20   Arti Benítez APRN   nystatin (MYCOSTATIN) 521207 UNIT/GM cream Apply 1 application topically to the appropriate area as directed As Needed.    Provider, MD Lisette             Objective     Temp:  [97.6 °F (36.4 °C)-98 °F (36.7 °C)] 98 °F (36.7 °C)  Heart Rate:  [61-74] 65  Resp:  [16-18] 18  BP: (124-164)/(62-85) 164/76  Neurological Exam  Mental Status  Awake, alert and oriented to person, place and time.Alert. Speech is normal. Language is fluent with no aphasia.    Cranial Nerves  CN II: Visual fields full to confrontation.  CN III, IV, VI: Extraocular movements intact bilaterally. Normal lids and orbits bilaterally. Pupils equal round and reactive to light bilaterally.  CN V: Facial sensation is normal.  CN VII: Full and symmetric facial movement.  CN IX, X: Palate elevates symmetrically  CN XI: Shoulder shrug strength is normal.  CN XII: Tongue midline without atrophy or fasciculations.    Motor  Normal muscle bulk throughout. No fasciculations present. Normal muscle tone.  Left lower extremity is 2/5, all other extremities are 5/5 this is baseline.    Sensory  Light touch is normal in upper and lower extremities.     Coordination    No dysmetria.    Gait    Not assessed.      Physical Exam  Vitals and nursing note reviewed.   Constitutional:       Appearance: Normal appearance. She is obese.   HENT:      Head: Normocephalic and atraumatic.   Eyes:      General: Lids are normal.      Extraocular Movements: Extraocular  movements intact.      Pupils: Pupils are equal, round, and reactive to light.   Cardiovascular:      Rate and Rhythm: Normal rate and regular rhythm.   Pulmonary:      Effort: Pulmonary effort is normal. No respiratory distress.   Musculoskeletal:      Cervical back: Normal range of motion and neck supple.   Neurological:      Mental Status: She is alert.   Psychiatric:         Mood and Affect: Mood normal.         Speech: Speech normal.         Behavior: Behavior normal.         Acute Stroke Data    IV Thrombolytic (TPA/Tenecteplase) Inclusion / Exclusion Criteria    Time: 12:48 EST  Person Administering Scale: Jese Deutsch MD    Inclusion Criteria  [x]   18 years of age or greater   []   Onset of symptoms < 4.5 hours before beginning treatment (stroke onset = time patient was last seen well or without symptoms).   []   Diagnosis of acute ischemic stroke causing measurable disabling deficit (Complete Hemianopia, Any Aphasia, Visual or Sensory Extinction, Any weakness limiting sustained effort against gravity)   []   Any remaining deficit considered potentially disabling in view of patient and practitioner   Exclusion criteria (Do not proceed with Alteplase if any are checked under exclusion criteria)  []   Onset unknown or GREATER than 4.5 hours   []   ICH on CT/MRI   []   CT demonstrates hypodensity representing acute or subacute infarct   []   Significant head trauma or prior stroke in the previous 3 months   []   Symptoms suggestive of subarachnoid hemorrhage   []   History of un-ruptured intracranial aneurysm GREATER than 10 mm   []   Recent intracranial or intraspinal surgery within the last 3 months   []   Arterial puncture at a non-compressible site in the previous 7 days   []   Active internal bleeding   []   Acute bleeding tendency   []   Platelet count LESS than 100,000 for known hematological diseases such as leukemia, thrombocytopenia or chronic cirrhosis   []   Current use of anticoagulant with INR  GREATER than 1.7 or PT GREATER than 15 seconds, aPTT GREATER than 40 seconds   []   Heparin received within 48 hours, resulting in abnormally elevated aPTT GREATER than upper limit of normal   []   Current use of direct thrombin inhibitors or direct factor Xa inhibitors in the past 48 hours   []   Elevated blood pressure refractory to treatment (systolic GREATER than 185 mm/Hg or diastolic  GREATER than 110 mm/Hg   []   Suspected infective endocarditis and aortic arch dissection   []   Current use of therapeutic treatment dose of low-molecular-weight heparin (LMWH) within the previous 24 hours   []   Structural GI malignancy or bleed   Relative exclusion for all patients  []   Only minor nondisabling symptoms   []   Pregnancy   []   Seizure at onset with postictal residual neurological impairments   []   Major surgery or previous trauma within past 14 days   []   History of previous spontaneous ICH, intracranial neoplasm, or AV malformation   []   Postpartum (within previous 14 days)   []   Recent GI or urinary tract hemorrhage (within previous 21 days)   []   Recent acute MI (within previous 3 months)   []   History of unruptured intracranial aneurysm LESS than 10 mm   []   History of ruptured intracranial aneurysm   []   Blood glucose LESS than 50 mg/dL (2.7 mmol/L)   []   Dural puncture within the last 7 days   []   Known GREATER than 10 cerebral microbleeds   Additional exclusions for patients with symptoms onset between 3 and 4.5 hours.  []   Age > 80.   []   On any anticoagulants regardless of INR  >>> Warfarin (Coumadin), Heparin, Enoxaparin (Lovenox), fondaparinux (Arixtra), bivalirudin (Angiomax), Argatroban, dabigatran (Pradaxa), rivaroxaban (Xarelto), or apixaban (Eliquis)   []   Severe stroke (NIHSS > 25).   []   History of BOTH diabetes and previous ischemic stroke.   []   The risks and benefits have been discussed with the patient or family related to the administration of IV alteplase for stroke  symptoms.   []   I have discussed and reviewed the patient's case and imaging with the attending prior to IV Thrombolytic (TPA/Tenecteplase).    Time Thrombolytic administered   Symptoms resolved    Hospital Meds:  Scheduled- aspirin, 325 mg, Oral, Daily   Or  aspirin, 300 mg, Rectal, Daily  atenolol, 25 mg, Oral, BID  atorvastatin, 80 mg, Oral, Nightly  citalopram, 10 mg, Oral, Daily  [START ON 2023] furosemide, 20 mg, Oral, Daily  insulin lispro, 0-7 Units, Subcutaneous, TID AC  levothyroxine, 50 mcg, Oral, Daily  losartan, 50 mg, Oral, Daily  oxybutynin, 5 mg, Oral, TID  sodium chloride, 10 mL, Intravenous, Q12H      Infusions- sodium chloride, 100 mL/hr, Last Rate: 100 mL/hr (23)       PRNs- •  acetaminophen **OR** acetaminophen **OR** acetaminophen  •  dextrose  •  dextrose  •  glucagon (human recombinant)  •  HYDROcodone-acetaminophen  •  melatonin  •  ondansetron  •  [COMPLETED] Insert Peripheral IV **AND** sodium chloride  •  sodium chloride  •  sodium chloride    Functional Status Prior to Current Stroke/[place] Score: 0    NIH Stroke Scale  Time: 12:48 EST  Person Administering Scale: Jese Deutsch MD    1a  Level of consciousness: 0=alert; keenly responsive   1b. LOC questions:  0=Performs both tasks correctly   1c. LOC commands: 0=Performs both tasks correctly   2.  Best Gaze: 0=normal   3.  Visual: 0=No visual loss   4. Facial Palsy: 0=Normal symmetric movement   5a.  Motor left arm: 0=No drift, limb holds 90 (or 45) degrees for full 10 seconds   5b.  Motor right arm: 0=No drift, limb holds 90 (or 45) degrees for full 10 seconds   6a. motor left leg: 3=No effort against gravity, limb falls   6b  Motor right le=No drift, limb holds 90 (or 45) degrees for full 10 seconds   7. Limb Ataxia: 0=Absent   8.  Sensory: 0=Normal; no sensory loss   9. Best Language:  0=No aphasia, normal   10. Dysarthria: 0=Normal   11. Extinction and Inattention: 0=No abnormality    Total:   3       Results  Reviewed:  I have personally reviewed current lab, radiology, and data   MRI brain is negative for any acute finding, mild to moderate white matter disease, no hemorrhage  Labs were reviewed              Assessment/Plan:      1. Slurred speech.  Unclear etiology.  Her MRI brain is negative for any acute finding, given her risk factors she could have a small TIA.  Recommend her to be on aspirin 325 mg and continue home dose of simvastatin 40 mg daily.  Recommend CT angiogram of head and neck and 2D echocardiogram, which if looks negative, she can be discharged back to her facility.  2. Essential hypertension.  Normal blood pressure goals.  No need for permissive hypertension.  3. Diabetes mellitus type 2 uncontrolled with hyperglycemia.  A1c is 8.4, goal of less than 7.  4. Dyslipidemia.  Relatively well controlled.  Continue home dose of statins.  5. Increase activity as tolerated.    Case was discussed with patient, her family, nursing and will talk to the primary team.  Thank you for the consult.          Jese Deutsch MD  January 22, 2023  12:48 EST

## 2023-01-22 NOTE — PROGRESS NOTES
[facility] Acute Inpt Rehab Note     Referral received via stroke order set.  Please note this is a screening only, rehab admissions will not actively be evaluating this patient.  If felt patient is appropriate for our services once therapies begin, please call our office at [phone], to initiate a full referral.    Thank you,   Юлия Rossi RN   Rehab Admission Nurse

## 2023-01-22 NOTE — THERAPY EVALUATION
Acute Care - Speech Language Pathology   Swallow Initial Evaluation [facility]     Patient Name: Alice Parisi  : 1940  MRN: [id number]  Today's Date: 2023               Admit Date: 2023    Visit Dx:     ICD-10-CM ICD-9-CM   1. TIA (transient ischemic attack)  G45.9 435.9     Patient Active Problem List   Diagnosis   • DM2 (diabetes mellitus, type 2) (HCC)   • Generalized OA   • HTN (hypertension), benign   • Hypothyroid   • Impaired mobility   • Obesity   • Post-polio syndrome   • Hyperlipidemia   • Depression   • OAB (overactive bladder)   • Foot pain, left   • History of gout   • Dependent edema   • TIA (transient ischemic attack)     Past Medical History:   Diagnosis Date   • Arthritis    • Depression    • Diabetes mellitus (HCC)    • Hyperlipidemia    • Hypertension    • Incontinence    • Insomnia    • Obesity    • Postpolio syndrome    • Sleep apnea    • UTI (urinary tract infection)      Past Surgical History:   Procedure Laterality Date   • APPENDECTOMY     • CATARACT EXTRACTION     • EYELID LACERATION REPAIR     • GALLBLADDER SURGERY     • HYSTERECTOMY     • KNEE SURGERY     • LUMBAR DISC SURGERY       Patient was not wearing a face mask during this therapy encounter. Therapist used appropriate personal protective equipment including mask, eye protection and gloves.  Mask used was standard procedure mask. Appropriate PPE was worn during the entire therapy session. Hand hygiene was completed before and after therapy session. Patient is not in enhanced droplet precautions.             SLP Recommendation and Plan  SLP Swallowing Diagnosis: R/O pharyngeal dysphagia (23 1400)  SLP Diet Recommendation: regular textures, thin liquids (23 1400)  Recommended Precautions and Strategies: upright posture during/after eating, small bites of food and sips of liquid, general aspiration precautions (23 1400)  SLP Rec. for Method of Medication Administration: as tolerated (23  1400)     Monitor for Signs of Aspiration: yes, notify SLP if any concerns (01/22/23 1400)  Recommended Diagnostics: VFSS (MBS), FEES, SLE/Cog/Motor Speech Evaluation (monitor need for a VFSS/FEES and a full speech language cognitive linguistic evaluation) (01/22/23 1400)  Swallow Criteria for Skilled Therapeutic Interventions Met: demonstrates skilled criteria (01/22/23 1400)     Rehab Potential/Prognosis, Swallowing: good, to achieve stated therapy goals (01/22/23 1400)  Therapy Frequency (Swallow): PRN (01/22/23 1400)  Predicted Duration Therapy Intervention (Days): until discharge (01/22/23 1400)                                        Plan of Care Reviewed With: patient, friend  Outcome Evaluation: Mastication functional when indicated with functional bolus consolidaiton and transfer.  No significant oral residuals noted after the swallow.  The pharyngeal swallow reflex felt timely upon palpation with adequate laryngeal elevation.  No overt s/s of aspiration noted.  Recommend a regular texture diet with thin liquids and meds as tolerated.  Swallow precautions include sit upright, small bites and sips, and okay to use straws.  Will monitor diet tolerance and for the need for a VFSS/FEES as well as the need for a full speech language swallow evaluation.  Thank you for this consult.      SWALLOW EVALUATION (last 72 hours)     SLP Adult Swallow Evaluation     Row Name 01/22/23 1400                   Rehab Evaluation    Document Type evaluation  -NR        Subjective Information no complaints  -NR        Patient Observations alert;cooperative;agree to therapy  -NR        Patient Effort excellent  -NR        Symptoms Noted During/After Treatment none  -NR           General Information    Patient Profile Reviewed yes  -NR        Pertinent History Of Current Problem Pt sitting upright in a chair visiting with the nursing home administrator and his wife who came to visit the patient.  Pt visiting very appropriately with  "her company often laughing and telling jokes.  Visitors denied any acute oromotor weakness or facial lag.  They stated her speech intelligibility was slightly reduced but may be due to dry mouth.  Pt stated \"I have cotton mouth!\"  Pt states she has Biotine at the facility and eats a lot of ice chips.  Pt admitted for CVA vs TIA.  Pt failed nursing swallow screen due to dysarthria.  MRI negative.  PMH noted for HTN, DM2, hypothyroidism and OAB.  -NR        Current Method of Nutrition NPO  -NR        Precautions/Limitations, Vision WFL with corrective lenses;for purposes of eval  -NR        Precautions/Limitations, Hearing hearing impairment, bilaterally  -NR        Prior Level of Function-Communication WFL  -NR        Prior Level of Function-Swallowing safe, efficient swallowing in all situations  -NR        Plans/Goals Discussed with patient;agreed upon  -NR        Barriers to Rehab none identified  -NR        Patient's Goals for Discharge return to PO diet  -NR           Pain Scale: Numbers Pre/Post-Treatment    Pretreatment Pain Rating 0/10 - no pain  -NR        Posttreatment Pain Rating 0/10 - no pain  -NR           Oral Motor Structure and Function    Dentition Assessment natural, present and adequate  -NR        Secretion Management WNL/WFL  -NR        Mucosal Quality moist, healthy  -NR        Volitional Swallow WFL  -NR           Oral Musculature and Cranial Nerve Assessment    Oral Motor General Assessment generalized oral motor weakness  visitors denied acute facial lag or acute oromotor weakness  -NR           General Eating/Swallowing Observations    Eating/Swallowing Skills self-fed;fed by SLP  -NR        Positioning During Eating upright in chair  -NR        Utensils Used spoon;cup;straw  -NR        Consistencies Trialed regular textures;chopped;soft to chew textures;mixed consistency;pureed;ice chips;thin liquids;nectar/syrup-thick liquids  -NR           Respiratory    Respiratory Status WFL  -NR     "       Clinical Swallow Eval    Oral Prep Phase WFL  -NR        Oral Transit WFL  -NR        Oral Residue WFL  -NR        Pharyngeal Phase no overt signs/symptoms of pharyngeal impairment  -NR        Esophageal Phase unremarkable  -NR        Clinical Swallow Evaluation Summary Mastication functional when indicated with functional bolus consolidaiton and transfer.  No significant oral residuals noted after the swallow.  The pharyngeal swallow reflex felt timely upon palpation with adequate laryngeal elevation.  No overt s/s of aspiration noted.  Recommend a regular texture diet with thin liquids and meds as tolerated.  Swallow precautions include sit upright, small bites and sips, and okay to use straws.  Will monitor diet tolerance and for the need for a VFSS/FEES as well as the need for a full speech language swallow evaluation.  Thank you for this consult.  -NR           SLP Evaluation Clinical Impression    SLP Swallowing Diagnosis R/O pharyngeal dysphagia  -NR        Functional Impact risk of aspiration/pneumonia  -NR        Rehab Potential/Prognosis, Swallowing good, to achieve stated therapy goals  -NR        Swallow Criteria for Skilled Therapeutic Interventions Met demonstrates skilled criteria  -NR           SLP Treatment Clinical Impressions    Care Plan Review evaluation/treatment results reviewed;care plan/treatment goals reviewed;risks/benefits reviewed;current/potential barriers reviewed;patient/other agree to care plan  -NR           Recommendations    Therapy Frequency (Swallow) PRN  -NR        Predicted Duration Therapy Intervention (Days) until discharge  -NR        SLP Diet Recommendation regular textures;thin liquids  -NR        Recommended Diagnostics VFSS (MBS);FEES;SLE/Cog/Motor Speech Evaluation  monitor need for a VFSS/FEES and a full speech language cognitive linguistic evaluation  -NR        Recommended Precautions and Strategies upright posture during/after eating;small bites of food and  sips of liquid;general aspiration precautions  -NR        Oral Care Recommendations Oral Care before breakfast, after meals and PRN  -NR        SLP Rec. for Method of Medication Administration as tolerated  -NR        Monitor for Signs of Aspiration yes;notify SLP if any concerns  -NR           Swallow Goals (SLP)    Swallow LTGs Patient will demonstrate functional swallow for  -NR        Swallow STGs --  Monitor the need for a VFSS/FEES and a full speech language cognitive evaluation and tx  -NR           (LTG) Patient will demonstrate functional swallow for    Diet Texture (Demonstrate functional swallow) regular textures  -NR        Liquid viscosity (Demonstrate functional swallow) thin liquids  -NR        [place] (Demonstrate functional swallow) independently (over 90% accuracy)  -NR        Time Frame (Demonstrate functional swallow) by discharge  -NR              User Key  (r) = Recorded By, (t) = Taken By, (c) = Cosigned By    Initials Name Effective Dates    NR Bethanie Stout MA,CCC-SLP 06/16/21 -                 EDUCATION  The patient has been educated in the following areas:   Dysphagia (Swallowing Impairment).        SLP GOALS     Row Name 01/22/23 1400             (LTG) Patient will demonstrate functional swallow for    Diet Texture (Demonstrate functional swallow) regular textures  -NR      Liquid viscosity (Demonstrate functional swallow) thin liquids  -NR      [place] (Demonstrate functional swallow) independently (over 90% accuracy)  -NR      Time Frame (Demonstrate functional swallow) by discharge  -NR            User Key  (r) = Recorded By, (t) = Taken By, (c) = Cosigned By    Initials Name Provider Type    NR Bethanie Stout MA,CCC-SLP Speech and Language Pathologist              SLP Outcome Measures (last 72 hours)     SLP Outcome Measures     Row Name 01/22/23 1400             SLP Outcome Measures    Outcome Measure Used? Adult NOMS  -NR         Adult FCM Scores    FCM Chosen Swallowing   -NR      Swallowing FCM Score 6  -NR            User Key  (r) = Recorded By, (t) = Taken By, (c) = Cosigned By    Initials Name Effective Dates    NR Bethanie Stout MA,CCC-SLP 06/16/21 -                  Time Calculation:    Time Calculation- SLP     Row Name 01/22/23 1420             Time Calculation- SLP    SLP Start Time 1330  -NR      SLP Stop Time 1430  -NR      SLP Time Calculation (min) 60 min  -NR            User Key  (r) = Recorded By, (t) = Taken By, (c) = Cosigned By    Initials Name Provider Type    NR Bethanie Stout MA,CCC-SLP Speech and Language Pathologist                Therapy Charges for Today     Code Description Service Date Service Provider Modifiers Qty    [id number] HC ST EVAL ORAL PHARYNG SWALLOW 4 1/22/2023 Bethanie Stout MA,CCC-SLP GN 1               Bethanie Stout MA,CCC-SLP  1/22/2023

## 2023-01-22 NOTE — PLAN OF CARE
Goal Outcome Evaluation:  Plan of Care Reviewed With: patient           Outcome Evaluation: Pt is a 82 y.o female admitted with slurred speech, word finding difficulty. She reports she lives in a nursing home and does have some assist with ADLs but is able to transfer self independently at baseline. She is non ambulatory and uses a w/c. She has hx of polio as a child and does not have strength in her LLE. Today she is evaluated by OT, oriented X4 but difficulty with speech noted. Work up for TIA ongoing, OK for activity per neurology. She is able to squat pivot to chair min-mod A. And needs mod A to don socks. She appears weaker than her stated baseline. She would benefit from continued OT to address deficits and increase her independence with ADLs and transfers. Anticipate dc back to facility with ongoing therapy as needed.    OT wore appropriate PPE and completed hand hygiene.

## 2023-01-22 NOTE — THERAPY EVALUATION
"Patient Name: Alice Parisi  : 1940    MRN: [id number]                              Today's Date: 2023       Admit Date: 2023    Visit Dx:     ICD-10-CM ICD-9-CM   1. TIA (transient ischemic attack)  G45.9 435.9     Patient Active Problem List   Diagnosis   • DM2 (diabetes mellitus, type 2) (HCC)   • Generalized OA   • HTN (hypertension), benign   • Hypothyroid   • Impaired mobility   • Obesity   • Post-polio syndrome   • Hyperlipidemia   • Depression   • OAB (overactive bladder)   • Foot pain, left   • History of gout   • Dependent edema   • TIA (transient ischemic attack)     Past Medical History:   Diagnosis Date   • Arthritis    • Depression    • Diabetes mellitus (HCC)    • Hyperlipidemia    • Hypertension    • Incontinence    • Insomnia    • Obesity    • Postpolio syndrome    • Sleep apnea    • UTI (urinary tract infection)      Past Surgical History:   Procedure Laterality Date   • APPENDECTOMY     • CATARACT EXTRACTION     • EYELID LACERATION REPAIR     • GALLBLADDER SURGERY     • HYSTERECTOMY     • KNEE SURGERY     • LUMBAR DISC SURGERY        General Information     Row Name 23 1311          OT Time and Intention    Document Type evaluation  -     Mode of Treatment occupational therapy;individual therapy  -     Row Name 23 1311          General Information    Patient Profile Reviewed yes  -SM     Prior Level of Function --  pt reports she can independently transfer self to her w/c, she has difficulty with incontinence at baseline limiting toileting, \"someone is always there when I am in the shower room\". \"I can get myself dressed but sometimes they help me after a shower\"  -     Existing Precautions/Restrictions fall  -     Row Name 23 1311          Living Environment    People in Home facility resident  -     Row Name 23 1311          Cognition    Orientation Status (Cognition) oriented x 4  speech is unclear at times  -     Row Name 23 1311       " "   Safety Issues, Functional Mobility    Impairments Affecting Function (Mobility) balance;endurance/activity tolerance;strength;muscle tone abnormal  -           User Key  (r) = Recorded By, (t) = Taken By, (c) = Cosigned By    Initials Name Provider Type    Jaelyn Rodriguez OT Occupational Therapist                 Mobility/ADL's     Row Name 01/22/23 1313          Bed Mobility    Supine-Sit [place] (Bed Mobility) contact guard  -     Assistive Device (Bed Mobility) bed rails;head of bed elevated  -     Row Name 01/22/23 1313          Transfers    Transfers bed-chair transfer  -     Row Name 01/22/23 1313          Bed-Chair Transfer    Bed-Chair [place] (Transfers) moderate assist (50% patient effort);minimum assist (75% patient effort)  -     Comment, (Bed-Chair Transfer) squat pivot transfer  -     Row Name 01/22/23 1313          Activities of Daily Living    BADL Assessment/Intervention lower body dressing;toileting  -     Row Name 01/22/23 1313          Lower Body Dressing Assessment/Training    [place] Level (Lower Body Dressing) don;socks;moderate assist (50% patient effort)  -     Position (Lower Body Dressing) edge of bed sitting  -     Row Name 01/22/23 1313          Toileting Assessment/Training    [place] Level (Toileting) dependent (less than 25% patient effort)  -     Comment, (Toileting) voids via purewick while standing. Pt reports \"I pee everytime I stand\" at baseline.  -           User Key  (r) = Recorded By, (t) = Taken By, (c) = Cosigned By    Initials Name Provider Type    Jaelyn Rodriguez OT Occupational Therapist               Obj/Interventions     Row Name 01/22/23 1314          Sensory Assessment (Somatosensory)    Sensory Assessment (Somatosensory) UE sensation intact  -     Row Name 01/22/23 1314          Range of Motion Comprehensive    General Range of Motion bilateral upper extremity ROM WFL  -     Row Name 01/22/23 1314          " Strength Comprehensive (MMT)    Comment, General Manual Muscle Testing (MMT) Assessment BUE strength 3+/5  -SM     Row Name 01/22/23 1314          Balance    Balance Assessment sitting static balance;standing static balance;standing dynamic balance  -SM     Static Sitting Balance supervision  -SM     Position, Sitting Balance sitting edge of bed  -SM     Static Standing Balance minimal assist  -SM     Dynamic Standing Balance minimal assist;moderate assist  -SM     Comment, Balance pt doesnt fully stand but does clear bottom from bed/chair with assist  -SM           User Key  (r) = Recorded By, (t) = Taken By, (c) = Cosigned By    Initials Name Provider Type    SM Jaelyn Robertson OT Occupational Therapist               Goals/Plan     Row Name 01/22/23 1320          Bed Mobility Goal 1 (OT)    Activity/Assistive Device (Bed Mobility Goal 1, OT) bed mobility activities, all  -SM     [place] Level/Cues Needed (Bed Mobility Goal 1, OT) standby assist  -SM     Time Frame (Bed Mobility Goal 1, OT) short term goal (STG);2 weeks  -SM     Progress/Outcomes (Bed Mobility Goal 1, OT) goal ongoing  -     Row Name 01/22/23 1320          Transfer Goal 1 (OT)    Activity/Assistive Device (Transfer Goal 1, OT) bed-to-chair/chair-to-bed;commode, bedside without drop arms  -SM     [place] Level/Cues Needed (Transfer Goal 1, OT) standby assist  -SM     Time Frame (Transfer Goal 1, OT) short term goal (STG);2 weeks  -SM     Progress/Outcome (Transfer Goal 1, OT) goal ongoing  -     Row Name 01/22/23 1320          Dressing Goal 1 (OT)    Activity/Device (Dressing Goal 1, OT) lower body dressing  -SM     [place]/Cues Needed (Dressing Goal 1, OT) standby assist  -SM     Time Frame (Dressing Goal 1, OT) short term goal (STG);2 weeks  -SM     Progress/Outcome (Dressing Goal 1, OT) goal ongoing  -     Row Name 01/22/23 1320          Therapy Assessment/Plan (OT)    Planned Therapy Interventions (OT) activity tolerance  training;adaptive equipment training;BADL retraining;functional balance retraining;occupation/activity based interventions;patient/caregiver education/training;transfer/mobility retraining;strengthening exercise  -           User Key  (r) = Recorded By, (t) = Taken By, (c) = Cosigned By    Initials Name Provider Type    Jaelyn Rodriguez, NIKKI Occupational Therapist               Clinical Impression     Row Name 01/22/23 1316          Pain Assessment    Pretreatment Pain Rating 0/10 - no pain  -SM     Posttreatment Pain Rating 0/10 - no pain  -     Row Name 01/22/23 1316          Plan of Care Review    Plan of Care Reviewed With patient  -     Outcome Evaluation Pt is a 82 y.o female admitted with slurred speech, word finding difficulty. She reports she lives in a nursing home and does have some assist with ADLs but is able to transfer self independently at baseline. She is non ambulatory and uses a w/c. She has hx of polio as a child and does not have strength in her LLE. Today she is evaluated by OT, oriented X4 but difficulty with speech noted. Work up for TIA ongoing, OK for activity per neurology. She is able to squat pivot to chair min-mod A. And needs mod A to don socks. She appears weaker than her stated baseline. She would benefit from continued OT to address deficits and increase her independence with ADLs and transfers. Anticipate dc back to facility with ongoing therapy as needed.  -     Row Name 01/22/23 1316          Therapy Assessment/Plan (OT)    Rehab Potential (OT) good, to achieve stated therapy goals  -     Criteria for Skilled Therapeutic Interventions Met (OT) yes;skilled treatment is necessary  -     Therapy Frequency (OT) 3 times/wk  -     Row Name 01/22/23 1316          Therapy Plan Review/Discharge Plan (OT)    Anticipated Discharge Disposition (OT) skilled nursing facility  -     Row Name 01/22/23 1316          Positioning and Restraints    Pre-Treatment Position in bed   -     Post Treatment Position chair  -SM     In Chair reclined;call light within reach;encouraged to call for assist;exit alarm on;notified nsg  -           User Key  (r) = Recorded By, (t) = Taken By, (c) = Cosigned By    Initials Name Provider Type    Jaelyn Rodriguez, OT Occupational Therapist               Outcome Measures     Row Name 01/22/23 1322          How much help from another is currently needed...    Putting on and taking off regular lower body clothing? 2  -SM     Bathing (including washing, rinsing, and drying) 2  -SM     Toileting (which includes using toilet bed pan or urinal) 1  -SM     Putting on and taking off regular upper body clothing 3  -SM     Taking care of personal grooming (such as brushing teeth) 3  -SM     Eating meals 1  NPO until cleared by ST, anticipate then no deficits if cleared for diet  -SM     AM-PAC 6 Clicks Score (OT) 12  -     Row Name 01/22/23 1042          How much help from another person do you currently need...    Turning from your back to your side while in flat bed without using bedrails? 2  -CS     Moving from lying on back to sitting on the side of a flat bed without bedrails? 2  -CS     Moving to and from a bed to a chair (including a wheelchair)? 2  -CS     Standing up from a chair using your arms (e.g., wheelchair, bedside chair)? 2  -CS     Climbing 3-5 steps with a railing? 1  -CS     To walk in hospital room? 1  -CS     AM-PAC 6 Clicks Score (PT) 10  -CS     Highest level of mobility 4 --> Transferred to chair/commode  -     Row Name 01/22/23 1322          Modified Meng Scale    Modified [place] Scale 4 - Moderately severe disability.  Unable to walk without assistance, and unable to attend to own bodily needs without assistance.  -     Row Name 01/22/23 1322 01/22/23 1042       Functional Assessment    Outcome Measure Options AM-PAC 6 Clicks Daily Activity (OT);Modified [place]  - AM-PAC 6 Clicks Basic Mobility (PT)  -CS          User Key   (r) = Recorded By, (t) = Taken By, (c) = Cosigned By    Initials Name Provider Type    CS Jacob Duran, PT Physical Therapist    Jaelyn Rodriguez, OT Occupational Therapist                Occupational Therapy Education     Title: PT OT SLP Therapies (In Progress)     Topic: Occupational Therapy (In Progress)     Point: ADL training (Done)     Description:   Instruct learner(s) on proper safety adaptation and remediation techniques during self care or transfers.   Instruct in proper use of assistive devices.              Learning Progress Summary           Patient Acceptance, E, VU by  at 1/22/2023 1323    Comment: OT goals/POC, safety with transfers, discussed safe technique with [facility] [place] to get her back to bed and placed chair where she can transfer towards her strong/R side.                   Point: Home exercise program (Not Started)     Description:   Instruct learner(s) on appropriate technique for monitoring, assisting and/or progressing therapeutic exercises/activities.              Learner Progress:  Not documented in this visit.          Point: Precautions (Not Started)     Description:   Instruct learner(s) on prescribed precautions during self-care and functional transfers.              Learner Progress:  Not documented in this visit.          Point: Body mechanics (Not Started)     Description:   Instruct learner(s) on proper positioning and spine alignment during self-care, functional mobility activities and/or exercises.              Learner Progress:  Not documented in this visit.                      User Key     Initials Effective Dates Name Provider Type Discipline     04/02/20 -  Jaelyn Robertson OT Occupational Therapist OT              OT Recommendation and Plan  Planned Therapy Interventions (OT): activity tolerance training, adaptive equipment training, BADL retraining, functional balance retraining, occupation/activity based interventions, patient/caregiver education/training,  transfer/mobility retraining, strengthening exercise  Therapy Frequency (OT): 3 times/wk  Plan of Care Review  Plan of Care Reviewed With: patient  Outcome Evaluation: Pt is a 82 y.o female admitted with slurred speech, word finding difficulty. She reports she lives in a nursing home and does have some assist with ADLs but is able to transfer self independently at baseline. She is non ambulatory and uses a w/c. She has hx of polio as a child and does not have strength in her LLE. Today she is evaluated by OT, oriented X4 but difficulty with speech noted. Work up for TIA ongoing, OK for activity per neurology. She is able to squat pivot to chair min-mod A. And needs mod A to don socks. She appears weaker than her stated baseline. She would benefit from continued OT to address deficits and increase her independence with ADLs and transfers. Anticipate dc back to facility with ongoing therapy as needed.     Time Calculation:    Time Calculation- OT     Row Name 01/22/23 1324             Time Calculation- OT    OT Start Time 1026  -SM      OT Stop Time 1049  -SM      OT Time Calculation (min) 23 min  -SM      Total Timed Code Minutes- OT 15 minute(s)  -SM      OT Received On 01/22/23  -      OT - Next Appointment 01/23/23  -      OT Goal Re-Cert Due Date 02/05/23  -         Timed Charges    51586 - OT Therapeutic Activity Minutes 15  -SM         Untimed Charges    OT Eval/Re-eval Minutes 8  -SM         Total Minutes    Timed Charges Total Minutes 15  -SM      Untimed Charges Total Minutes 8  -SM       Total Minutes 23  -SM            User Key  (r) = Recorded By, (t) = Taken By, (c) = Cosigned By    Initials Name Provider Type     Jaelyn Robertson OT Occupational Therapist              Therapy Charges for Today     Code Description Service Date Service Provider Modifiers Qty    [id number]  OT EVAL MOD COMPLEXITY 2 1/22/2023 Jaelyn Robertson OT GO 1    [id number]  OT THERAPEUTIC ACT EA 15 MIN 1/22/2023  Jaelyn Robertson, OT GO 1               Jaelyn Robertson, OT  1/22/2023

## 2023-01-22 NOTE — ED NOTES
"[facility]  82 [facility]  [facility]    HPI :   Chief Complaint   Patient presents with    Speech Problem       Admitting doctor:   Greg Knight MD    Admitting diagnosis:   The encounter diagnosis was TIA (transient ischemic attack).    Code status:   Current Code Status       Date Active Code Status Order ID Comments User Context       1/22/2023 0233 CPR (Attempt to Resuscitate) [id number]  Manuela Morrow, [facility]        Question Answer    Code Status (Patient has no pulse and is not breathing) CPR (Attempt to Resuscitate)    Medical Interventions (Patient has pulse or is breathing) Full Support                    Allergies:   Patient has no known allergies.    Isolation:   No active isolations    Intake and Output  No intake or output data in the 24 hours ending 01/22/23 0316    Weight:       01/21/23 2207   Weight: 104 kg (230 lb)       Most recent vitals:   Vitals:    01/21/23 2206 01/21/23 2207   BP: 141/73    BP Location: Right arm    Patient Position: Lying    Pulse: 64    Resp: 16    Temp: 97.6 °F (36.4 °C)    TempSrc: Oral    SpO2: 98%    Weight:  104 kg (230 lb)   Height:  160 cm (63\")       Active LDAs/IV Access:   Lines, Drains & Airways       Active LDAs       Name Placement date Placement time Site Days    Peripheral IV 01/22/23 0146 Right Antecubital 01/22/23 0146  Antecubital  less than 1                    Labs (abnormal labs have a star):   Labs Reviewed   COMPREHENSIVE METABOLIC PANEL - Abnormal; Notable for the following components:       Result Value    Glucose 199 (*)     BUN/Creatinine Ratio 29.0 (*)     All other components within normal limits    Narrative:     GFR Normal >60  Chronic Kidney Disease <60  Kidney Failure <15    The GFR formula is only valid for adults with stable renal function between ages 18 and 70.   CBC WITH AUTO DIFFERENTIAL - Abnormal; Notable for the following components:    RDW 11.9 (*)     All other components within normal limits "   URINALYSIS W/ MICROSCOPIC IF INDICATED (NO CULTURE) - Abnormal; Notable for the following components:    Leuk Esterase, UA Trace (*)     All other components within normal limits   URINALYSIS, MICROSCOPIC ONLY - Abnormal; Notable for the following components:    Bacteria, UA 3+ (*)     All other components within normal limits   PROTIME-INR - Normal   APTT - Normal   TROPONIN (IN-HOUSE) - Normal    Narrative:     Troponin T Reference Range:  <= 0.03 ng/mL-   Negative for AMI  >0.03 ng/mL-     Abnormal for myocardial necrosis.  Clinicians would have to utilize clinical acumen, EKG, Troponin and serial changes to determine if it is an Acute Myocardial Infarction or myocardial injury due to an underlying chronic condition.       Results may be falsely decreased if patient taking Biotin.     TROPONIN (IN-HOUSE) - Normal    Narrative:     Troponin T Reference Range:  <= 0.03 ng/mL-   Negative for AMI  >0.03 ng/mL-     Abnormal for myocardial necrosis.  Clinicians would have to utilize clinical acumen, EKG, Troponin and serial changes to determine if it is an Acute Myocardial Infarction or myocardial injury due to an underlying chronic condition.       Results may be falsely decreased if patient taking Biotin.     HEMOGLOBIN A1C   LIPID PANEL   BASIC METABOLIC PANEL   CBC WITH AUTO DIFFERENTIAL   POCT GLUCOSE FINGERSTICK   POCT GLUCOSE FINGERSTICK   POCT GLUCOSE FINGERSTICK   POCT GLUCOSE FINGERSTICK   POCT GLUCOSE FINGERSTICK   POCT GLUCOSE FINGERSTICK   POCT GLUCOSE FINGERSTICK   CBC AND DIFFERENTIAL    Narrative:     The following orders were created for panel order CBC & Differential.  Procedure                               Abnormality         Status                     ---------                               -----------         ------                     CBC Auto Differential[[id number]]        Abnormal            Final result                 Please view results for these tests on the individual orders.       EKG:    ECG 12 Lead Stroke Evaluation   Preliminary Result   HEART RATE= 77  bpm   RR Interval= 779  ms   GA Interval= 137  ms   P Horizontal Axis= -23  deg   P Front Axis= 79  deg   QRSD Interval= 141  ms   QT Interval= 416  ms   QRS Axis= 9  deg   T Wave Axis= 175  deg   - ABNORMAL ECG -   Sinus rhythm   IVCD, consider atypical LBBB   Baseline wander in lead(s) V1   Electronically Signed By:    Date and Time of Study: 2023-01-22 00:12:56          Meds given in ED:   Medications   sodium chloride 0.9 % flush 10 mL (has no administration in time range)   sodium chloride 0.9 % flush 10 mL (has no administration in time range)   sodium chloride 0.9 % flush 10 mL (has no administration in time range)   sodium chloride 0.9 % infusion 40 mL (has no administration in time range)   atorvastatin (LIPITOR) tablet 80 mg (has no administration in time range)   dextrose (GLUTOSE) oral gel 15 g (has no administration in time range)   dextrose (D50W) (25 g/50 mL) IV injection 25 g (has no administration in time range)   glucagon (human recombinant) (GLUCAGEN DIAGNOSTIC) injection 1 mg (has no administration in time range)   sodium chloride 0.9 % infusion (has no administration in time range)   acetaminophen (TYLENOL) tablet 650 mg (has no administration in time range)     Or   acetaminophen (TYLENOL) 160 MG/5ML solution 650 mg (has no administration in time range)     Or   acetaminophen (TYLENOL) suppository 650 mg (has no administration in time range)   insulin lispro (ADMELOG) injection 0-7 Units (has no administration in time range)   ondansetron (ZOFRAN) injection 4 mg (has no administration in time range)   aspirin tablet 325 mg (has no administration in time range)     Or   aspirin suppository 300 mg (has no administration in time range)       Imaging results:  CT Head Without Contrast    Result Date: 1/22/2023  No acute intracranial findings.  Radiation dose reduction techniques were utilized, including automated exposure  control and exposure modulation based on body size.  This report was finalized on 1/22/2023 12:47 AM by Dr. Obdulia Hernandez M.D.       Ambulatory status:   - Assist X2, Wheelchair usually    Social issues:   Social History     Socioeconomic History    Marital status:    Tobacco Use    Smoking status: Never    Smokeless tobacco: Never   Substance and Sexual Activity    Alcohol use: No    Drug use: Defer    Sexual activity: Defer       NIH Stroke Scale:         Yuli Astorga RN  01/22/23 03:16 EST

## 2023-01-22 NOTE — ED PROVIDER NOTES
EMERGENCY DEPARTMENT ENCOUNTER    Room Number:  S515/1  Date of encounter:  1/22/2023  PCP: Geronimo Fischer MD  Patient Care Team:  Geronimo Fischer MD as PCP - General (Internal Medicine)   Independent Historians: Patient    HPI:  Chief Complaint: Word finding difficulty, slurred speech  A complete HPI/ROS/PMH/PSH/SH/FH are unobtainable due to: None    Chronic or social conditions impacting patient care (social determinants of health): Patient is very hard of hearing    Context: Alice Parisi is a 82 y.o. female who presents to the ED c/o word finding difficulty and slurred speech around 9 PM.  States that her symptoms have gradually improved.  Patient states that after she took her medication IVF she began to feel like she was drunk.    Review of prior external notes (non-ED): I reviewed facility discharge summary from 1/20/2021    Review of prior external test results outside of this encounter: I have reviewed patient's CMP from 11/7/2019, and CBC from 1/3/2021    PAST MEDICAL HISTORY  Active Ambulatory Problems     Diagnosis Date Noted   • DM2 (diabetes mellitus, type 2) (HCC) 06/29/2015   • Generalized OA 06/29/2015   • HTN (hypertension), benign 11/05/2015   • Hypothyroid 06/29/2015   • Impaired mobility 06/29/2015   • Obesity 06/29/2015   • Post-polio syndrome 06/29/2015   • Hyperlipidemia 02/08/2017   • Depression 10/08/2015   • OAB (overactive bladder) 06/29/2015   • Foot pain, left 03/07/2018   • History of gout 03/07/2018   • Dependent edema 05/23/2018     Resolved Ambulatory Problems     Diagnosis Date Noted   • No Resolved Ambulatory Problems     Past Medical History:   Diagnosis Date   • Arthritis    • Diabetes mellitus (HCC)    • Hypertension    • Incontinence    • Insomnia    • Postpolio syndrome    • Sleep apnea    • UTI (urinary tract infection)        The patient qualifies to receive the vaccine, but they have not yet received it.    PAST SURGICAL HISTORY  Past Surgical History:   Procedure  Laterality Date   • APPENDECTOMY     • CATARACT EXTRACTION     • EYELID LACERATION REPAIR     • GALLBLADDER SURGERY     • HYSTERECTOMY     • KNEE SURGERY     • LUMBAR DISC SURGERY           FAMILY HISTORY  Family History   Problem Relation Age of Onset   • Diabetes Mother    • Diabetes Brother    • Heart disease Brother          SOCIAL HISTORY  Social History     Socioeconomic History   • Marital status:    Tobacco Use   • Smoking status: Never   • Smokeless tobacco: Never   Vaping Use   • Vaping Use: Never used   Substance and Sexual Activity   • Alcohol use: No   • Drug use: Never   • Sexual activity: Not Currently         ALLERGIES  Shellfish-derived products        REVIEW OF SYSTEMS  Review of Systems     All systems reviewed and negative except for those discussed in HPI.       PHYSICAL EXAM    I have reviewed the triage vital signs and nursing notes.    ED Triage Vitals [01/21/23 2206]   Temp Heart Rate Resp BP SpO2   97.6 °F (36.4 °C) 64 16 141/73 98 %      Temp src Heart Rate Source Patient Position BP Location FiO2 (%)   Oral Monitor Lying Right arm --       Physical Exam  GENERAL: alert, no acute distress  SKIN: Warm, dry  HENT: Normocephalic, atraumatic  EYES: no scleral icterus  CV: regular rhythm, regular rate  RESPIRATORY: normal effort, lungs clear  ABDOMEN: soft, nontender, nondistended  MUSCULOSKELETAL: no deformity  NEURO: alert, moves all extremities, follows commands          LAB RESULTS  Recent Results (from the past 24 hour(s))   ECG 12 Lead Stroke Evaluation    Collection Time: 01/22/23 12:12 AM   Result Value Ref Range    QT Interval 416 ms   Comprehensive Metabolic Panel    Collection Time: 01/22/23 12:24 AM    Specimen: Arm, Right; Blood   Result Value Ref Range    Glucose 199 (H) 65 - 99 mg/dL    BUN 18 8 - 23 mg/dL    Creatinine 0.62 0.57 - 1.00 mg/dL    Sodium 139 136 - 145 mmol/L    Potassium 4.0 3.5 - 5.2 mmol/L    Chloride 102 98 - 107 mmol/L    CO2 26.8 22.0 - 29.0 mmol/L     Calcium 10.1 8.6 - 10.5 mg/dL    Total Protein 6.8 6.0 - 8.5 g/dL    Albumin 3.8 3.5 - 5.2 g/dL    ALT (SGPT) 25 1 - 33 U/L    AST (SGOT) 22 1 - 32 U/L    Alkaline Phosphatase 83 39 - 117 U/L    Total Bilirubin 0.3 0.0 - 1.2 mg/dL    Globulin 3.0 gm/dL    A/G Ratio 1.3 g/dL    BUN/Creatinine Ratio 29.0 (H) 7.0 - 25.0    Anion Gap 10.2 5.0 - 15.0 mmol/L    eGFR 89.0 >60.0 mL/min/1.73   Protime-INR    Collection Time: 01/22/23 12:24 AM    Specimen: Arm, Right; Blood   Result Value Ref Range    Protime 13.4 11.7 - 14.2 Seconds    INR 1.01 0.90 - 1.10   aPTT    Collection Time: 01/22/23 12:24 AM    Specimen: Arm, Right; Blood   Result Value Ref Range    PTT 29.7 22.7 - 35.4 seconds   Troponin    Collection Time: 01/22/23 12:24 AM    Specimen: Arm, Right; Blood   Result Value Ref Range    Troponin T <0.010 0.000 - 0.030 ng/mL   CBC Auto Differential    Collection Time: 01/22/23 12:24 AM    Specimen: Arm, Right; Blood   Result Value Ref Range    WBC 10.26 3.40 - 10.80 10*3/mm3    RBC 4.58 3.77 - 5.28 10*6/mm3    Hemoglobin 14.3 12.0 - 15.9 g/dL    Hematocrit 41.5 34.0 - 46.6 %    MCV 90.6 79.0 - 97.0 fL    MCH 31.2 26.6 - 33.0 pg    MCHC 34.5 31.5 - 35.7 g/dL    RDW 11.9 (L) 12.3 - 15.4 %    RDW-SD 39.6 37.0 - 54.0 fl    MPV 11.4 6.0 - 12.0 fL    Platelets 193 140 - 450 10*3/mm3    Neutrophil % 68.1 42.7 - 76.0 %    Lymphocyte % 22.2 19.6 - 45.3 %    Monocyte % 7.0 5.0 - 12.0 %    Eosinophil % 2.0 0.3 - 6.2 %    Basophil % 0.5 0.0 - 1.5 %    Immature Grans % 0.2 0.0 - 0.5 %    Neutrophils, Absolute 6.98 1.70 - 7.00 10*3/mm3    Lymphocytes, Absolute 2.28 0.70 - 3.10 10*3/mm3    Monocytes, Absolute 0.72 0.10 - 0.90 10*3/mm3    Eosinophils, Absolute 0.21 0.00 - 0.40 10*3/mm3    Basophils, Absolute 0.05 0.00 - 0.20 10*3/mm3    Immature Grans, Absolute 0.02 0.00 - 0.05 10*3/mm3    nRBC 0.0 0.0 - 0.2 /100 WBC   Urinalysis With Microscopic If Indicated (No Culture) - Straight Cath    Collection Time: 01/22/23  1:41 AM     Specimen: Straight Cath; Urine   Result Value Ref Range    Color, UA Yellow Yellow, Straw    Appearance, UA Clear Clear    pH, UA 5.5 5.0 - 8.0    Specific Gravity, UA 1.007 1.005 - 1.030    Glucose, UA Negative Negative    Ketones, UA Negative Negative    Bilirubin, UA Negative Negative    Blood, UA Negative Negative    Protein, UA Negative Negative    Leuk Esterase, UA Trace (A) Negative    Nitrite, UA Negative Negative    Urobilinogen, UA 0.2 E.U./dL 0.2 - 1.0 E.U./dL   Urinalysis, Microscopic Only - Straight Cath    Collection Time: 01/22/23  1:41 AM    Specimen: Straight Cath; Urine   Result Value Ref Range    RBC, UA None Seen None Seen, 0-2 /HPF    WBC, UA 0-2 None Seen, 0-2 /HPF    Bacteria, UA 3+ (A) None Seen /HPF    Squamous Epithelial Cells, UA 0-2 None Seen, 0-2 /HPF    Hyaline Casts, UA None Seen None Seen /LPF    Methodology Manual Light Microscopy    Troponin    Collection Time: 01/22/23  1:59 AM    Specimen: Blood   Result Value Ref Range    Troponin T <0.010 0.000 - 0.030 ng/mL   POC Glucose Once    Collection Time: 01/22/23  3:45 AM    Specimen: Blood   Result Value Ref Range    Glucose 163 (H) 70 - 130 mg/dL   Hemoglobin A1c    Collection Time: 01/22/23  4:48 AM    Specimen: Blood   Result Value Ref Range    Hemoglobin A1C 8.40 (H) 4.80 - 5.60 %   Lipid Panel    Collection Time: 01/22/23  4:48 AM    Specimen: Blood   Result Value Ref Range    Total Cholesterol 151 0 - 200 mg/dL    Triglycerides 113 0 - 150 mg/dL    HDL Cholesterol 56 40 - 60 mg/dL    LDL Cholesterol  75 0 - 100 mg/dL    VLDL Cholesterol 20 5 - 40 mg/dL    LDL/HDL Ratio 1.29    Basic Metabolic Panel    Collection Time: 01/22/23  4:48 AM    Specimen: Blood   Result Value Ref Range    Glucose 169 (H) 65 - 99 mg/dL    BUN 16 8 - 23 mg/dL    Creatinine 0.52 (L) 0.57 - 1.00 mg/dL    Sodium 143 136 - 145 mmol/L    Potassium 3.9 3.5 - 5.2 mmol/L    Chloride 106 98 - 107 mmol/L    CO2 28.3 22.0 - 29.0 mmol/L    Calcium 9.6 8.6 - 10.5  mg/dL    BUN/Creatinine Ratio 30.8 (H) 7.0 - 25.0    Anion Gap 8.7 5.0 - 15.0 mmol/L    eGFR 92.9 >60.0 mL/min/1.73   CBC Auto Differential    Collection Time: 01/22/23  4:48 AM    Specimen: Blood   Result Value Ref Range    WBC 9.92 3.40 - 10.80 10*3/mm3    RBC 4.09 3.77 - 5.28 10*6/mm3    Hemoglobin 13.0 12.0 - 15.9 g/dL    Hematocrit 37.7 34.0 - 46.6 %    MCV 92.2 79.0 - 97.0 fL    MCH 31.8 26.6 - 33.0 pg    MCHC 34.5 31.5 - 35.7 g/dL    RDW 12.1 (L) 12.3 - 15.4 %    RDW-SD 40.8 37.0 - 54.0 fl    MPV 11.6 6.0 - 12.0 fL    Platelets 179 140 - 450 10*3/mm3    Neutrophil % 59.2 42.7 - 76.0 %    Lymphocyte % 28.5 19.6 - 45.3 %    Monocyte % 9.3 5.0 - 12.0 %    Eosinophil % 2.2 0.3 - 6.2 %    Basophil % 0.6 0.0 - 1.5 %    Immature Grans % 0.2 0.0 - 0.5 %    Neutrophils, Absolute 5.87 1.70 - 7.00 10*3/mm3    Lymphocytes, Absolute 2.83 0.70 - 3.10 10*3/mm3    Monocytes, Absolute 0.92 (H) 0.10 - 0.90 10*3/mm3    Eosinophils, Absolute 0.22 0.00 - 0.40 10*3/mm3    Basophils, Absolute 0.06 0.00 - 0.20 10*3/mm3    Immature Grans, Absolute 0.02 0.00 - 0.05 10*3/mm3    nRBC 0.1 0.0 - 0.2 /100 WBC   POC Glucose Once    Collection Time: 01/22/23  6:11 AM    Specimen: Blood   Result Value Ref Range    Glucose 138 (H) 70 - 130 mg/dL       Ordered the above labs and independently reviewed the results.        RADIOLOGY  CT Head Without Contrast    Result Date: 1/22/2023  CT HEAD WITHOUT CONTRAST  HISTORY: Slurred speech  COMPARISON: None available.  TECHNIQUE: Axial CT imaging was obtained through the brain. No IV contrast was administered.  FINDINGS: No acute intracranial hemorrhage is seen. There is mild thinning. There is mucosal thickening identified within the ethmoid and right sphenoid sinuses. Mastoid air cells are clear. There is periventricular and deep white matter microangiopathic change. There is no midline shift or mass effect.      No acute intracranial findings.  Radiation dose reduction techniques were utilized,  including automated exposure control and exposure modulation based on body size.  This report was finalized on 1/22/2023 12:47 AM by Dr. Obdulia Hernandez M.D.        I ordered the above noted radiological studies. Reviewed by me and discussed with radiologist.  See dictation for official radiology interpretation.      PROCEDURES    Procedures      MEDICATIONS GIVEN IN ER    Medications   sodium chloride 0.9 % flush 10 mL (has no administration in time range)   sodium chloride 0.9 % flush 10 mL (10 mL Intravenous Given 1/22/23 0326)   sodium chloride 0.9 % flush 10 mL (has no administration in time range)   sodium chloride 0.9 % infusion 40 mL (has no administration in time range)   atorvastatin (LIPITOR) tablet 80 mg (80 mg Oral Given 1/22/23 0343)   dextrose (GLUTOSE) oral gel 15 g (has no administration in time range)   dextrose (D50W) (25 g/50 mL) IV injection 25 g (has no administration in time range)   glucagon (human recombinant) (GLUCAGEN DIAGNOSTIC) injection 1 mg (has no administration in time range)   sodium chloride 0.9 % infusion (100 mL/hr Intravenous Currently Infusing 1/22/23 0613)   acetaminophen (TYLENOL) tablet 650 mg (has no administration in time range)     Or   acetaminophen (TYLENOL) 160 MG/5ML solution 650 mg (has no administration in time range)     Or   acetaminophen (TYLENOL) suppository 650 mg (has no administration in time range)   insulin lispro (ADMELOG) injection 0-7 Units (0 Units Subcutaneous Not Given 1/22/23 0630)   ondansetron (ZOFRAN) injection 4 mg (has no administration in time range)   aspirin tablet 325 mg (has no administration in time range)     Or   aspirin suppository 300 mg (has no administration in time range)         PROGRESS, DATA ANALYSIS, CONSULTS, AND MEDICAL DECISION MAKING    All labs have been independently reviewed by me.  All radiology studies have been reviewed by me and discussed with radiologist dictating the report.   EKG's independently viewed and  interpreted by me.  Discussion below represents my analysis of pertinent findings related to patient's condition, differential diagnosis, treatment plan and final disposition.    My differential diagnosis includes but is not limited to:  Hypoglycemia, hyperglycemia, DKA, overdose, ethanol intoxication, thiamine deficiency, niacin deficiency, hypothymia, hyperviscosity, Brijesh’s disease, hyponatremia, hypernatremia, liver failure, kidney failure, hyper or hypothyroid, no insufficiency, hypoxia, hypercarbia, carbon monoxide poisoning, postanoxic encephalopathy, ischemic stroke, intracranial bleed, subarachnoid hemorrhage, brain tumor, closed head injury, epidural hematoma, epidural hematoma, seizure activity, postictal state, syncopal episode, disseminated encephalomyelitis, central pontine myelinolysis, post cardiac arrest, bacterial meningitis, viral meningitis, fungal meningitis, encephalitis, brain abscess, subdural empyema, hysteria, catatonic state, malingering, hypertensive encephalopathy, vasculitis, TTP, DIC      ED Course as of 01/22/23 0716   Sun Jan 22, 2023   0116 EKG          EKG time: 0012  Rhythm/Rate: Sinus rhythm rate 77  P waves and NM: Normal  QRS, axis: IVCD  ST and T waves: Nonspecific    Interpreted Contemporaneously by me, independently viewed [TJ]   0132 Was spoken with patient's son he states that patient's speech sounds groggy and tired but is not more slurred than baseline. [TJ]   0145 Work-up benign.  Patient is at baseline.  Will admit for TIA work-up. [TJ]      ED Course User Index  [TJ] Miguel Snider MD           PPE: The patient wore a mask and I wore an N95 mask throughout the entire patient encounter.       AS OF 07:16 EST VITALS:    BP - 124/85  HR - 65  TEMP - 97.8 °F (36.6 °C) (Oral)  O2 SATS - 97%        DIAGNOSIS  Final diagnoses:   TIA (transient ischemic attack)         DISPOSITION  ED Disposition     ED Disposition   Decision to Admit    Condition   --    Comment    Level of Care: Telemetry [5]   Diagnosis: TIA (transient ischemic attack) [420644]   Admitting Physician: SHREYA VENEGAS [[id number]]                  Note Disclaimer: At [facility], we believe that sharing information builds trust and better relationships. You are receiving this note because you recently visited [facility]. It is possible you will see health information before a provider has talked with you about it. This kind of information can be easy to misunderstand. To help you fully understand what it means for your health, we urge you to discuss this note with your provider.       Miguel Snider MD  01/22/23 0750

## 2023-01-22 NOTE — PLAN OF CARE
Goal Outcome Evaluation:              Outcome Evaluation: A&O x4. NIH 4. MRI/CT neg for stroke. Ordered CT head and neck with contrast. Needs IV placed. Attempted x3 different RNs. Awaiting CT to call when ready. MD aware. Returned from Echo. Sat up in chair for most of the day. Pilots to BSC or restroom from [facility].

## 2023-01-22 NOTE — THERAPY EVALUATION
Patient Name: Alice Parisi  : 1940    MRN: [id number]                              Today's Date: 2023       Admit Date: 2023    Visit Dx:     ICD-10-CM ICD-9-CM   1. TIA (transient ischemic attack)  G45.9 435.9     Patient Active Problem List   Diagnosis   • DM2 (diabetes mellitus, type 2) (HCC)   • Generalized OA   • HTN (hypertension), benign   • Hypothyroid   • Impaired mobility   • Obesity   • Post-polio syndrome   • Hyperlipidemia   • Depression   • OAB (overactive bladder)   • Foot pain, left   • History of gout   • Dependent edema   • TIA (transient ischemic attack)     Past Medical History:   Diagnosis Date   • Arthritis    • Depression    • Diabetes mellitus (HCC)    • Hyperlipidemia    • Hypertension    • Incontinence    • Insomnia    • Obesity    • Postpolio syndrome    • Sleep apnea    • UTI (urinary tract infection)      Past Surgical History:   Procedure Laterality Date   • APPENDECTOMY     • CATARACT EXTRACTION     • EYELID LACERATION REPAIR     • GALLBLADDER SURGERY     • HYSTERECTOMY     • KNEE SURGERY     • LUMBAR DISC SURGERY        General Information     Row Name 23 1039          Physical Therapy Time and Intention    Document Type evaluation  -CS     Mode of Treatment physical therapy  -CS     Row Name 23 1039          General Information    Patient Profile Reviewed yes  -CS     Prior Level of Function w/c or scooter  -CS     Row Name 23 1039          Cognition    Orientation Status (Cognition) oriented x 3  -CS     Row Name 23 1039          Safety Issues, Functional Mobility    Impairments Affecting Function (Mobility) balance;endurance/activity tolerance;pain;range of motion (ROM);visual/perceptual  -CS           User Key  (r) = Recorded By, (t) = Taken By, (c) = Cosigned By    Initials Name Provider Type    Jacob Velez, PT Physical Therapist               Mobility     Row Name 23 1039          Bed Mobility    Bed Mobility  supine-sit;sit-supine  -CS     Supine-Sit [place] (Bed Mobility) moderate assist (50% patient effort);verbal cues;nonverbal cues (demo/gesture)  -CS     Sit-Supine [place] (Bed Mobility) moderate assist (50% patient effort);nonverbal cues (demo/gesture);verbal cues  -CS     Assistive Device (Bed Mobility) bed rails;head of bed elevated  -[facility] Name 01/22/23 1039          Sit-Stand Transfer    Comment, (Sit-Stand Transfer) not tested  -           User Key  (r) = Recorded By, (t) = Taken By, (c) = Cosigned By    Initials Name Provider Type    Jacob Velez, PT Physical Therapist               Obj/Interventions     [facility] Name 01/22/23 1040          Range of Motion Comprehensive    Comment, General Range of Motion RLE no knee extension or ankle dorsiflexion history of weakness  -[facility] Name 01/22/23 1040          Strength Comprehensive (MMT)    Comment, General Manual Muscle Testing (MMT) Assessment RLE not tested- LLE >3+/5 strength  -[facility] Name 01/22/23 1040          Motor Skills    Therapeutic Exercise --  arms overhead, LLE LAQ, SLR, AP, marches, arm punches x10  -           User Key  (r) = Recorded By, (t) = Taken By, (c) = Cosigned By    Initials Name Provider Type    Jacob Velez, PT Physical Therapist               Goals/Plan     [facility] Name 01/22/23 1042          Bed Mobility Goal 1 (PT)    Activity/Assistive Device (Bed Mobility Goal 1, PT) bed mobility activities, all  -CS     Time Frame (Bed Mobility Goal 1, PT) 1 week  -[facility] Name 01/22/23 1042          Transfer Goal 1 (PT)    Activity/Assistive Device (Transfer Goal 1, PT) transfers, all  -CS     [place] Level/Cues Needed (Transfer Goal 1, PT) standby assist  -[facility] Name 01/22/23 1042          Therapy Assessment/Plan (PT)    Planned Therapy Interventions (PT) bed mobility training;balance training;transfer training;home exercise program;patient/family education;neuromuscular re-education;manual therapy  techniques;strengthening;stretching  -CS           User Key  (r) = Recorded By, (t) = Taken By, (c) = Cosigned By    Initials Name Provider Type    Jacob Velez, PT Physical Therapist               Clinical Impression     Row Name 01/22/23 1041          Pain    Pain Intervention(s) Ambulation/increased activity;Repositioned  -CS     Additional Documentation Pain Scale: FACES Pre/Post-Treatment (Group)  -CS     Row Name 01/22/23 1041          Pain Scale: FACES Pre/Post-Treatment    Pain: FACES Scale, Pretreatment 6-->hurts even more  -CS     Posttreatment Pain Rating 6-->hurts even more  -CS     Row Name 01/22/23 1041          Plan of Care Review    Plan of Care Reviewed With patient  -CS     Row Name 01/22/23 1041          Therapy Assessment/Plan (PT)    Patient/Family Therapy Goals Statement (PT) home  -CS     Rehab Potential (PT) good, to achieve stated therapy goals  -CS     Criteria for Skilled Interventions Met (PT) yes  -CS     Therapy Frequency (PT) 5 times/wk  -CS     Row Name 01/22/23 1041          Positioning and Restraints    Pre-Treatment Position in bed  -CS     Post Treatment Position bed  -CS     In Bed supine;call light within reach;encouraged to call for assist;with family/caregiver;with nsg  -CS           User Key  (r) = Recorded By, (t) = Taken By, (c) = Cosigned By    Initials Name Provider Type    Jacob Velez, PT Physical Therapist               Outcome Measures     Row Name 01/22/23 1042          How much help from another person do you currently need...    Turning from your back to your side while in flat bed without using bedrails? 2  -CS     Moving from lying on back to sitting on the side of a flat bed without bedrails? 2  -CS     Moving to and from a bed to a chair (including a wheelchair)? 2  -CS     Standing up from a chair using your arms (e.g., wheelchair, bedside chair)? 2  -CS     Climbing 3-5 steps with a railing? 1  -CS     To walk in hospital room? 1  -CS     AM-PAC  6 Clicks Score (PT) 10  -CS     Highest level of mobility 4 --> Transferred to chair/commode  -CS     Row Name 01/22/23 1042          Functional Assessment    Outcome Measure Options AM-PAC 6 Clicks Basic Mobility (PT)  -CS           User Key  (r) = Recorded By, (t) = Taken By, (c) = Cosigned By    Initials Name Provider Type    CS Jacob Duran, PT Physical Therapist                             Physical Therapy Education     Title: PT OT SLP Therapies (Done)     Topic: Physical Therapy (Done)     Point: Mobility training (Done)     Learning Progress Summary           Patient Acceptance, E,TB, VU,NR by  at 1/22/2023 1042                   Point: Home exercise program (Done)     Learning Progress Summary           Patient Acceptance, E,TB, VU,NR by CS at 1/22/2023 1042                   Point: Body mechanics (Done)     Learning Progress Summary           Patient Acceptance, E,TB, VU,NR by CS at 1/22/2023 1042                   Point: Precautions (Done)     Learning Progress Summary           Patient Acceptance, E,TB, VU,NR by  at 1/22/2023 1042                               User Key     Initials Effective Dates Name Provider Type Discipline     06/16/21 -  Jacob Duran, PT Physical Therapist PT              PT Recommendation and Plan  Planned Therapy Interventions (PT): bed mobility training, balance training, transfer training, home exercise program, patient/family education, neuromuscular re-education, manual therapy techniques, strengthening, stretching  Plan of Care Reviewed With: patient     Time Calculation:    PT Charges     Row Name 01/22/23 1045             Time Calculation    Start Time 1005  -CS      Stop Time 1020  -      Time Calculation (min) 15 min  -      PT Received On 01/22/23  -      PT - Next Appointment 01/23/23  -            User Key  (r) = Recorded By, (t) = Taken By, (c) = Cosigned By    Initials Name Provider Type    CS Jacob Duran, PT Physical Therapist               Therapy Charges for Today     Code Description Service Date Service Provider Modifiers Qty    24738100625 HC PT EVAL MOD COMPLEXITY 2 1/22/2023 Jacob Duran, PT GP 1    00156155482 HC PT THER PROC EA 15 MIN 1/22/2023 Jacob Duran, PT GP 1          PT G-Codes  Outcome Measure Options: AM-PAC 6 Clicks Basic Mobility (PT)  AM-PAC 6 Clicks Score (PT): 10  PT Discharge Summary  Anticipated Discharge Disposition (PT): skilled nursing facility, home with home health    Jacob Duran, PT  1/22/2023

## 2023-01-22 NOTE — PLAN OF CARE
Goal Outcome Evaluation:  Plan of Care Reviewed With: patient        Progress: no change  Outcome Evaluation: Patient admitted overnight d/t new onset of slurred speech. NIH 2. Patient still experiencing slurred speech. MRI to be completed. Neurology consulted. IVF started. Remains NPO d/t failed dysphagia screen. Speech to evaluate. Labs for the morning.      Problem: Skin Injury Risk Increased  Goal: Skin Health and Integrity  Outcome: Ongoing, Progressing  Intervention: Optimize Skin Protection  Recent Flowsheet Documentation  Taken 1/22/2023 0456 by Юлия Lala RN  Pressure Reduction Techniques:   frequent weight shift encouraged   weight shift assistance provided  Head of Bed (HOB) Positioning: HOB at 20-30 degrees  Pressure Reduction Devices: alternating pressure pump (ADD)  Skin Protection:   adhesive use limited   incontinence pads utilized     Problem: Adjustment to Illness (Stroke, Ischemic/Transient Ischemic Attack)  Goal: Optimal Coping  Outcome: Ongoing, Progressing  Intervention: Support Psychosocial Response to Stroke  Recent Flowsheet Documentation  Taken 1/22/2023 0456 by Юлия Lala RN  Family/Support System Care:   self-care encouraged   support provided     Problem: Fall Injury Risk  Goal: Absence of Fall and Fall-Related Injury  Outcome: Ongoing, Progressing  Intervention: Identify and Manage Contributors  Recent Flowsheet Documentation  Taken 1/22/2023 0456 by Юлия Lala RN  Medication Review/Management: medications reviewed  Intervention: Promote Injury-Free Environment  Recent Flowsheet Documentation  Taken 1/22/2023 0456 by Юлия Lala RN  Safety Promotion/Fall Prevention:   activity supervised   assistive device/personal items within reach   clutter free environment maintained   fall prevention program maintained   nonskid shoes/slippers when out of bed   room organization consistent   safety round/check completed     Problem: Urinary Elimination Impaired (Stroke, Ischemic/Transient  Ischemic Attack)  Goal: Effective Urinary Elimination  Outcome: Ongoing, Progressing

## 2023-01-22 NOTE — PLAN OF CARE
Problem: Adult Inpatient Plan of Care  Goal: Plan of Care Review  Flowsheets (Taken 1/22/2023 1417)  Plan of Care Reviewed With:   patient   friend  Outcome Evaluation: Mastication functional when indicated with functional bolus consolidaiton and transfer.  No significant oral residuals noted after the swallow.  The pharyngeal swallow reflex felt timely upon palpation with adequate laryngeal elevation.  No overt s/s of aspiration noted.  Recommend a regular texture diet with thin liquids and meds as tolerated.  Swallow precautions include sit upright, small bites and sips, and okay to use straws.  Will monitor diet tolerance and for the need for a VFSS/FEES as well as the need for a full speech language swallow evaluation.  Thank you for this consult.   Goal Outcome Evaluation:  Plan of Care Reviewed With: patient, friend           Outcome Evaluation: Mastication functional when indicated with functional bolus consolidaiton and transfer.  No significant oral residuals noted after the swallow.  The pharyngeal swallow reflex felt timely upon palpation with adequate laryngeal elevation.  No overt s/s of aspiration noted.  Recommend a regular texture diet with thin liquids and meds as tolerated.  Swallow precautions include sit upright, small bites and sips, and okay to use straws.  Will monitor diet tolerance and for the need for a VFSS/FEES as well as the need for a full speech language swallow evaluation.  Thank you for this consult.

## 2023-01-22 NOTE — ED NOTES
"Pt arrives from [facility] via [facility] #[id number].    EMS states \"Called out for slurred speech. She complains of Dry mouth, however is AxOx4. Negative on the CSS. No noted deficits.\"      Patient was placed in face mask during first look triage.  Patient was wearing a face mask throughout encounter.  I wore personal protective equipment throughout the encounter.  Hand hygiene was performed before and after patient encounter.        "

## 2023-01-22 NOTE — H&P
"    Patient Name:  Alice Parisi  YOB: 1940  MRN:  [id number]  Admit Date:  1/22/2023  Patient Care Team:  Geronimo Fischer MD as PCP - General (Internal Medicine)      Subjective   History Present Illness     Chief Complaint   Patient presents with   • Speech Problem       Ms. Parisi is a 82 y.o. female with history of polio (left leg deficits), DM2, HTN, hypothyroid who presented with reported word finding difficulty and slurred speech.  She reportedly took her home meds around 9 PM last night, when she felt \"drunk.\"  She has trouble recalling episode, though states that she just felt that she could not talk.  Denied any weakness or numbness/tingling.  No vision changes.  She does note that she has some jaw pain this morning, though denies any history of intermittent claudication.  She reports being back to her baseline this morning.  Denies fevers, chills, chest pain, shortness of breath, N/V/D.    History of Present Illness  Review of Systems   Constitutional: Negative for activity change, appetite change, diaphoresis and fatigue.   HENT: Negative for congestion, ear discharge, mouth sores and tinnitus.    Eyes: Negative for pain, discharge and redness.   Respiratory: Negative for cough, choking, chest tightness, shortness of breath and wheezing.    Cardiovascular: Negative for chest pain and palpitations.   Gastrointestinal: Negative for abdominal pain, constipation, diarrhea and nausea.   Endocrine: Negative for cold intolerance and heat intolerance.   Genitourinary: Negative for flank pain, frequency, hematuria, urgency and vaginal discharge.   Musculoskeletal: Negative for back pain, joint swelling and neck pain.   Skin: Negative for color change and rash.   Neurological: Negative for syncope, speech difficulty and headaches.   Psychiatric/Behavioral: Negative for agitation, behavioral problems and hallucinations.        Personal History     Past Medical History:   Diagnosis Date   • Arthritis "    • Depression    • Diabetes mellitus (HCC)    • Hyperlipidemia    • Hypertension    • Incontinence    • Insomnia    • Obesity    • Postpolio syndrome    • Sleep apnea    • UTI (urinary tract infection)      Past Surgical History:   Procedure Laterality Date   • APPENDECTOMY     • CATARACT EXTRACTION     • EYELID LACERATION REPAIR     • GALLBLADDER SURGERY     • HYSTERECTOMY     • KNEE SURGERY     • LUMBAR DISC SURGERY       Family History   Problem Relation Age of Onset   • Diabetes Mother    • Diabetes Brother    • Heart disease Brother      Social History     Tobacco Use   • Smoking status: Never   • Smokeless tobacco: Never   Vaping Use   • Vaping Use: Never used   Substance Use Topics   • Alcohol use: No   • Drug use: Never     No current facility-administered medications on file prior to encounter.     Current Outpatient Medications on File Prior to Encounter   Medication Sig Dispense Refill   • ACCU-CHEK PARRIS PLUS test strip USE TO TEST TWICE A DAY DX: TYPE 2 DIABETES MELLITUS ICD 10: E11.9 300 each 3   • Accu-Chek FastClix Lancets misc USE TO TEST TWICE A  each 3   • Blood Glucose Monitoring Suppl (ACCU-CHEK PARRIS PLUS) w/Device kit Use to test twice a Day for Type 2 Diabetes ICD 10 E11.9 Pt's machine broke this is a replacement machine 1 kit 0   • citalopram (CeleXA) 10 MG tablet Take 1 tablet by mouth Daily. 90 tablet 3   • furosemide (LASIX) 20 MG tablet Take 1 tablet by mouth Daily. 90 tablet 3   • HYDROcodone-acetaminophen (NORCO) 5-325 MG per tablet Take 1 tablet by mouth Every 6 (Six) Hours As Needed for Mild Pain or Moderate Pain.     • levothyroxine (SYNTHROID, LEVOTHROID) 50 MCG tablet Take 1 tablet by mouth Daily. 90 tablet 3   • losartan (COZAAR) 50 MG tablet Take 1 tablet by mouth Daily. (Patient taking differently: Take 75 mg by mouth Daily.) 90 tablet 1   • melatonin 1 MG tablet Take 2 mg by mouth At Night As Needed for Sleep.     • metFORMIN (GLUCOPHAGE) 500 MG tablet Take 1 tablet  by mouth Daily With Breakfast. 90 tablet 3   • mupirocin (BACTROBAN) 2 % ointment Apply 1 application topically to the appropriate area as directed Daily. Apply to open area on left thigh     • oxybutynin (DITROPAN) 5 MG tablet Take 5 mg by mouth 3 (Three) Times a Day.     • simvastatin (ZOCOR) 40 MG tablet Take 1 tablet by mouth Every Night. (Patient taking differently: Take 20 mg by mouth Every Night.) 90 tablet 3   • atenolol (TENORMIN) 25 MG tablet Take 1 tablet by mouth 2 (Two) Times a Day. 180 tablet 3   • hydroCHLOROthiazide (HYDRODIURIL) 12.5 MG tablet Take 1 tablet by mouth Daily. 90 tablet 1   • Hydrocortisone (Geena's magic butt cream) Apply 1 application topically to the appropriate area as directed As Needed (infection/discomfort). 60 g 0   • meloxicam (Mobic) 7.5 MG tablet Take 1 tablet by mouth Daily. 14 tablet 0   • nystatin (MYCOSTATIN) 428220 UNIT/GM cream Apply 1 application topically to the appropriate area as directed As Needed.       Allergies   Allergen Reactions   • Shellfish-Derived Products Swelling       Objective    Objective     Vital Signs  Temp:  [97.6 °F (36.4 °C)-98 °F (36.7 °C)] 98 °F (36.7 °C)  Heart Rate:  [61-74] 65  Resp:  [16-18] 18  BP: (124-154)/(62-85) 154/62  SpO2:  [95 %-98 %] 97 %  on   ;   Device (Oxygen Therapy): room air  Body mass index is 40.3 kg/m².    Physical Exam  Constitutional:       General: She is not in acute distress.     Appearance: Normal appearance. She is not toxic-appearing.   Eyes:      Extraocular Movements: Extraocular movements intact.      Pupils: Pupils are equal, round, and reactive to light.   Cardiovascular:      Rate and Rhythm: Normal rate and regular rhythm.      Pulses: Normal pulses.      Heart sounds: Normal heart sounds. No murmur heard.    No gallop.   Pulmonary:      Effort: Pulmonary effort is normal. No respiratory distress.      Breath sounds: Normal breath sounds. No wheezing.   Abdominal:      General: Abdomen is flat. There is no  distension.      Palpations: Abdomen is soft.      Tenderness: There is no abdominal tenderness.   Musculoskeletal:      Right lower leg: No edema.      Left lower leg: No edema.      Comments: Left lower extremity 4/5 strength, chronic weakness secondary to polio infection   Skin:     General: Skin is warm.      Coloration: Skin is not jaundiced.   Neurological:      General: No focal deficit present.      Mental Status: She is alert.      Comments: Mild dysarthria, baseline per patient   Psychiatric:         Mood and Affect: Mood normal.       Results Review:    I have personally reviewed:  [x]  Laboratory  [x]  Old records  [x]  Radiology    [x]  EKG/Telemetry   []  Microbiology    []  Cardiology/Vascular   []  Pathology     Lab Results (last 24 hours)     Procedure Component Value Units Date/Time    CBC & Differential [[id number]]  (Abnormal) Collected: 01/22/23 0024    Specimen: Blood from Arm, Right Updated: 01/22/23 0035    Narrative:      The following orders were created for panel order CBC & Differential.  Procedure                               Abnormality         Status                     ---------                               -----------         ------                     CBC Auto Differential[[id number]]        Abnormal            Final result                 Please view results for these tests on the individual orders.    Comprehensive Metabolic Panel [[id number]]  (Abnormal) Collected: 01/22/23 0024    Specimen: Blood from Arm, Right Updated: 01/22/23 0055     Glucose 199 mg/dL      BUN 18 mg/dL      Creatinine 0.62 mg/dL      Sodium 139 mmol/L      Potassium 4.0 mmol/L      Chloride 102 mmol/L      CO2 26.8 mmol/L      Calcium 10.1 mg/dL      Total Protein 6.8 g/dL      Albumin 3.8 g/dL      ALT (SGPT) 25 U/L      AST (SGOT) 22 U/L      Alkaline Phosphatase 83 U/L      Total Bilirubin 0.3 mg/dL      Globulin 3.0 gm/dL      A/G Ratio 1.3 g/dL      BUN/Creatinine Ratio 29.0     Anion Gap 10.2 mmol/L       eGFR 89.0 mL/min/1.73     Narrative:      GFR Normal >60  Chronic Kidney Disease <60  Kidney Failure <15    The GFR formula is only valid for adults with stable renal function between ages 18 and 70.    Protime-INR [[id number]]  (Normal) Collected: 01/22/23 0024    Specimen: Blood from Arm, Right Updated: 01/22/23 0052     Protime 13.4 Seconds      INR 1.01    aPTT [[id number]]  (Normal) Collected: 01/22/23 0024    Specimen: Blood from Arm, Right Updated: 01/22/23 0052     PTT 29.7 seconds     Troponin [[id number]]  (Normal) Collected: 01/22/23 0024    Specimen: Blood from Arm, Right Updated: 01/22/23 0055     Troponin T <0.010 ng/mL     Narrative:      Troponin T Reference Range:  <= 0.03 ng/mL-   Negative for AMI  >0.03 ng/mL-     Abnormal for myocardial necrosis.  Clinicians would have to utilize clinical acumen, EKG, Troponin and serial changes to determine if it is an Acute Myocardial Infarction or myocardial injury due to an underlying chronic condition.       Results may be falsely decreased if patient taking Biotin.      CBC Auto Differential [[id number]]  (Abnormal) Collected: 01/22/23 0024    Specimen: Blood from Arm, Right Updated: 01/22/23 0035     WBC 10.26 10*3/mm3      RBC 4.58 10*6/mm3      Hemoglobin 14.3 g/dL      Hematocrit 41.5 %      MCV 90.6 fL      MCH 31.2 pg      MCHC 34.5 g/dL      RDW 11.9 %      RDW-SD 39.6 fl      MPV 11.4 fL      Platelets 193 10*3/mm3      Neutrophil % 68.1 %      Lymphocyte % 22.2 %      Monocyte % 7.0 %      Eosinophil % 2.0 %      Basophil % 0.5 %      Immature Grans % 0.2 %      Neutrophils, Absolute 6.98 10*3/mm3      Lymphocytes, Absolute 2.28 10*3/mm3      Monocytes, Absolute 0.72 10*3/mm3      Eosinophils, Absolute 0.21 10*3/mm3      Basophils, Absolute 0.05 10*3/mm3      Immature Grans, Absolute 0.02 10*3/mm3      nRBC 0.0 /100 WBC     Urinalysis With Microscopic If Indicated (No Culture) - Straight Cath [[id number]]  (Abnormal) Collected: 01/22/23 0141     Specimen: Urine from Straight Cath Updated: 01/22/23 0158     Color, UA Yellow     Appearance, UA Clear     pH, UA 5.5     Specific Gravity, UA 1.007     Glucose, UA Negative     Ketones, UA Negative     Bilirubin, UA Negative     Blood, UA Negative     Protein, UA Negative     Leuk Esterase, UA Trace     Nitrite, UA Negative     Urobilinogen, UA 0.2 E.U./dL    Urinalysis, Microscopic Only - Straight Cath [[id number]]  (Abnormal) Collected: 01/22/23 0141    Specimen: Urine from Straight Cath Updated: 01/22/23 0231     RBC, UA None Seen /HPF      WBC, UA 0-2 /HPF      Bacteria, UA 3+ /HPF      Squamous Epithelial Cells, UA 0-2 /HPF      Hyaline Casts, UA None Seen /LPF      Methodology Manual Light Microscopy    Troponin [[id number]]  (Normal) Collected: 01/22/23 0159    Specimen: Blood Updated: 01/22/23 0225     Troponin T <0.010 ng/mL     Narrative:      Troponin T Reference Range:  <= 0.03 ng/mL-   Negative for AMI  >0.03 ng/mL-     Abnormal for myocardial necrosis.  Clinicians would have to utilize clinical acumen, EKG, Troponin and serial changes to determine if it is an Acute Myocardial Infarction or myocardial injury due to an underlying chronic condition.       Results may be falsely decreased if patient taking Biotin.      POC Glucose Once [[id number]]  (Abnormal) Collected: 01/22/23 0345    Specimen: Blood Updated: 01/22/23 0346     Glucose 163 mg/dL      Comment: Meter: [id number] : [id number] Yani Sylvester RN       Hemoglobin A1c [[id number]]  (Abnormal) Collected: 01/22/23 0448    Specimen: Blood Updated: 01/22/23 0520     Hemoglobin A1C 8.40 %     Narrative:      Hemoglobin A1C Ranges:    Increased Risk for Diabetes  5.7% to 6.4%  Diabetes                     >= 6.5%  Diabetic Goal                < 7.0%    Lipid Panel [[id number]] Collected: 01/22/23 0448    Specimen: Blood Updated: 01/22/23 0527     Total Cholesterol 151 mg/dL      Triglycerides 113 mg/dL      HDL Cholesterol 56 mg/dL      LDL  Cholesterol  75 mg/dL      VLDL Cholesterol 20 mg/dL      LDL/HDL Ratio 1.29    Narrative:      Cholesterol Reference Ranges  (U.S. Department of Health and Human Services ATP III Classifications)    Desirable          <200 mg/dL  Borderline High    200-239 mg/dL  High Risk          >240 mg/dL      Triglyceride Reference Ranges  (U.S. Department of Health and Human Services ATP III Classifications)    Normal           <150 mg/dL  Borderline High  150-199 mg/dL  High             200-499 mg/dL  Very High        >500 mg/dL    HDL Reference Ranges  (U.S. Department of Health and Human Services ATP III Classifications)    Low     <40 mg/dl (major risk factor for CHD)  High    >60 mg/dl ('negative' risk factor for CHD)        LDL Reference Ranges  (U.S. Department of Health and Human Services ATP III Classifications)    Optimal          <100 mg/dL  Near Optimal     100-129 mg/dL  Borderline High  130-159 mg/dL  High             160-189 mg/dL  Very High        >189 mg/dL    Basic Metabolic Panel [[id number]]  (Abnormal) Collected: 01/22/23 0448    Specimen: Blood Updated: 01/22/23 0527     Glucose 169 mg/dL      BUN 16 mg/dL      Creatinine 0.52 mg/dL      Sodium 143 mmol/L      Potassium 3.9 mmol/L      Chloride 106 mmol/L      CO2 28.3 mmol/L      Calcium 9.6 mg/dL      BUN/Creatinine Ratio 30.8     Anion Gap 8.7 mmol/L      eGFR 92.9 mL/min/1.73     Narrative:      GFR Normal >60  Chronic Kidney Disease <60  Kidney Failure <15    The GFR formula is only valid for adults with stable renal function between ages 18 and 70.    CBC Auto Differential [[id number]]  (Abnormal) Collected: 01/22/23 0448    Specimen: Blood Updated: 01/22/23 0506     WBC 9.92 10*3/mm3      RBC 4.09 10*6/mm3      Hemoglobin 13.0 g/dL      Hematocrit 37.7 %      MCV 92.2 fL      MCH 31.8 pg      MCHC 34.5 g/dL      RDW 12.1 %      RDW-SD 40.8 fl      MPV 11.6 fL      Platelets 179 10*3/mm3      Neutrophil % 59.2 %      Lymphocyte % 28.5 %      Monocyte  % 9.3 %      Eosinophil % 2.2 %      Basophil % 0.6 %      Immature Grans % 0.2 %      Neutrophils, Absolute 5.87 10*3/mm3      Lymphocytes, Absolute 2.83 10*3/mm3      Monocytes, Absolute 0.92 10*3/mm3      Eosinophils, Absolute 0.22 10*3/mm3      Basophils, Absolute 0.06 10*3/mm3      Immature Grans, Absolute 0.02 10*3/mm3      nRBC 0.1 /100 WBC     POC Glucose Once [[id number]]  (Abnormal) Collected: 01/22/23 0611    Specimen: Blood Updated: 01/22/23 0612     Glucose 138 mg/dL      Comment: Meter: [id number] : [id number] Kpintchame Sibite NA       POC Glucose Once [[id number]]  (Normal) Collected: 01/22/23 1131    Specimen: Blood Updated: 01/22/23 1135     Glucose 130 mg/dL      Comment: Meter: [id number] : [id number] Rankin Riccardo NA             Imaging Results (Last 24 Hours)     Procedure Component Value Units Date/Time    MRI Brain Without Contrast [[id number]] Collected: 01/22/23 1053     Updated: 01/22/23 1058    Narrative:      MR SCAN OF THE BRAIN WITHOUT CONTRAST     HISTORY: Episode of slurred speech.     The MR scan was performed with sagittal and axial images without  contrast. There is mild diffuse atrophy and chronic small vessel  ischemic change. There is no evidence of intracranial hemorrhage or mass  effect. The diffusion sequence shows no evidence of acute infarct.     There are normal flow voids in the major vessels. The sinuses and  mastoid air cells are clear.     CONCLUSION: Mild diffuse atrophy and chronic small vessel ischemic  change. No evidence of acute infarct.     This report was finalized on 1/22/2023 10:55 AM by Dr. Sunny Wright M.D.       CT Head Without Contrast [[id number]] Collected: 01/22/23 0045     Updated: 01/22/23 0050    Narrative:      CT HEAD WITHOUT CONTRAST     HISTORY: Slurred speech     COMPARISON: None available.     TECHNIQUE: Axial CT imaging was obtained through the brain. No IV  contrast was administered.     FINDINGS:  No acute intracranial hemorrhage  is seen. There is mild thinning. There  is mucosal thickening identified within the ethmoid and right sphenoid  sinuses. Mastoid air cells are clear. There is periventricular and deep  white matter microangiopathic change. There is no midline shift or mass  effect.       Impression:      No acute intracranial findings.     Radiation dose reduction techniques were utilized, including automated  exposure control and exposure modulation based on body size.     This report was finalized on 1/22/2023 12:47 AM by Dr. Obdulia Hernandez M.D.                 ECG 12 Lead Stroke Evaluation   Final Result   HEART RATE= 77  bpm   RR Interval= 779  ms   IA Interval= 137  ms   P Horizontal Axis= -23  deg   P Front Axis= 79  deg   QRSD Interval= 141  ms   QT Interval= 416  ms   QRS Axis= 9  deg   T Wave Axis= 175  deg   - ABNORMAL ECG -   Sinus rhythm   IVCD, consider atypical LBBB   No Prior Tracing for Comparison   Electronically Signed By: Sailaja Hollis ([facility]) 22-Jan-2023 07:16:08   Date and Time of Study: 2023-01-22 00:12:56           Assessment/Plan     Active Hospital Problems    Diagnosis  POA   • **TIA (transient ischemic attack) [G45.9]  Unknown   • HTN (hypertension), benign [I10]  Yes   • DM2 (diabetes mellitus, type 2) (HCC) [E11.9]  Yes   • Hypothyroid [E03.9]  Yes   • OAB (overactive bladder) [N32.81]  Yes      Resolved Hospital Problems   No resolved problems to display.       Ms. Parisi is a 82 y.o. female with history of polio (left leg deficits), DM2, HTN, hypothyroid who presented with reported word finding difficulty and slurred speech.    TIA sx  -CTH (1/21/23): no acute intracranial findings  -Stroke consult  -MRI brain (1/22/23): Mild diffuse atrophy and chronic small vessel ischemic change; No evidence of acute infarct  -ASA, atorvastatin 80mg  -check ESR/CRP given reported jaw pain; no hx of visual changes or past history of jaw pain so lower suspicion for GCA    HTN  -Home meds: Losartan 75 mg, atenolol 25  mg twice daily, HCTZ 12.5 mg, Lasix 20 mg  -Resumed losartan, atenolol; resume others as needed     DM2  -A1c 8.4 (1/22/23)  -home meds: Metformin 500 mg daily  -could benefit from SGLT2i/GLP-1 given obesity  -SSI while IP    Hypothyroid  -TSH 1.16 (1/2021); recheck  -Synthroid 50 mcg    · I discussed the patient's findings and my recommendations with patient.    VTE Prophylaxis - SCDs.  Code Status - Full code.  Dispo - return to nursing home when cleared by consultants       Raad Cox MD  [facility]  01/22/23  12:27 EST

## 2023-01-23 ENCOUNTER — APPOINTMENT (OUTPATIENT)
Dept: CT IMAGING | Facility: HOSPITAL | Age: 83
End: 2023-01-23
Payer: MEDICARE

## 2023-01-23 LAB
ANION GAP SERPL CALCULATED.3IONS-SCNC: 6 MMOL/L (ref 5–15)
BUN SERPL-MCNC: 15 MG/DL (ref 8–23)
BUN/CREAT SERPL: 32.6 (ref 7–25)
CALCIUM SPEC-SCNC: 8.3 MG/DL (ref 8.6–10.5)
CHLORIDE SERPL-SCNC: 110 MMOL/L (ref 98–107)
CO2 SERPL-SCNC: 24 MMOL/L (ref 22–29)
CREAT SERPL-MCNC: 0.46 MG/DL (ref 0.57–1)
DEPRECATED RDW RBC AUTO: 39.4 FL (ref 37–54)
EGFRCR SERPLBLD CKD-EPI 2021: 95.7 ML/MIN/1.73
ERYTHROCYTE [DISTWIDTH] IN BLOOD BY AUTOMATED COUNT: 11.7 % (ref 12.3–15.4)
GLUCOSE BLDC GLUCOMTR-MCNC: 133 MG/DL (ref 70–130)
GLUCOSE BLDC GLUCOMTR-MCNC: 139 MG/DL (ref 70–130)
GLUCOSE BLDC GLUCOMTR-MCNC: 162 MG/DL (ref 70–130)
GLUCOSE BLDC GLUCOMTR-MCNC: 191 MG/DL (ref 70–130)
GLUCOSE SERPL-MCNC: 137 MG/DL (ref 65–99)
HCT VFR BLD AUTO: 36 % (ref 34–46.6)
HGB BLD-MCNC: 12.2 G/DL (ref 12–15.9)
MCH RBC QN AUTO: 30.8 PG (ref 26.6–33)
MCHC RBC AUTO-ENTMCNC: 33.9 G/DL (ref 31.5–35.7)
MCV RBC AUTO: 90.9 FL (ref 79–97)
PLATELET # BLD AUTO: 181 10*3/MM3 (ref 140–450)
PMV BLD AUTO: 11.8 FL (ref 6–12)
POTASSIUM SERPL-SCNC: 4.2 MMOL/L (ref 3.5–5.2)
RBC # BLD AUTO: 3.96 10*6/MM3 (ref 3.77–5.28)
SODIUM SERPL-SCNC: 140 MMOL/L (ref 136–145)
WBC NRBC COR # BLD: 9.31 10*3/MM3 (ref 3.4–10.8)

## 2023-01-23 PROCEDURE — 80048 BASIC METABOLIC PNL TOTAL CA: CPT | Performed by: STUDENT IN AN ORGANIZED HEALTH CARE EDUCATION/TRAINING PROGRAM

## 2023-01-23 PROCEDURE — 0 IOPAMIDOL PER 1 ML: Performed by: HOSPITALIST

## 2023-01-23 PROCEDURE — G0378 HOSPITAL OBSERVATION PER HR: HCPCS

## 2023-01-23 PROCEDURE — 70498 CT ANGIOGRAPHY NECK: CPT

## 2023-01-23 PROCEDURE — 70496 CT ANGIOGRAPHY HEAD: CPT

## 2023-01-23 PROCEDURE — 99214 OFFICE O/P EST MOD 30 MIN: CPT | Performed by: NURSE PRACTITIONER

## 2023-01-23 PROCEDURE — 85027 COMPLETE CBC AUTOMATED: CPT | Performed by: STUDENT IN AN ORGANIZED HEALTH CARE EDUCATION/TRAINING PROGRAM

## 2023-01-23 PROCEDURE — 63710000001 INSULIN LISPRO (HUMAN) PER 5 UNITS: Performed by: NURSE PRACTITIONER

## 2023-01-23 PROCEDURE — 82962 GLUCOSE BLOOD TEST: CPT

## 2023-01-23 PROCEDURE — 92526 ORAL FUNCTION THERAPY: CPT

## 2023-01-23 RX ORDER — NYSTATIN 100000 [USP'U]/G
POWDER TOPICAL EVERY 12 HOURS SCHEDULED
Status: DISCONTINUED | OUTPATIENT
Start: 2023-01-23 | End: 2023-01-24 | Stop reason: HOSPADM

## 2023-01-23 RX ADMIN — INSULIN LISPRO 2 UNITS: 100 INJECTION, SOLUTION INTRAVENOUS; SUBCUTANEOUS at 13:18

## 2023-01-23 RX ADMIN — ATORVASTATIN CALCIUM 80 MG: 80 TABLET, FILM COATED ORAL at 20:36

## 2023-01-23 RX ADMIN — NYSTATIN: 100000 POWDER TOPICAL at 20:36

## 2023-01-23 RX ADMIN — IOPAMIDOL 95 ML: 755 INJECTION, SOLUTION INTRAVENOUS at 18:56

## 2023-01-23 RX ADMIN — Medication 10 ML: at 20:36

## 2023-01-23 RX ADMIN — ATENOLOL 25 MG: 25 TABLET ORAL at 09:42

## 2023-01-23 RX ADMIN — OXYBUTYNIN CHLORIDE 5 MG: 5 TABLET ORAL at 20:36

## 2023-01-23 RX ADMIN — ASPIRIN 325 MG: 325 TABLET ORAL at 09:42

## 2023-01-23 RX ADMIN — OXYBUTYNIN CHLORIDE 5 MG: 5 TABLET ORAL at 09:42

## 2023-01-23 RX ADMIN — FUROSEMIDE 20 MG: 20 TABLET ORAL at 09:42

## 2023-01-23 RX ADMIN — ATENOLOL 25 MG: 25 TABLET ORAL at 20:36

## 2023-01-23 RX ADMIN — LEVOTHYROXINE SODIUM 50 MCG: 0.05 TABLET ORAL at 09:42

## 2023-01-23 RX ADMIN — LOSARTAN POTASSIUM 50 MG: 50 TABLET, FILM COATED ORAL at 09:42

## 2023-01-23 RX ADMIN — CITALOPRAM 10 MG: 10 TABLET, FILM COATED ORAL at 09:42

## 2023-01-23 RX ADMIN — Medication 10 ML: at 09:43

## 2023-01-23 RX ADMIN — NYSTATIN: 100000 POWDER TOPICAL at 18:49

## 2023-01-23 RX ADMIN — OXYBUTYNIN CHLORIDE 5 MG: 5 TABLET ORAL at 18:47

## 2023-01-23 NOTE — PAYOR COMM NOTE
"Alice Parisi (82 y.o. Female)     PLEASE SEE ATTACHED FOR OBSERVATION AUTH        PLEASE CALL JOSÉ @ [phone] OR [phone]    THANK YOU   JOSÉ PERRY RN/[facility]  PLEASE SEE ATTACHED FOR OBSERVATION AUTH    REF #       PLEASE CALL JOSÉ @ [phone] OR [phone]    THANK YOU   JOSÉ PERRY RN/[facility]      Date of Birth   1940    Social Security Number       Address   [address] [place] [address]    Home Phone   [phone]    MRN   [id number]       Jew   [facility]    Marital Status                               Admission Date   1/22/23    Admission Type   Emergency    Admitting Provider   Raad Cox MD    Attending Provider   Bhaskar Pena MD    Department, Room/Bed   [address], S515/1       Discharge Date       Discharge Disposition       Discharge Destination                               Attending Provider: Bhaskar Pena MD    Allergies: Shellfish-derived Products    Isolation: None   Infection: None   Code Status: CPR    Ht: 160 cm (63\")   Wt: 103 kg (227 lb)    Admission Cmt: None   Principal Problem: TIA (transient ischemic attack) [G45.9]                 Active Insurance as of 1/22/2023     Primary Coverage     Payor Plan Insurance Group Employer/Plan Group    SIGNATURE MEDICARE ADVANTAGE SIGNATURE ADVANTAGE NGN     Payor Plan Address Payor Plan Phone Number Payor Plan Fax Number Effective Dates    [address] [address] [phone]  1/2/2023 - None Entered    [facility] [id number]       Subscriber Name Subscriber Birth Date Member ID       ALICE PARISI 1940 [id number]                 Emergency Contacts      (Rel.) Home Phone Work Phone Mobile Phone    Roman Parisi (Son) [phone] -- --    Cristina Parisi (Other) [phone] -- --            [place]: NPI [id number]  Tax ID [id number]     History & Physical      Raad Cox MD at " "01/22/23 0730              Patient Name:  Alice Parisi  YOB: 1940  MRN:  [id number]  Admit Date:  1/22/2023  Patient Care Team:  Geronimo Fischer MD as PCP - General (Internal Medicine)      Subjective    History Present Illness     Chief Complaint   Patient presents with   • Speech Problem       Ms. Parisi is a 82 y.o. female with history of polio (left leg deficits), DM2, HTN, hypothyroid who presented with reported word finding difficulty and slurred speech.  She reportedly took her home meds around 9 PM last night, when she felt \"drunk.\"  She has trouble recalling episode, though states that she just felt that she could not talk.  Denied any weakness or numbness/tingling.  No vision changes.  She does note that she has some jaw pain this morning, though denies any history of intermittent claudication.  She reports being back to her baseline this morning.  Denies fevers, chills, chest pain, shortness of breath, N/V/D.    History of Present Illness  Review of Systems   Constitutional: Negative for activity change, appetite change, diaphoresis and fatigue.   HENT: Negative for congestion, ear discharge, mouth sores and tinnitus.    Eyes: Negative for pain, discharge and redness.   Respiratory: Negative for cough, choking, chest tightness, shortness of breath and wheezing.    Cardiovascular: Negative for chest pain and palpitations.   Gastrointestinal: Negative for abdominal pain, constipation, diarrhea and nausea.   Endocrine: Negative for cold intolerance and heat intolerance.   Genitourinary: Negative for flank pain, frequency, hematuria, urgency and vaginal discharge.   Musculoskeletal: Negative for back pain, joint swelling and neck pain.   Skin: Negative for color change and rash.   Neurological: Negative for syncope, speech difficulty and headaches.   Psychiatric/Behavioral: Negative for agitation, behavioral problems and hallucinations.        Personal History     Past Medical History: "   Diagnosis Date   • Arthritis    • Depression    • Diabetes mellitus (HCC)    • Hyperlipidemia    • Hypertension    • Incontinence    • Insomnia    • Obesity    • Postpolio syndrome    • Sleep apnea    • UTI (urinary tract infection)      Past Surgical History:   Procedure Laterality Date   • APPENDECTOMY     • CATARACT EXTRACTION     • EYELID LACERATION REPAIR     • GALLBLADDER SURGERY     • HYSTERECTOMY     • KNEE SURGERY     • LUMBAR DISC SURGERY       Family History   Problem Relation Age of Onset   • Diabetes Mother    • Diabetes Brother    • Heart disease Brother      Social History     Tobacco Use   • Smoking status: Never   • Smokeless tobacco: Never   Vaping Use   • Vaping Use: Never used   Substance Use Topics   • Alcohol use: No   • Drug use: Never     No current facility-administered medications on file prior to encounter.     Current Outpatient Medications on File Prior to Encounter   Medication Sig Dispense Refill   • ACCU-CHEK PARRIS PLUS test strip USE TO TEST TWICE A DAY DX: TYPE 2 DIABETES MELLITUS ICD 10: E11.9 300 each 3   • Accu-Chek FastClix Lancets misc USE TO TEST TWICE A  each 3   • Blood Glucose Monitoring Suppl (ACCU-CHEK PARRIS PLUS) w/Device kit Use to test twice a Day for Type 2 Diabetes ICD 10 E11.9 Pt's machine broke this is a replacement machine 1 kit 0   • citalopram (CeleXA) 10 MG tablet Take 1 tablet by mouth Daily. 90 tablet 3   • furosemide (LASIX) 20 MG tablet Take 1 tablet by mouth Daily. 90 tablet 3   • HYDROcodone-acetaminophen (NORCO) 5-325 MG per tablet Take 1 tablet by mouth Every 6 (Six) Hours As Needed for Mild Pain or Moderate Pain.     • levothyroxine (SYNTHROID, LEVOTHROID) 50 MCG tablet Take 1 tablet by mouth Daily. 90 tablet 3   • losartan (COZAAR) 50 MG tablet Take 1 tablet by mouth Daily. (Patient taking differently: Take 75 mg by mouth Daily.) 90 tablet 1   • melatonin 1 MG tablet Take 2 mg by mouth At Night As Needed for Sleep.     • metFORMIN  (GLUCOPHAGE) 500 MG tablet Take 1 tablet by mouth Daily With Breakfast. 90 tablet 3   • mupirocin (BACTROBAN) 2 % ointment Apply 1 application topically to the appropriate area as directed Daily. Apply to open area on left thigh     • oxybutynin (DITROPAN) 5 MG tablet Take 5 mg by mouth 3 (Three) Times a Day.     • simvastatin (ZOCOR) 40 MG tablet Take 1 tablet by mouth Every Night. (Patient taking differently: Take 20 mg by mouth Every Night.) 90 tablet 3   • atenolol (TENORMIN) 25 MG tablet Take 1 tablet by mouth 2 (Two) Times a Day. 180 tablet 3   • hydroCHLOROthiazide (HYDRODIURIL) 12.5 MG tablet Take 1 tablet by mouth Daily. 90 tablet 1   • Hydrocortisone (Geena's magic butt cream) Apply 1 application topically to the appropriate area as directed As Needed (infection/discomfort). 60 g 0   • meloxicam (Mobic) 7.5 MG tablet Take 1 tablet by mouth Daily. 14 tablet 0   • nystatin (MYCOSTATIN) 989946 UNIT/GM cream Apply 1 application topically to the appropriate area as directed As Needed.       Allergies   Allergen Reactions   • Shellfish-Derived Products Swelling       Objective     Objective     Vital Signs  Temp:  [97.6 °F (36.4 °C)-98 °F (36.7 °C)] 98 °F (36.7 °C)  Heart Rate:  [61-74] 65  Resp:  [16-18] 18  BP: (124-154)/(62-85) 154/62  SpO2:  [95 %-98 %] 97 %  on   ;   Device (Oxygen Therapy): room air  Body mass index is 40.3 kg/m².    Physical Exam  Constitutional:       General: She is not in acute distress.     Appearance: Normal appearance. She is not toxic-appearing.   Eyes:      Extraocular Movements: Extraocular movements intact.      Pupils: Pupils are equal, round, and reactive to light.   Cardiovascular:      Rate and Rhythm: Normal rate and regular rhythm.      Pulses: Normal pulses.      Heart sounds: Normal heart sounds. No murmur heard.    No gallop.   Pulmonary:      Effort: Pulmonary effort is normal. No respiratory distress.      Breath sounds: Normal breath sounds. No wheezing.   Abdominal:       General: Abdomen is flat. There is no distension.      Palpations: Abdomen is soft.      Tenderness: There is no abdominal tenderness.   Musculoskeletal:      Right lower leg: No edema.      Left lower leg: No edema.      Comments: Left lower extremity 4/5 strength, chronic weakness secondary to polio infection   Skin:     General: Skin is warm.      Coloration: Skin is not jaundiced.   Neurological:      General: No focal deficit present.      Mental Status: She is alert.      Comments: Mild dysarthria, baseline per patient   Psychiatric:         Mood and Affect: Mood normal.       Results Review:    I have personally reviewed:  [x]  Laboratory  [x]  Old records  [x]  Radiology    [x]  EKG/Telemetry   []  Microbiology    []  Cardiology/Vascular   []  Pathology     Lab Results (last 24 hours)     Procedure Component Value Units Date/Time    CBC & Differential [[id number]]  (Abnormal) Collected: 01/22/23 0024    Specimen: Blood from Arm, Right Updated: 01/22/23 0035    Narrative:      The following orders were created for panel order CBC & Differential.  Procedure                               Abnormality         Status                     ---------                               -----------         ------                     CBC Auto Differential[[id number]]        Abnormal            Final result                 Please view results for these tests on the individual orders.    Comprehensive Metabolic Panel [[id number]]  (Abnormal) Collected: 01/22/23 0024    Specimen: Blood from Arm, Right Updated: 01/22/23 0055     Glucose 199 mg/dL      BUN 18 mg/dL      Creatinine 0.62 mg/dL      Sodium 139 mmol/L      Potassium 4.0 mmol/L      Chloride 102 mmol/L      CO2 26.8 mmol/L      Calcium 10.1 mg/dL      Total Protein 6.8 g/dL      Albumin 3.8 g/dL      ALT (SGPT) 25 U/L      AST (SGOT) 22 U/L      Alkaline Phosphatase 83 U/L      Total Bilirubin 0.3 mg/dL      Globulin 3.0 gm/dL      A/G Ratio 1.3 g/dL       BUN/Creatinine Ratio 29.0     Anion Gap 10.2 mmol/L      eGFR 89.0 mL/min/1.73     Narrative:      GFR Normal >60  Chronic Kidney Disease <60  Kidney Failure <15    The GFR formula is only valid for adults with stable renal function between ages 18 and 70.    Protime-INR [[id number]]  (Normal) Collected: 01/22/23 0024    Specimen: Blood from Arm, Right Updated: 01/22/23 0052     Protime 13.4 Seconds      INR 1.01    aPTT [[id number]]  (Normal) Collected: 01/22/23 0024    Specimen: Blood from Arm, Right Updated: 01/22/23 0052     PTT 29.7 seconds     Troponin [[id number]]  (Normal) Collected: 01/22/23 0024    Specimen: Blood from Arm, Right Updated: 01/22/23 0055     Troponin T <0.010 ng/mL     Narrative:      Troponin T Reference Range:  <= 0.03 ng/mL-   Negative for AMI  >0.03 ng/mL-     Abnormal for myocardial necrosis.  Clinicians would have to utilize clinical acumen, EKG, Troponin and serial changes to determine if it is an Acute Myocardial Infarction or myocardial injury due to an underlying chronic condition.       Results may be falsely decreased if patient taking Biotin.      CBC Auto Differential [[id number]]  (Abnormal) Collected: 01/22/23 0024    Specimen: Blood from Arm, Right Updated: 01/22/23 0035     WBC 10.26 10*3/mm3      RBC 4.58 10*6/mm3      Hemoglobin 14.3 g/dL      Hematocrit 41.5 %      MCV 90.6 fL      MCH 31.2 pg      MCHC 34.5 g/dL      RDW 11.9 %      RDW-SD 39.6 fl      MPV 11.4 fL      Platelets 193 10*3/mm3      Neutrophil % 68.1 %      Lymphocyte % 22.2 %      Monocyte % 7.0 %      Eosinophil % 2.0 %      Basophil % 0.5 %      Immature Grans % 0.2 %      Neutrophils, Absolute 6.98 10*3/mm3      Lymphocytes, Absolute 2.28 10*3/mm3      Monocytes, Absolute 0.72 10*3/mm3      Eosinophils, Absolute 0.21 10*3/mm3      Basophils, Absolute 0.05 10*3/mm3      Immature Grans, Absolute 0.02 10*3/mm3      nRBC 0.0 /100 WBC     Urinalysis With Microscopic If Indicated (No Culture) - Straight Cath  [[id number]]  (Abnormal) Collected: 01/22/23 0141    Specimen: Urine from Straight Cath Updated: 01/22/23 0158     Color, UA Yellow     Appearance, UA Clear     pH, UA 5.5     Specific Gravity, UA 1.007     Glucose, UA Negative     Ketones, UA Negative     Bilirubin, UA Negative     Blood, UA Negative     Protein, UA Negative     Leuk Esterase, UA Trace     Nitrite, UA Negative     Urobilinogen, UA 0.2 E.U./dL    Urinalysis, Microscopic Only - Straight Cath [[id number]]  (Abnormal) Collected: 01/22/23 0141    Specimen: Urine from Straight Cath Updated: 01/22/23 0231     RBC, UA None Seen /HPF      WBC, UA 0-2 /HPF      Bacteria, UA 3+ /HPF      Squamous Epithelial Cells, UA 0-2 /HPF      Hyaline Casts, UA None Seen /LPF      Methodology Manual Light Microscopy    Troponin [[id number]]  (Normal) Collected: 01/22/23 0159    Specimen: Blood Updated: 01/22/23 0225     Troponin T <0.010 ng/mL     Narrative:      Troponin T Reference Range:  <= 0.03 ng/mL-   Negative for AMI  >0.03 ng/mL-     Abnormal for myocardial necrosis.  Clinicians would have to utilize clinical acumen, EKG, Troponin and serial changes to determine if it is an Acute Myocardial Infarction or myocardial injury due to an underlying chronic condition.       Results may be falsely decreased if patient taking Biotin.      POC Glucose Once [[id number]]  (Abnormal) Collected: 01/22/23 0345    Specimen: Blood Updated: 01/22/23 0346     Glucose 163 mg/dL      Comment: Meter: [id number] : [id number] Yani Sylvester RN       Hemoglobin A1c [[id number]]  (Abnormal) Collected: 01/22/23 0448    Specimen: Blood Updated: 01/22/23 0520     Hemoglobin A1C 8.40 %     Narrative:      Hemoglobin A1C Ranges:    Increased Risk for Diabetes  5.7% to 6.4%  Diabetes                     >= 6.5%  Diabetic Goal                < 7.0%    Lipid Panel [[id number]] Collected: 01/22/23 0448    Specimen: Blood Updated: 01/22/23 0527     Total Cholesterol 151 mg/dL      Triglycerides  113 mg/dL      HDL Cholesterol 56 mg/dL      LDL Cholesterol  75 mg/dL      VLDL Cholesterol 20 mg/dL      LDL/HDL Ratio 1.29    Narrative:      Cholesterol Reference Ranges  (U.S. Department of Health and Human Services ATP III Classifications)    Desirable          <200 mg/dL  Borderline High    200-239 mg/dL  High Risk          >240 mg/dL      Triglyceride Reference Ranges  (U.S. Department of Health and Human Services ATP III Classifications)    Normal           <150 mg/dL  Borderline High  150-199 mg/dL  High             200-499 mg/dL  Very High        >500 mg/dL    HDL Reference Ranges  (U.S. Department of Health and Human Services ATP III Classifications)    Low     <40 mg/dl (major risk factor for CHD)  High    >60 mg/dl ('negative' risk factor for CHD)        LDL Reference Ranges  (U.S. Department of Health and Human Services ATP III Classifications)    Optimal          <100 mg/dL  Near Optimal     100-129 mg/dL  Borderline High  130-159 mg/dL  High             160-189 mg/dL  Very High        >189 mg/dL    Basic Metabolic Panel [[id number]]  (Abnormal) Collected: 01/22/23 0448    Specimen: Blood Updated: 01/22/23 0527     Glucose 169 mg/dL      BUN 16 mg/dL      Creatinine 0.52 mg/dL      Sodium 143 mmol/L      Potassium 3.9 mmol/L      Chloride 106 mmol/L      CO2 28.3 mmol/L      Calcium 9.6 mg/dL      BUN/Creatinine Ratio 30.8     Anion Gap 8.7 mmol/L      eGFR 92.9 mL/min/1.73     Narrative:      GFR Normal >60  Chronic Kidney Disease <60  Kidney Failure <15    The GFR formula is only valid for adults with stable renal function between ages 18 and 70.    CBC Auto Differential [[id number]]  (Abnormal) Collected: 01/22/23 0448    Specimen: Blood Updated: 01/22/23 0506     WBC 9.92 10*3/mm3      RBC 4.09 10*6/mm3      Hemoglobin 13.0 g/dL      Hematocrit 37.7 %      MCV 92.2 fL      MCH 31.8 pg      MCHC 34.5 g/dL      RDW 12.1 %      RDW-SD 40.8 fl      MPV 11.6 fL      Platelets 179 10*3/mm3       Neutrophil % 59.2 %      Lymphocyte % 28.5 %      Monocyte % 9.3 %      Eosinophil % 2.2 %      Basophil % 0.6 %      Immature Grans % 0.2 %      Neutrophils, Absolute 5.87 10*3/mm3      Lymphocytes, Absolute 2.83 10*3/mm3      Monocytes, Absolute 0.92 10*3/mm3      Eosinophils, Absolute 0.22 10*3/mm3      Basophils, Absolute 0.06 10*3/mm3      Immature Grans, Absolute 0.02 10*3/mm3      nRBC 0.1 /100 WBC     POC Glucose Once [[id number]]  (Abnormal) Collected: 01/22/23 0611    Specimen: Blood Updated: 01/22/23 0612     Glucose 138 mg/dL      Comment: Meter: [id number] : [id number] Kpintchame Sibite NA       POC Glucose Once [[id number]]  (Normal) Collected: 01/22/23 1131    Specimen: Blood Updated: 01/22/23 1135     Glucose 130 mg/dL      Comment: Meter: [id number] : [id number] Rankin Riccardo NA             Imaging Results (Last 24 Hours)     Procedure Component Value Units Date/Time    MRI Brain Without Contrast [[id number]] Collected: 01/22/23 1053     Updated: 01/22/23 1058    Narrative:      MR SCAN OF THE BRAIN WITHOUT CONTRAST     HISTORY: Episode of slurred speech.     The MR scan was performed with sagittal and axial images without  contrast. There is mild diffuse atrophy and chronic small vessel  ischemic change. There is no evidence of intracranial hemorrhage or mass  effect. The diffusion sequence shows no evidence of acute infarct.     There are normal flow voids in the major vessels. The sinuses and  mastoid air cells are clear.     CONCLUSION: Mild diffuse atrophy and chronic small vessel ischemic  change. No evidence of acute infarct.     This report was finalized on 1/22/2023 10:55 AM by Dr. Sunny Wright M.D.       CT Head Without Contrast [[id number]] Collected: 01/22/23 0045     Updated: 01/22/23 0050    Narrative:      CT HEAD WITHOUT CONTRAST     HISTORY: Slurred speech     COMPARISON: None available.     TECHNIQUE: Axial CT imaging was obtained through the brain. No IV  contrast was  administered.     FINDINGS:  No acute intracranial hemorrhage is seen. There is mild thinning. There  is mucosal thickening identified within the ethmoid and right sphenoid  sinuses. Mastoid air cells are clear. There is periventricular and deep  white matter microangiopathic change. There is no midline shift or mass  effect.       Impression:      No acute intracranial findings.     Radiation dose reduction techniques were utilized, including automated  exposure control and exposure modulation based on body size.     This report was finalized on 1/22/2023 12:47 AM by Dr. Obdulia Hernandez M.D.                 ECG 12 Lead Stroke Evaluation   Final Result   HEART RATE= 77  bpm   RR Interval= 779  ms   CT Interval= 137  ms   P Horizontal Axis= -23  deg   P Front Axis= 79  deg   QRSD Interval= 141  ms   QT Interval= 416  ms   QRS Axis= 9  deg   T Wave Axis= 175  deg   - ABNORMAL ECG -   Sinus rhythm   IVCD, consider atypical LBBB   No Prior Tracing for Comparison   Electronically Signed By: Sailaja Hollis ([facility]) 22-Jan-2023 07:16:08   Date and Time of Study: 2023-01-22 00:12:56          Assessment/Plan     Active Hospital Problems    Diagnosis  POA   • **TIA (transient ischemic attack) [G45.9]  Unknown   • HTN (hypertension), benign [I10]  Yes   • DM2 (diabetes mellitus, type 2) (HCC) [E11.9]  Yes   • Hypothyroid [E03.9]  Yes   • OAB (overactive bladder) [N32.81]  Yes      Resolved Hospital Problems   No resolved problems to display.       Ms. Parisi is a 82 y.o. female with history of polio (left leg deficits), DM2, HTN, hypothyroid who presented with reported word finding difficulty and slurred speech.    TIA sx  -CTH (1/21/23): no acute intracranial findings  -Stroke consult  -MRI brain (1/22/23): Mild diffuse atrophy and chronic small vessel ischemic change; No evidence of acute infarct  -ASA, atorvastatin 80mg  -check ESR/CRP given reported jaw pain; no hx of visual changes or past history of jaw pain so lower  "suspicion for GCA    HTN  -Home meds: Losartan 75 mg, atenolol 25 mg twice daily, HCTZ 12.5 mg, Lasix 20 mg  -Resumed losartan, atenolol; resume others as needed     DM2  -A1c 8.4 (1/22/23)  -home meds: Metformin 500 mg daily  -could benefit from SGLT2i/GLP-1 given obesity  -SSI while IP    Hypothyroid  -TSH 1.16 (1/2021); recheck  -Synthroid 50 mcg    · I discussed the patient's findings and my recommendations with patient.    VTE Prophylaxis - SCDs.  Code Status - Full code.  Dispo - return to nursing home when cleared by consultants       Raad Cox MD  [facility]  01/22/23  12:27 EST      Electronically signed by Raad Cox MD at 01/22/23 1432          Emergency Department Notes      Marilu Ivory RN at 01/21/23 2204        Pt arrives from [facility] via [facility] #[id number].    EMS states \"Called out for slurred speech. She complains of Dry mouth, however is AxOx4. Negative on the CSS. No noted deficits.\"      Patient was placed in face mask during first look triage.  Patient was wearing a face mask throughout encounter.  I wore personal protective equipment throughout the encounter.  Hand hygiene was performed before and after patient encounter.          Electronically signed by Marilu Ivory RN at 01/21/23 2307     Elizabeth Moraes RN at 01/22/23 0020        While moving pt from stretcher area to triage bay to await room assignment, no slurred speech noted.    Electronically signed by Elizabeth Moraes RN at 01/22/23 0021     Miguel Snider MD at 01/22/23 0129           EMERGENCY DEPARTMENT ENCOUNTER    Room Number:  S515/1  Date of encounter:  1/22/2023  PCP: Geronimo Fischer MD  Patient Care Team:  Geronimo Fischer MD as PCP - General (Internal Medicine)   Independent Historians: Patient    HPI:  Chief Complaint: Word finding difficulty, slurred speech  A complete HPI/ROS/PMH/PSH/SH/FH are unobtainable due to: None    Chronic or social " conditions impacting patient care (social determinants of health): Patient is very hard of hearing    Context: Alice Parisi is a 82 y.o. female who presents to the ED c/o word finding difficulty and slurred speech around 9 PM.  States that her symptoms have gradually improved.  Patient states that after she took her medication IVF she began to feel like she was drunk.    Review of prior external notes (non-ED): I reviewed facility discharge summary from 1/20/2021    Review of prior external test results outside of this encounter: I have reviewed patient's CMP from 11/7/2019, and CBC from 1/3/2021    PAST MEDICAL HISTORY  Active Ambulatory Problems     Diagnosis Date Noted   • DM2 (diabetes mellitus, type 2) (HCC) 06/29/2015   • Generalized OA 06/29/2015   • HTN (hypertension), benign 11/05/2015   • Hypothyroid 06/29/2015   • Impaired mobility 06/29/2015   • Obesity 06/29/2015   • Post-polio syndrome 06/29/2015   • Hyperlipidemia 02/08/2017   • Depression 10/08/2015   • OAB (overactive bladder) 06/29/2015   • Foot pain, left 03/07/2018   • History of gout 03/07/2018   • Dependent edema 05/23/2018     Resolved Ambulatory Problems     Diagnosis Date Noted   • No Resolved Ambulatory Problems     Past Medical History:   Diagnosis Date   • Arthritis    • Diabetes mellitus (HCC)    • Hypertension    • Incontinence    • Insomnia    • Postpolio syndrome    • Sleep apnea    • UTI (urinary tract infection)        The patient qualifies to receive the vaccine, but they have not yet received it.    PAST SURGICAL HISTORY  Past Surgical History:   Procedure Laterality Date   • APPENDECTOMY     • CATARACT EXTRACTION     • EYELID LACERATION REPAIR     • GALLBLADDER SURGERY     • HYSTERECTOMY     • KNEE SURGERY     • LUMBAR DISC SURGERY           FAMILY HISTORY  Family History   Problem Relation Age of Onset   • Diabetes Mother    • Diabetes Brother    • Heart disease Brother          SOCIAL HISTORY  Social History     Socioeconomic  History   • Marital status:    Tobacco Use   • Smoking status: Never   • Smokeless tobacco: Never   Vaping Use   • Vaping Use: Never used   Substance and Sexual Activity   • Alcohol use: No   • Drug use: Never   • Sexual activity: Not Currently         ALLERGIES  Shellfish-derived products        REVIEW OF SYSTEMS  Review of Systems     All systems reviewed and negative except for those discussed in HPI.       PHYSICAL EXAM    I have reviewed the triage vital signs and nursing notes.    ED Triage Vitals [01/21/23 2206]   Temp Heart Rate Resp BP SpO2   97.6 °F (36.4 °C) 64 16 141/73 98 %      Temp src Heart Rate Source Patient Position BP Location FiO2 (%)   Oral Monitor Lying Right arm --       Physical Exam  GENERAL: alert, no acute distress  SKIN: Warm, dry  HENT: Normocephalic, atraumatic  EYES: no scleral icterus  CV: regular rhythm, regular rate  RESPIRATORY: normal effort, lungs clear  ABDOMEN: soft, nontender, nondistended  MUSCULOSKELETAL: no deformity  NEURO: alert, moves all extremities, follows commands          LAB RESULTS  Recent Results (from the past 24 hour(s))   ECG 12 Lead Stroke Evaluation    Collection Time: 01/22/23 12:12 AM   Result Value Ref Range    QT Interval 416 ms   Comprehensive Metabolic Panel    Collection Time: 01/22/23 12:24 AM    Specimen: Arm, Right; Blood   Result Value Ref Range    Glucose 199 (H) 65 - 99 mg/dL    BUN 18 8 - 23 mg/dL    Creatinine 0.62 0.57 - 1.00 mg/dL    Sodium 139 136 - 145 mmol/L    Potassium 4.0 3.5 - 5.2 mmol/L    Chloride 102 98 - 107 mmol/L    CO2 26.8 22.0 - 29.0 mmol/L    Calcium 10.1 8.6 - 10.5 mg/dL    Total Protein 6.8 6.0 - 8.5 g/dL    Albumin 3.8 3.5 - 5.2 g/dL    ALT (SGPT) 25 1 - 33 U/L    AST (SGOT) 22 1 - 32 U/L    Alkaline Phosphatase 83 39 - 117 U/L    Total Bilirubin 0.3 0.0 - 1.2 mg/dL    Globulin 3.0 gm/dL    A/G Ratio 1.3 g/dL    BUN/Creatinine Ratio 29.0 (H) 7.0 - 25.0    Anion Gap 10.2 5.0 - 15.0 mmol/L    eGFR 89.0 >60.0  mL/min/1.73   Protime-INR    Collection Time: 01/22/23 12:24 AM    Specimen: Arm, Right; Blood   Result Value Ref Range    Protime 13.4 11.7 - 14.2 Seconds    INR 1.01 0.90 - 1.10   aPTT    Collection Time: 01/22/23 12:24 AM    Specimen: Arm, Right; Blood   Result Value Ref Range    PTT 29.7 22.7 - 35.4 seconds   Troponin    Collection Time: 01/22/23 12:24 AM    Specimen: Arm, Right; Blood   Result Value Ref Range    Troponin T <0.010 0.000 - 0.030 ng/mL   CBC Auto Differential    Collection Time: 01/22/23 12:24 AM    Specimen: Arm, Right; Blood   Result Value Ref Range    WBC 10.26 3.40 - 10.80 10*3/mm3    RBC 4.58 3.77 - 5.28 10*6/mm3    Hemoglobin 14.3 12.0 - 15.9 g/dL    Hematocrit 41.5 34.0 - 46.6 %    MCV 90.6 79.0 - 97.0 fL    MCH 31.2 26.6 - 33.0 pg    MCHC 34.5 31.5 - 35.7 g/dL    RDW 11.9 (L) 12.3 - 15.4 %    RDW-SD 39.6 37.0 - 54.0 fl    MPV 11.4 6.0 - 12.0 fL    Platelets 193 140 - 450 10*3/mm3    Neutrophil % 68.1 42.7 - 76.0 %    Lymphocyte % 22.2 19.6 - 45.3 %    Monocyte % 7.0 5.0 - 12.0 %    Eosinophil % 2.0 0.3 - 6.2 %    Basophil % 0.5 0.0 - 1.5 %    Immature Grans % 0.2 0.0 - 0.5 %    Neutrophils, Absolute 6.98 1.70 - 7.00 10*3/mm3    Lymphocytes, Absolute 2.28 0.70 - 3.10 10*3/mm3    Monocytes, Absolute 0.72 0.10 - 0.90 10*3/mm3    Eosinophils, Absolute 0.21 0.00 - 0.40 10*3/mm3    Basophils, Absolute 0.05 0.00 - 0.20 10*3/mm3    Immature Grans, Absolute 0.02 0.00 - 0.05 10*3/mm3    nRBC 0.0 0.0 - 0.2 /100 WBC   Urinalysis With Microscopic If Indicated (No Culture) - Straight Cath    Collection Time: 01/22/23  1:41 AM    Specimen: Straight Cath; Urine   Result Value Ref Range    Color, UA Yellow Yellow, Straw    Appearance, UA Clear Clear    pH, UA 5.5 5.0 - 8.0    Specific Gravity, UA 1.007 1.005 - 1.030    Glucose, UA Negative Negative    Ketones, UA Negative Negative    Bilirubin, UA Negative Negative    Blood, UA Negative Negative    Protein, UA Negative Negative    Leuk Esterase, UA Trace  (A) Negative    Nitrite, UA Negative Negative    Urobilinogen, UA 0.2 E.U./dL 0.2 - 1.0 E.U./dL   Urinalysis, Microscopic Only - Straight Cath    Collection Time: 01/22/23  1:41 AM    Specimen: Straight Cath; Urine   Result Value Ref Range    RBC, UA None Seen None Seen, 0-2 /HPF    WBC, UA 0-2 None Seen, 0-2 /HPF    Bacteria, UA 3+ (A) None Seen /HPF    Squamous Epithelial Cells, UA 0-2 None Seen, 0-2 /HPF    Hyaline Casts, UA None Seen None Seen /LPF    Methodology Manual Light Microscopy    Troponin    Collection Time: 01/22/23  1:59 AM    Specimen: Blood   Result Value Ref Range    Troponin T <0.010 0.000 - 0.030 ng/mL   POC Glucose Once    Collection Time: 01/22/23  3:45 AM    Specimen: Blood   Result Value Ref Range    Glucose 163 (H) 70 - 130 mg/dL   Hemoglobin A1c    Collection Time: 01/22/23  4:48 AM    Specimen: Blood   Result Value Ref Range    Hemoglobin A1C 8.40 (H) 4.80 - 5.60 %   Lipid Panel    Collection Time: 01/22/23  4:48 AM    Specimen: Blood   Result Value Ref Range    Total Cholesterol 151 0 - 200 mg/dL    Triglycerides 113 0 - 150 mg/dL    HDL Cholesterol 56 40 - 60 mg/dL    LDL Cholesterol  75 0 - 100 mg/dL    VLDL Cholesterol 20 5 - 40 mg/dL    LDL/HDL Ratio 1.29    Basic Metabolic Panel    Collection Time: 01/22/23  4:48 AM    Specimen: Blood   Result Value Ref Range    Glucose 169 (H) 65 - 99 mg/dL    BUN 16 8 - 23 mg/dL    Creatinine 0.52 (L) 0.57 - 1.00 mg/dL    Sodium 143 136 - 145 mmol/L    Potassium 3.9 3.5 - 5.2 mmol/L    Chloride 106 98 - 107 mmol/L    CO2 28.3 22.0 - 29.0 mmol/L    Calcium 9.6 8.6 - 10.5 mg/dL    BUN/Creatinine Ratio 30.8 (H) 7.0 - 25.0    Anion Gap 8.7 5.0 - 15.0 mmol/L    eGFR 92.9 >60.0 mL/min/1.73   CBC Auto Differential    Collection Time: 01/22/23  4:48 AM    Specimen: Blood   Result Value Ref Range    WBC 9.92 3.40 - 10.80 10*3/mm3    RBC 4.09 3.77 - 5.28 10*6/mm3    Hemoglobin 13.0 12.0 - 15.9 g/dL    Hematocrit 37.7 34.0 - 46.6 %    MCV 92.2 79.0 - 97.0 fL     MCH 31.8 26.6 - 33.0 pg    MCHC 34.5 31.5 - 35.7 g/dL    RDW 12.1 (L) 12.3 - 15.4 %    RDW-SD 40.8 37.0 - 54.0 fl    MPV 11.6 6.0 - 12.0 fL    Platelets 179 140 - 450 10*3/mm3    Neutrophil % 59.2 42.7 - 76.0 %    Lymphocyte % 28.5 19.6 - 45.3 %    Monocyte % 9.3 5.0 - 12.0 %    Eosinophil % 2.2 0.3 - 6.2 %    Basophil % 0.6 0.0 - 1.5 %    Immature Grans % 0.2 0.0 - 0.5 %    Neutrophils, Absolute 5.87 1.70 - 7.00 10*3/mm3    Lymphocytes, Absolute 2.83 0.70 - 3.10 10*3/mm3    Monocytes, Absolute 0.92 (H) 0.10 - 0.90 10*3/mm3    Eosinophils, Absolute 0.22 0.00 - 0.40 10*3/mm3    Basophils, Absolute 0.06 0.00 - 0.20 10*3/mm3    Immature Grans, Absolute 0.02 0.00 - 0.05 10*3/mm3    nRBC 0.1 0.0 - 0.2 /100 WBC   POC Glucose Once    Collection Time: 01/22/23  6:11 AM    Specimen: Blood   Result Value Ref Range    Glucose 138 (H) 70 - 130 mg/dL       Ordered the above labs and independently reviewed the results.        RADIOLOGY  CT Head Without Contrast    Result Date: 1/22/2023  CT HEAD WITHOUT CONTRAST  HISTORY: Slurred speech  COMPARISON: None available.  TECHNIQUE: Axial CT imaging was obtained through the brain. No IV contrast was administered.  FINDINGS: No acute intracranial hemorrhage is seen. There is mild thinning. There is mucosal thickening identified within the ethmoid and right sphenoid sinuses. Mastoid air cells are clear. There is periventricular and deep white matter microangiopathic change. There is no midline shift or mass effect.      No acute intracranial findings.  Radiation dose reduction techniques were utilized, including automated exposure control and exposure modulation based on body size.  This report was finalized on 1/22/2023 12:47 AM by Dr. Obdulia Hernandez M.D.        I ordered the above noted radiological studies. Reviewed by me and discussed with radiologist.  See dictation for official radiology interpretation.      PROCEDURES    Procedures      MEDICATIONS GIVEN IN ER    Medications    sodium chloride 0.9 % flush 10 mL (has no administration in time range)   sodium chloride 0.9 % flush 10 mL (10 mL Intravenous Given 1/22/23 0326)   sodium chloride 0.9 % flush 10 mL (has no administration in time range)   sodium chloride 0.9 % infusion 40 mL (has no administration in time range)   atorvastatin (LIPITOR) tablet 80 mg (80 mg Oral Given 1/22/23 0343)   dextrose (GLUTOSE) oral gel 15 g (has no administration in time range)   dextrose (D50W) (25 g/50 mL) IV injection 25 g (has no administration in time range)   glucagon (human recombinant) (GLUCAGEN DIAGNOSTIC) injection 1 mg (has no administration in time range)   sodium chloride 0.9 % infusion (100 mL/hr Intravenous Currently Infusing 1/22/23 0613)   acetaminophen (TYLENOL) tablet 650 mg (has no administration in time range)     Or   acetaminophen (TYLENOL) 160 MG/5ML solution 650 mg (has no administration in time range)     Or   acetaminophen (TYLENOL) suppository 650 mg (has no administration in time range)   insulin lispro (ADMELOG) injection 0-7 Units (0 Units Subcutaneous Not Given 1/22/23 0630)   ondansetron (ZOFRAN) injection 4 mg (has no administration in time range)   aspirin tablet 325 mg (has no administration in time range)     Or   aspirin suppository 300 mg (has no administration in time range)         PROGRESS, DATA ANALYSIS, CONSULTS, AND MEDICAL DECISION MAKING    All labs have been independently reviewed by me.  All radiology studies have been reviewed by me and discussed with radiologist dictating the report.   EKG's independently viewed and interpreted by me.  Discussion below represents my analysis of pertinent findings related to patient's condition, differential diagnosis, treatment plan and final disposition.    My differential diagnosis includes but is not limited to:  Hypoglycemia, hyperglycemia, DKA, overdose, ethanol intoxication, thiamine deficiency, niacin deficiency, hypothymia, hyperviscosity, Brijesh’s disease,  hyponatremia, hypernatremia, liver failure, kidney failure, hyper or hypothyroid, no insufficiency, hypoxia, hypercarbia, carbon monoxide poisoning, postanoxic encephalopathy, ischemic stroke, intracranial bleed, subarachnoid hemorrhage, brain tumor, closed head injury, epidural hematoma, epidural hematoma, seizure activity, postictal state, syncopal episode, disseminated encephalomyelitis, central pontine myelinolysis, post cardiac arrest, bacterial meningitis, viral meningitis, fungal meningitis, encephalitis, brain abscess, subdural empyema, hysteria, catatonic state, malingering, hypertensive encephalopathy, vasculitis, TTP, DIC      ED Course as of 01/22/23 0716   Sun Jan 22, 2023   0116 EKG          EKG time: 0012  Rhythm/Rate: Sinus rhythm rate 77  P waves and [place]: Normal  QRS, axis: IVCD  ST and T waves: Nonspecific    Interpreted Contemporaneously by me, independently viewed [TJ]   0132 Was spoken with patient's son he states that patient's speech sounds groggy and tired but is not more slurred than baseline. [TJ]   0145 Work-up benign.  Patient is at baseline.  Will admit for TIA work-up. [TJ]      ED Course User Index  [TJ] Miguel Snider MD           PPE: The patient wore a mask and I wore an N95 mask throughout the entire patient encounter.       AS OF 07:16 EST VITALS:    BP - 124/85  HR - 65  TEMP - 97.8 °F (36.6 °C) (Oral)  O2 SATS - 97%        DIAGNOSIS  Final diagnoses:   TIA (transient ischemic attack)         DISPOSITION  ED Disposition     ED Disposition   Decision to Admit    Condition   --    Comment   Level of Care: Telemetry [5]   Diagnosis: TIA (transient ischemic attack) [050716]   Admitting Physician: SHREYA VENEGAS [[id number]]                  Note Disclaimer: At [facility], we believe that sharing information builds trust and better relationships. You are receiving this note because you recently visited [facility]. It is possible you will see health information before a  "provider has talked with you about it. This kind of information can be easy to misunderstand. To help you fully understand what it means for your health, we urge you to discuss this note with your provider.       Miguel Snider MD  01/22/23 0716      Electronically signed by Miguel Snider MD at 01/22/23 0716     Yuli Astorga, RN at 01/22/23 0316          Nursing report ED to floor  Alice Parisi  82 y.o.  female    HPI :   Chief Complaint   Patient presents with   • Speech Problem       Admitting doctor:   Greg Knight MD    Admitting diagnosis:   The encounter diagnosis was TIA (transient ischemic attack).    Code status:   Current Code Status       Date Active Code Status Order ID Comments User Context       1/22/2023 0233 CPR (Attempt to Resuscitate) [id number]  Manuela Morrow APRN ED        Question Answer    Code Status (Patient has no pulse and is not breathing) CPR (Attempt to Resuscitate)    Medical Interventions (Patient has pulse or is breathing) Full Support                    Allergies:   Patient has no known allergies.    Isolation:   No active isolations    Intake and Output  No intake or output data in the 24 hours ending 01/22/23 0316    Weight:       01/21/23 2207   Weight: 104 kg (230 lb)       Most recent vitals:   Vitals:    01/21/23 2206 01/21/23 2207   BP: 141/73    BP Location: Right arm    Patient Position: Lying    Pulse: 64    Resp: 16    Temp: 97.6 °F (36.4 °C)    TempSrc: Oral    SpO2: 98%    Weight:  104 kg (230 lb)   Height:  160 cm (63\")       Active LDAs/IV Access:   Lines, Drains & Airways       Active LDAs       Name Placement date Placement time Site Days    Peripheral IV 01/22/23 0146 Right Antecubital 01/22/23 0146  Antecubital  less than 1                    Labs (abnormal labs have a star):   Labs Reviewed   COMPREHENSIVE METABOLIC PANEL - Abnormal; Notable for the following components:       Result Value    Glucose 199 (*)     BUN/Creatinine Ratio " 29.0 (*)     All other components within normal limits    Narrative:     GFR Normal >60  Chronic Kidney Disease <60  Kidney Failure <15    The GFR formula is only valid for adults with stable renal function between ages 18 and 70.   CBC WITH AUTO DIFFERENTIAL - Abnormal; Notable for the following components:    RDW 11.9 (*)     All other components within normal limits   URINALYSIS W/ MICROSCOPIC IF INDICATED (NO CULTURE) - Abnormal; Notable for the following components:    Leuk Esterase, UA Trace (*)     All other components within normal limits   URINALYSIS, MICROSCOPIC ONLY - Abnormal; Notable for the following components:    Bacteria, UA 3+ (*)     All other components within normal limits   PROTIME-INR - Normal   APTT - Normal   TROPONIN (IN-HOUSE) - Normal    Narrative:     Troponin T Reference Range:  <= 0.03 ng/mL-   Negative for AMI  >0.03 ng/mL-     Abnormal for myocardial necrosis.  Clinicians would have to utilize clinical acumen, EKG, Troponin and serial changes to determine if it is an Acute Myocardial Infarction or myocardial injury due to an underlying chronic condition.       Results may be falsely decreased if patient taking Biotin.     TROPONIN (IN-HOUSE) - Normal    Narrative:     Troponin T Reference Range:  <= 0.03 ng/mL-   Negative for AMI  >0.03 ng/mL-     Abnormal for myocardial necrosis.  Clinicians would have to utilize clinical acumen, EKG, Troponin and serial changes to determine if it is an Acute Myocardial Infarction or myocardial injury due to an underlying chronic condition.       Results may be falsely decreased if patient taking Biotin.     HEMOGLOBIN A1C   LIPID PANEL   BASIC METABOLIC PANEL   CBC WITH AUTO DIFFERENTIAL   POCT GLUCOSE FINGERSTICK   POCT GLUCOSE FINGERSTICK   POCT GLUCOSE FINGERSTICK   POCT GLUCOSE FINGERSTICK   POCT GLUCOSE FINGERSTICK   POCT GLUCOSE FINGERSTICK   POCT GLUCOSE FINGERSTICK   CBC AND DIFFERENTIAL    Narrative:     The following orders were created  for panel order CBC & Differential.  Procedure                               Abnormality         Status                     ---------                               -----------         ------                     CBC Auto Differential[[id number]]        Abnormal            Final result                 Please view results for these tests on the individual orders.       EKG:   ECG 12 Lead Stroke Evaluation   Preliminary Result   HEART RATE= 77  bpm   RR Interval= 779  ms   OH Interval= 137  ms   P Horizontal Axis= -23  deg   P Front Axis= 79  deg   QRSD Interval= 141  ms   QT Interval= 416  ms   QRS Axis= 9  deg   T Wave Axis= 175  deg   - ABNORMAL ECG -   Sinus rhythm   IVCD, consider atypical LBBB   Baseline wander in lead(s) V1   Electronically Signed By:    Date and Time of Study: 2023-01-22 00:12:56          Meds given in ED:   Medications   sodium chloride 0.9 % flush 10 mL (has no administration in time range)   sodium chloride 0.9 % flush 10 mL (has no administration in time range)   sodium chloride 0.9 % flush 10 mL (has no administration in time range)   sodium chloride 0.9 % infusion 40 mL (has no administration in time range)   atorvastatin (LIPITOR) tablet 80 mg (has no administration in time range)   dextrose (GLUTOSE) oral gel 15 g (has no administration in time range)   dextrose (D50W) (25 g/50 mL) IV injection 25 g (has no administration in time range)   glucagon (human recombinant) (GLUCAGEN DIAGNOSTIC) injection 1 mg (has no administration in time range)   sodium chloride 0.9 % infusion (has no administration in time range)   acetaminophen (TYLENOL) tablet 650 mg (has no administration in time range)     Or   acetaminophen (TYLENOL) 160 MG/5ML solution 650 mg (has no administration in time range)     Or   acetaminophen (TYLENOL) suppository 650 mg (has no administration in time range)   insulin lispro (ADMELOG) injection 0-7 Units (has no administration in time range)   ondansetron (ZOFRAN) injection 4  mg (has no administration in time range)   aspirin tablet 325 mg (has no administration in time range)     Or   aspirin suppository 300 mg (has no administration in time range)       Imaging results:  CT Head Without Contrast    Result Date: 2023  No acute intracranial findings.  Radiation dose reduction techniques were utilized, including automated exposure control and exposure modulation based on body size.  This report was finalized on 2023 12:47 AM by Dr. Obdulia Hernandez M.D.       Ambulatory status:   - Assist X2, Wheelchair usually    Social issues:   Social History     Socioeconomic History   • Marital status:    Tobacco Use   • Smoking status: Never   • Smokeless tobacco: Never   Substance and Sexual Activity   • Alcohol use: No   • Drug use: Defer   • Sexual activity: Defer       NIH Stroke Scale:         Yuli Astorga RN  23 03:16 EST         Electronically signed by Yuli Astorga RN at 23 0316              Consult Notes (last 48 hours)      Jese Deutsch MD at 23 1248      Consult Orders    1. Inpatient Neurology Consult Stroke [[id number]] ordered by Manuela Morrow APRN at 23 0232               Stroke Consult Note    Patient Name: Alice Parisi   MRN: [id number]  Age: 82 y.o.  Sex: female  : 1940    Primary Care Physician: Geronimo Fischer MD  Referring Physician:  Greg Knight MD    Handedness: Right  Race: White    Chief Complaint/Reason for Consultation: Slurred speech     Subjective .  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  Per patient, her slurred speech started after she got some medication at the facility, and believes that is what caused  her to have slurred speech.  There was no other symptoms of focal weakness/numbness/vision changes/word finding difficulty.  She is back to her baseline.    Last Known Normal Date/Time: 9 PM EST     Review of Systems   Neurological: Positive for speech difficulty.   All other systems reviewed and are negative.     Past Medical History:   Diagnosis Date   • Arthritis    • Depression    • Diabetes mellitus (HCC)    • Hyperlipidemia    • Hypertension    • Incontinence    • Insomnia    • Obesity    • Postpolio syndrome    • Sleep apnea    • UTI (urinary tract infection)      Past Surgical History:   Procedure Laterality Date   • APPENDECTOMY     • CATARACT EXTRACTION     • EYELID LACERATION REPAIR     • GALLBLADDER SURGERY     • HYSTERECTOMY     • KNEE SURGERY     • LUMBAR DISC SURGERY       Family History   Problem Relation Age of Onset   • Diabetes Mother    • Diabetes Brother    • Heart disease Brother      Social History     Socioeconomic History   • Marital status:    Tobacco Use   • Smoking status: Never   • Smokeless tobacco: Never   Vaping Use   • Vaping Use: Never used   Substance and Sexual Activity   • Alcohol use: No   • Drug use: Never   • Sexual activity: Not Currently     Allergies   Allergen Reactions   • Shellfish-Derived Products Swelling     Prior to Admission medications    Medication Sig Start Date End Date Taking? Authorizing Provider   ACCU-CHEK PARRIS PLUS test strip USE TO TEST TWICE A DAY DX: TYPE 2 DIABETES MELLITUS ICD 10: E11.9 3/2/20  Yes Geronimo Fischer MD   Accu-Chek FastClix Lancets misc USE TO TEST TWICE A DAY 12/29/20  Yes Jacob Persaud MD   Blood Glucose Monitoring Suppl (ACCU-CHEK PARRIS PLUS) w/Device kit Use to test twice a Day for Type 2 Diabetes ICD 10 E11.9 Pt's machine broke this is a replacement machine 3/2/20  Yes Geronimo Fischer MD   citalopram (CeleXA) 10 MG tablet Take 1 tablet by mouth Daily. 1/13/20  Yes Geronimo Fischer MD   furosemide (LASIX) 20 MG  tablet Take 1 tablet by mouth Daily. 2/21/19  Yes Geronimo Fischer MD   HYDROcodone-acetaminophen (NORCO) 5-325 MG per tablet Take 1 tablet by mouth Every 6 (Six) Hours As Needed for Mild Pain or Moderate Pain.   Yes Lisette Cook MD   levothyroxine (SYNTHROID, LEVOTHROID) 50 MCG tablet Take 1 tablet by mouth Daily. 1/13/20  Yes Geronimo Fischer MD   losartan (COZAAR) 50 MG tablet Take 1 tablet by mouth Daily.  Patient taking differently: Take 75 mg by mouth Daily. 6/4/20  Yes Geronimo Fischer MD   melatonin 1 MG tablet Take 2 mg by mouth At Night As Needed for Sleep.   Yes Lisette Cook MD   metFORMIN (GLUCOPHAGE) 500 MG tablet Take 1 tablet by mouth Daily With Breakfast. 1/13/20  Yes Geronimo Fischer MD   mupirocin (BACTROBAN) 2 % ointment Apply 1 application topically to the appropriate area as directed Daily. Apply to open area on left thigh   Yes ProviderLisette MD   oxybutynin (DITROPAN) 5 MG tablet Take 5 mg by mouth 3 (Three) Times a Day.   Yes ProviderLisette MD   simvastatin (ZOCOR) 40 MG tablet Take 1 tablet by mouth Every Night.  Patient taking differently: Take 20 mg by mouth Every Night. 1/13/20  Yes Geronimo Fischer MD   atenolol (TENORMIN) 25 MG tablet Take 1 tablet by mouth 2 (Two) Times a Day. 1/13/20   Geronimo Fischer MD   hydroCHLOROthiazide (HYDRODIURIL) 12.5 MG tablet Take 1 tablet by mouth Daily. 11/26/19   Geronimo Fischer MD   Hydrocortisone (Geena's magic butt cream) Apply 1 application topically to the appropriate area as directed As Needed (infection/discomfort). 1/15/21   Geronimo Fischer MD   meloxicam (Mobic) 7.5 MG tablet Take 1 tablet by mouth Daily. 9/22/20   Arti Benítez APRN   nystatin (MYCOSTATIN) 999767 UNIT/GM cream Apply 1 application topically to the appropriate area as directed As Needed.    Provider, MD Lisette            Objective     Temp:  [97.6 °F (36.4 °C)-98 °F (36.7 °C)] 98 °F (36.7 °C)  Heart Rate:  [61-74]  65  Resp:  [16-18] 18  BP: (124-164)/(62-85) 164/76  Neurological Exam  Mental Status  Awake, alert and oriented to person, place and time.Alert. Speech is normal. Language is fluent with no aphasia.    Cranial Nerves  CN II: Visual fields full to confrontation.  CN III, IV, VI: Extraocular movements intact bilaterally. Normal lids and orbits bilaterally. Pupils equal round and reactive to light bilaterally.  CN V: Facial sensation is normal.  CN VII: Full and symmetric facial movement.  CN IX, X: Palate elevates symmetrically  CN XI: Shoulder shrug strength is normal.  CN XII: Tongue midline without atrophy or fasciculations.    Motor  Normal muscle bulk throughout. No fasciculations present. Normal muscle tone.  Left lower extremity is 2/5, all other extremities are 5/5 this is baseline.    Sensory  Light touch is normal in upper and lower extremities.     Coordination    No dysmetria.    Gait    Not assessed.      Physical Exam  Vitals and nursing note reviewed.   Constitutional:       Appearance: Normal appearance. She is obese.   HENT:      Head: Normocephalic and atraumatic.   Eyes:      General: Lids are normal.      Extraocular Movements: Extraocular movements intact.      Pupils: Pupils are equal, round, and reactive to light.   Cardiovascular:      Rate and Rhythm: Normal rate and regular rhythm.   Pulmonary:      Effort: Pulmonary effort is normal. No respiratory distress.   Musculoskeletal:      Cervical back: Normal range of motion and neck supple.   Neurological:      Mental Status: She is alert.   Psychiatric:         Mood and Affect: Mood normal.         Speech: Speech normal.         Behavior: Behavior normal.         Acute Stroke Data    IV Thrombolytic (TPA/Tenecteplase) Inclusion / Exclusion Criteria    Time: 12:48 EST  Person Administering Scale: Jese Deutsch MD    Inclusion Criteria  [x]   18 years of age or greater   []   Onset of symptoms < 4.5 hours before beginning treatment (stroke  onset = time patient was last seen well or without symptoms).   []   Diagnosis of acute ischemic stroke causing measurable disabling deficit (Complete Hemianopia, Any Aphasia, Visual or Sensory Extinction, Any weakness limiting sustained effort against gravity)   []   Any remaining deficit considered potentially disabling in view of patient and practitioner   Exclusion criteria (Do not proceed with Alteplase if any are checked under exclusion criteria)  []   Onset unknown or GREATER than 4.5 hours   []   ICH on CT/MRI   []   CT demonstrates hypodensity representing acute or subacute infarct   []   Significant head trauma or prior stroke in the previous 3 months   []   Symptoms suggestive of subarachnoid hemorrhage   []   History of un-ruptured intracranial aneurysm GREATER than 10 mm   []   Recent intracranial or intraspinal surgery within the last 3 months   []   Arterial puncture at a non-compressible site in the previous 7 days   []   Active internal bleeding   []   Acute bleeding tendency   []   Platelet count LESS than 100,000 for known hematological diseases such as leukemia, thrombocytopenia or chronic cirrhosis   []   Current use of anticoagulant with INR GREATER than 1.7 or PT GREATER than 15 seconds, aPTT GREATER than 40 seconds   []   Heparin received within 48 hours, resulting in abnormally elevated aPTT GREATER than upper limit of normal   []   Current use of direct thrombin inhibitors or direct factor Xa inhibitors in the past 48 hours   []   Elevated blood pressure refractory to treatment (systolic GREATER than 185 mm/Hg or diastolic  GREATER than 110 mm/Hg   []   Suspected infective endocarditis and aortic arch dissection   []   Current use of therapeutic treatment dose of low-molecular-weight heparin (LMWH) within the previous 24 hours   []   Structural GI malignancy or bleed   Relative exclusion for all patients  []   Only minor nondisabling symptoms   []   Pregnancy   []   Seizure at onset with  postictal residual neurological impairments   []   Major surgery or previous trauma within past 14 days   []   History of previous spontaneous ICH, intracranial neoplasm, or AV malformation   []   Postpartum (within previous 14 days)   []   Recent GI or urinary tract hemorrhage (within previous 21 days)   []   Recent acute MI (within previous 3 months)   []   History of unruptured intracranial aneurysm LESS than 10 mm   []   History of ruptured intracranial aneurysm   []   Blood glucose LESS than 50 mg/dL (2.7 mmol/L)   []   Dural puncture within the last 7 days   []   Known GREATER than 10 cerebral microbleeds   Additional exclusions for patients with symptoms onset between 3 and 4.5 hours.  []   Age > 80.   []   On any anticoagulants regardless of INR  >>> Warfarin (Coumadin), Heparin, Enoxaparin (Lovenox), fondaparinux (Arixtra), bivalirudin (Angiomax), Argatroban, dabigatran (Pradaxa), rivaroxaban (Xarelto), or apixaban (Eliquis)   []   Severe stroke (NIHSS > 25).   []   History of BOTH diabetes and previous ischemic stroke.   []   The risks and benefits have been discussed with the patient or family related to the administration of IV alteplase for stroke symptoms.   []   I have discussed and reviewed the patient's case and imaging with the attending prior to IV Thrombolytic (TPA/Tenecteplase).    Time Thrombolytic administered   Symptoms resolved    Hospital Meds:  Scheduled- aspirin, 325 mg, Oral, Daily   Or  aspirin, 300 mg, Rectal, Daily  atenolol, 25 mg, Oral, BID  atorvastatin, 80 mg, Oral, Nightly  citalopram, 10 mg, Oral, Daily  [START ON 1/23/2023] furosemide, 20 mg, Oral, Daily  insulin lispro, 0-7 Units, Subcutaneous, TID AC  levothyroxine, 50 mcg, Oral, Daily  losartan, 50 mg, Oral, Daily  oxybutynin, 5 mg, Oral, TID  sodium chloride, 10 mL, Intravenous, Q12H      Infusions- sodium chloride, 100 mL/hr, Last Rate: 100 mL/hr (01/22/23 0613)       PRNs- •  acetaminophen **OR** acetaminophen **OR**  acetaminophen  •  dextrose  •  dextrose  •  glucagon (human recombinant)  •  HYDROcodone-acetaminophen  •  melatonin  •  ondansetron  •  [COMPLETED] Insert Peripheral IV **AND** sodium chloride  •  sodium chloride  •  sodium chloride    Functional Status Prior to Current Stroke/Thatcher Score: 0    NIH Stroke Scale  Time: 12:48 EST  Person Administering Scale: Jese Deutsch MD    1a  Level of consciousness: 0=alert; keenly responsive   1b. LOC questions:  0=Performs both tasks correctly   1c. LOC commands: 0=Performs both tasks correctly   2.  Best Gaze: 0=normal   3.  Visual: 0=No visual loss   4. Facial Palsy: 0=Normal symmetric movement   5a.  Motor left arm: 0=No drift, limb holds 90 (or 45) degrees for full 10 seconds   5b.  Motor right arm: 0=No drift, limb holds 90 (or 45) degrees for full 10 seconds   6a. motor left leg: 3=No effort against gravity, limb falls   6b  Motor right le=No drift, limb holds 90 (or 45) degrees for full 10 seconds   7. Limb Ataxia: 0=Absent   8.  Sensory: 0=Normal; no sensory loss   9. Best Language:  0=No aphasia, normal   10. Dysarthria: 0=Normal   11. Extinction and Inattention: 0=No abnormality    Total:   3       Results Reviewed:  I have personally reviewed current lab, radiology, and data   MRI brain is negative for any acute finding, mild to moderate white matter disease, no hemorrhage  Labs were reviewed             Assessment/Plan:      1. Slurred speech.  Unclear etiology.  Her MRI brain is negative for any acute finding, given her risk factors she could have a small TIA.  Recommend her to be on aspirin 325 mg and continue home dose of simvastatin 40 mg daily.  Recommend CT angiogram of head and neck and 2D echocardiogram, which if looks negative, she can be discharged back to her facility.  2. Essential hypertension.  Normal blood pressure goals.  No need for permissive hypertension.  3. Diabetes mellitus type 2 uncontrolled with hyperglycemia.  A1c is 8.4, goal of  less than 7.  4. Dyslipidemia.  Relatively well controlled.  Continue home dose of statins.  5. Increase activity as tolerated.    Case was discussed with patient, her family, nursing and will talk to the primary team.  Thank you for the consult.          Jese Deutsch MD  January 22, 2023  12:48 EST                Electronically signed by Jese Deutsch MD at 01/22/23 9225

## 2023-01-23 NOTE — PROGRESS NOTES
"    DAILY PROGRESS NOTE  [facility]    Patient Identification:  Name: Alice Parisi  Age: 82 y.o.  Sex: female  :  1940  MRN: [id number]         Primary Care Physician: Geronimo Fischer MD    Subjective:  Interval History: Patient reads lips but she is not really complaining of too much at this time.  Denies any headache if she does not feel that she is having problems swallowing despite what RN is seen at bedside.  No issues with nausea or vomiting.  No new focal loss of function noted    Objective: No family present.  Case discussed with managing RN    Scheduled Meds:aspirin, 325 mg, Oral, Daily   Or  aspirin, 300 mg, Rectal, Daily  atenolol, 25 mg, Oral, BID  atorvastatin, 80 mg, Oral, Nightly  citalopram, 10 mg, Oral, Daily  furosemide, 20 mg, Oral, Daily  insulin lispro, 0-7 Units, Subcutaneous, TID AC  levothyroxine, 50 mcg, Oral, Daily  losartan, 50 mg, Oral, Daily  oxybutynin, 5 mg, Oral, TID  sodium chloride, 10 mL, Intravenous, Q12H      Continuous Infusions:sodium chloride, 100 mL/hr, Last Rate: 100 mL/hr (23 0613)        Vital signs in last 24 hours:  Temp:  [97.3 °F (36.3 °C)-98.2 °F (36.8 °C)] 97.5 °F (36.4 °C)  Heart Rate:  [60-78] 66  Resp:  [17-18] 18  BP: (116-154)/(44-60) 131/46    Intake/Output:    Intake/Output Summary (Last 24 hours) at 2023 1211  Last data filed at 2023 0532  Gross per 24 hour   Intake --   Output 2000 ml   Net -2000 ml       Exam:  /46 (BP Location: Right arm, Patient Position: Lying)   Pulse 66   Temp 97.5 °F (36.4 °C) (Oral)   Resp 18   Ht 160 cm (63\")   Wt 103 kg (227 lb)   SpO2 96%   BMI 40.21 kg/m²     General Appearance:    Alert, cooperative/follows commands, oriented x3, speech seems fluent to me                          Head:    Normocephalic, without obvious abnormality, atraumatic                           Eyes:    PERRL, conjunctivae/corneas clear, EOM's intact, both eyes                         Throat:   Oral " mucosa pink and moist                           Neck:   No JVD                         Lungs:    Clear to auscultation bilaterally, respirations unlabored                          Heart:    Regular rate and rhythm, S1 and S2 normal                  Abdomen:     Soft, nontender, bowel sounds active                 Extremities: Moving all, no cyanosis or edema                        Pulses:   Pulses palpable in all extremities                            Skin:   Skin is warm and dry                  Neurologic:   CNII-XII intact     Data Review:  Labs in chart were reviewed.    Assessment:  Active Hospital Problems    Diagnosis  POA   • **TIA (transient ischemic attack) [G45.9]  Unknown   • HTN (hypertension), benign [I10]  Yes   • DM2 (diabetes mellitus, type 2) (HCC) [E11.9]  Yes   • Hypothyroid [E03.9]  Yes   • OAB (overactive bladder) [N32.81]  Yes      Resolved Hospital Problems   No resolved problems to display.       Plan:    MRI negative for acute stroke with chronic changes/atrophy noted -this patient has significant hearing loss and ultimately reads lips so not as concerned about the idea of slurred speech    Neurology consulted and further checking CTA/echo for slurred speech of unclear etiology.  Patient certainly has the comorbidities to contribute to the above    RN noticing coughing with any p.o. with clearing of throat issues.  Patient evaluated by speech with diet advanced and perhaps while we have the patient here if we are able to proceed with a VFSS then that might be best but ultimately this can be performed in an outpatient setting if unable to logistically    HTN well-controlled    DM2 with an A1c of 8.4    Therapeutic TSH/hypothyroid        Disposition -awaiting the above studies but if nothing new was found, I anticipate we will be transitioning back tomorrow    Bhaskar Pena MD  1/23/2023  12:11 EST

## 2023-01-23 NOTE — PROGRESS NOTES
[facility] Acute Inpt Rehab Note     Referral received via stroke order set.   MRI negative for acute findings.  Will go ahead and sign off at this time.  Should patient display needs for our services, please call our office at [phone], to initiate a full referral once therapies begin and diagnosis made.    Thank you,   Юлия Rossi, RN   Rehab Admission Nurse

## 2023-01-23 NOTE — PLAN OF CARE
Goal Outcome Evaluation:  Plan of Care Reviewed With: patient        Progress: no change  Outcome Evaluation: Bedside swallow re-evaluation completed. New orders received d/t pt coughing/choking with thin liquids per RN. Upon re-eval with pt, endorses “frequent” choking up to 3-4x weekly with meals and thin liquids by cup at [facility]. Pt reports choking inconsistent throughout life (hx Post Polio), but has increased drastically “over the last few months.” Consumes regular solids, mech soft solids, puree solids, and thins by cup/straw. Oral phase functional. Missing mandibular molars do not impede mastication. Adequate labial seal in all methods. Impulsivity in bolus size and rate across all presentation method, increased with straw sips, consumes 50% 10 oz cup (approximately 5 oz) in x1 opportunity. Pharyngeal phase with cough with thin wash by straw s/p regular solids, thin by straw in isolation 1/3 opportunities, 33%. Pt reports inabilities to consumes breads, pasta, meats, globus sensation at thyroid notch, question if GERD impacting or coughing observed by providers may be related to potential esophageal dysphagia if occurring s/p PO.     Recommend: continue regular and thin liquids, no straws. Medications: with thin liquids, 1 at a time, no straws. Compensations: small bites/sips, upright for all PO, no straws, distant supervise for impulsivity.     Can proceed with instrumental next date to rule out pharyngeal impairment, pt in agreement for participation. Will attempt completion prior to discharge, if this cannot be achieved, could additionally complete on OP basis.

## 2023-01-23 NOTE — PLAN OF CARE
Goal Outcome Evaluation:  Plan of Care Reviewed With: patient        Progress: no change   VSS on RA. No c/o pain. IVF continue.

## 2023-01-23 NOTE — PROGRESS NOTES
"DOS: 2023  NAME: Alice Parisi   : 1940  PCP: Geronimo Fischer MD  Chief Complaint   Patient presents with   • Speech Problem     Stroke    Subjective: States slurred speech has resolved to baseline.  No headache.  Reports right neck discomfort which she attributes to stress, not new.  No family at the bedside.Pt seen in follow up today, however the problem is new to the examiner.      Objective:  Vital signs: /46 (BP Location: Right arm, Patient Position: Lying)   Pulse 66   Temp 97.5 °F (36.4 °C) (Oral)   Resp 18   Ht 160 cm (63\")   Wt 103 kg (227 lb)   SpO2 96%   BMI 40.21 kg/m²       General appearance: Well developed, well nourished, alert and cooperative.   HEENT: Normocephalic.   Neck: Supple.   Cardiac: Regular rate and rhythm. No murmurs.   Chest Exam: Clear to auscultation bilaterally, no wheezes, no rhonchi.  Extremities: Normal, no edema.   Skin: No rashes or birthmarks.     Higher integrative function: Awake/alert, oriented to self, month/year, and location.  Follows multiple step commands.  Speech fluent.  CN II: Visual fields intact.  CN III IV VI: Extraocular movements are full without nystagmus. Pupils are equal, round, and reactive to light.   CN V: Normal facial sensation.  CN VII: Facial movements are symmetric, no weakness.   CN VIII: Auditory acuity is decreased.  CN IX & X: Symmetric palatal movement.   CN XI: Sternocleidomastoid and trapezius are normal.   CN XII: The tongue is midline.   Motor: Right arm and leg 5 out of 5, left arm 5 out of 5, left leg 2 out of 5.  Normal movements.  Sensation: Intact/symmetric to light touch in arms and legs.  Station and gait: Deferred.  Coordination: Finger to nose test showed no dysmetria.    Scheduled Meds:aspirin, 325 mg, Oral, Daily   Or  aspirin, 300 mg, Rectal, Daily  atenolol, 25 mg, Oral, BID  atorvastatin, 80 mg, Oral, Nightly  citalopram, 10 mg, Oral, Daily  furosemide, 20 mg, Oral, Daily  insulin lispro, 0-7 Units, " Subcutaneous, TID AC  levothyroxine, 50 mcg, Oral, Daily  losartan, 50 mg, Oral, Daily  oxybutynin, 5 mg, Oral, TID  sodium chloride, 10 mL, Intravenous, Q12H      Continuous Infusions:sodium chloride, 100 mL/hr, Last Rate: 100 mL/hr (01/22/23 0613)      PRN Meds:.•  acetaminophen **OR** acetaminophen **OR** acetaminophen  •  dextrose  •  dextrose  •  glucagon (human recombinant)  •  HYDROcodone-acetaminophen  •  melatonin  •  ondansetron  •  [COMPLETED] Insert Peripheral IV **AND** sodium chloride  •  sodium chloride  •  sodium chloride    Laboratory results:  Lab Results   Component Value Date    GLUCOSE 137 (H) 01/23/2023    CALCIUM 8.3 (L) 01/23/2023     01/23/2023    K 4.2 01/23/2023    CO2 24.0 01/23/2023     (H) 01/23/2023    BUN 15 01/23/2023    CREATININE 0.46 (L) 01/23/2023    EGFRIFAFRI 117 11/07/2019    EGFRIFNONA 96 11/07/2019    BCR 32.6 (H) 01/23/2023    ANIONGAP 6.0 01/23/2023     Lab Results   Component Value Date    WBC 9.31 01/23/2023    HGB 12.2 01/23/2023    HCT 36.0 01/23/2023    MCV 90.9 01/23/2023     01/23/2023     Lab Results   Component Value Date    CHOL 151 01/22/2023     Lab Results   Component Value Date    HDL 56 01/22/2023    HDL 71 (H) 11/07/2019    HDL 76 (H) 06/24/2019     Lab Results   Component Value Date    LDL 75 01/22/2023    LDL 69 11/07/2019    LDL 77 06/24/2019     Lab Results   Component Value Date    TRIG 113 01/22/2023    TRIG 99 11/07/2019    TRIG 104 06/24/2019         Lab 01/22/23  0448   HEMOGLOBIN A1C 8.40*   ECG 1/21 tracings viewed by me, shows normal sinus rhythm.  Review and interpretation of imaging: MR brain images viewed by me, no obvious acute stroke seen.    Impression:  82-year-old female with HTN,, HLD, DM, postpolio syndrome causing left leg weakness, ABBY, and arthritis, wheelchair-bound for 20+ years, living in a nursing facility for the last 2 years after having COVID with associated generalized weakness, who was admitted 1/22 after  one of her neighbors noted she had slurred speech.  Patient states slurred speech started after she had gotten medication at her facility.  There was no associated focal weakness, numbness, or vision change.  She believes she is back to baseline.  On arrival BP was 124/85.  She has been afebrile.  She was on Zocor 40 mg daily but no antiplatelet prior to arrival.    Work-up:  ECG: NSR  CT head: No acute findings  MRI brain without: Mild diffuse atrophy and chronic small vessel disease but no acute stroke.  2D echo: EF 53%, normal left atrial cavity in size.  Saline test result negative.  Moderate calcification of the aortic valve with mild aortic valve stenosis.  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  Discussed with Dr. Ospina on today.

## 2023-01-23 NOTE — THERAPY RE-EVALUATION
Acute Care - Speech Language Pathology   Swallow Re-Evaluation [facility]     Patient Name: Alice Parisi  : 1940  MRN: [id number]  Today's Date: 2023               Admit Date: 2023    Visit Dx:     ICD-10-CM ICD-9-CM   1. TIA (transient ischemic attack)  G45.9 435.9     Patient Active Problem List   Diagnosis   • DM2 (diabetes mellitus, type 2) (HCC)   • Generalized OA   • HTN (hypertension), benign   • Hypothyroid   • Impaired mobility   • Obesity   • Post-polio syndrome   • Hyperlipidemia   • Depression   • OAB (overactive bladder)   • Foot pain, left   • History of gout   • Dependent edema   • TIA (transient ischemic attack)     Past Medical History:   Diagnosis Date   • Arthritis    • Depression    • Diabetes mellitus (HCC)    • Hyperlipidemia    • Hypertension    • Incontinence    • Insomnia    • Obesity    • Postpolio syndrome    • Sleep apnea    • UTI (urinary tract infection)      Past Surgical History:   Procedure Laterality Date   • APPENDECTOMY     • CATARACT EXTRACTION     • EYELID LACERATION REPAIR     • GALLBLADDER SURGERY     • HYSTERECTOMY     • KNEE SURGERY     • LUMBAR DISC SURGERY         SLP Recommendation and Plan  Recommend: continue regular and thin liquids, no straws. Medications: with thin liquids, 1 at a time, no straws. Compensations: small bites/sips, upright for all PO, no straws, distant supervise for impulsivity.       Can proceed with instrumental next date to rule out pharyngeal impairment, pt in agreement for participation. Will attempt completion prior to discharge, if this cannot be achieved, could additionally complete on OP basis.      SWALLOW EVALUATION (last 72 hours)     SLP Adult Swallow Evaluation     Row Name 23 1400       Rehab Evaluation    Document Type re-evaluation  -AB    Subjective Information no complaints  -AB    Patient Observations alert;cooperative;agree to therapy  -AB    Patient/Family/Caregiver Comments/Observations pt seen in room  [address], [place] but pleasant and cooperative  -AB    Patient Effort excellent  -AB    Symptoms Noted During/After Treatment none  -AB       Pain    Additional Documentation Pain Scale: Numbers Pre/Post-Treatment (Group)  -AB       Pain Scale: Numbers Pre/Post-Treatment    Pretreatment Pain Rating 0/10 - no pain  -AB    Posttreatment Pain Rating 0/10 - no pain  -AB                                        User Key  (r) = Recorded By, (t) = Taken By, (c) = Cosigned By    Initials Name Effective Dates    AB Yuli Addison, MS CCC-SLP 12/27/22 -                 EDUCATION  The patient has been educated in the following areas:   Dysphagia (Swallowing Impairment) Modified Diet Instruction.        SLP GOALS     Row Name 01/23/23 1400       (LTG) Patient will demonstrate functional swallow for    Diet Texture (Demonstrate functional swallow) regular textures  -AB    Liquid viscosity (Demonstrate functional swallow) thin liquids  -AB    [place] (Demonstrate functional swallow) independently (over 90% accuracy)  -AB    Time Frame (Demonstrate functional swallow) by discharge  -AB    Progress/Outcomes (Demonstrate functional swallow) continuing progress toward goal  -AB    Comment (Demonstrate functional swallow) Bedside swallow re-evaluation completed. New orders received d/t pt coughing/choking with thin liquids per RN. Upon re-eval with pt, endorses “frequent” choking up to 3-4x weekly with meals and thin liquids by cup at [facility]. Pt reports choking inconsistent throughout life (hx Post Polio), but has increased drastically “over the last few months.” Consumes regular solids, mech soft solids, puree solids, and thins by cup/straw. Oral phase functional. Missing mandibular molars do not impede mastication. Adequate labial seal in all methods. Impulsivity in bolus size and rate across all presentation method, increased with straw sips, consumes 50% 10 oz cup (approximately 5 oz) in x1 opportunity. Pharyngeal phase  with cough with thin wash by straw s/p regular solids, thin by straw in isolation 1/3 opportunities, 33%. Pt reports inabilities to consumes breads, pasta, meats, globus sensation at thyroid notch, question if GERD impacting or coughing observed by providers may be related to potential esophageal dysphagia if occurring s/p PO.   Recommend: continue regular and thin liquids, no straws. Medications: with thin liquids, 1 at a time, no straws. Compensations: small bites/sips, upright for all PO, no straws, distant supervise for impulsivity.   Can proceed with instrumental next date to rule out pharyngeal impairment, pt in agreement for participation. Will attempt completion prior to discharge, if this cannot be achieved, could additionally complete on OP basis.  -AB          User Key  (r) = Recorded By, (t) = Taken By, (c) = Cosigned By    Initials Name Provider Type    Yuli Jessica MS CCC-SLP Speech and Language Pathologist                Time Calculation:    Time Calculation- SLP     Row Name 01/23/23 1430             Time Calculation- SLP    SLP Received On 01/23/23  -AB            User Key  (r) = Recorded By, (t) = Taken By, (c) = Cosigned By    Initials Name Provider Type    Yuli Jessica, MS CCC-SLP Speech and Language Pathologist                Therapy Charges for Today     Code Description Service Date Service Provider Modifiers Qty    [id number]  ST TREATMENT SWALLOW 4 1/23/2023 Yuli Addison, MS CCC-SLP GN 1               Yuli Addison MS CCC-SLP  1/23/2023

## 2023-01-23 NOTE — DISCHARGE PLACEMENT REQUEST
"Alice Ruiz (82 y.o. Female)     Date of Birth   1940    Social Security Number       Address   [address] [place] [address]    Home Phone   [phone]    MRN   [id number]       [facility]    Marital Status                               Admission Date   1/22/23    Admission Type   Emergency    Admitting Provider   Raad Cox MD    Attending Provider   Bhaskar Pena MD    Department, Room/Bed   [address], S515/1       Discharge Date       Discharge Disposition       Discharge Destination                               Attending Provider: Bhaskar Pena MD    Allergies: Shellfish-derived Products    Isolation: None   Infection: None   Code Status: CPR    Ht: 160 cm (63\")   Wt: 103 kg (227 lb)    Admission Cmt: None   Principal Problem: TIA (transient ischemic attack) [G45.9]                 Active Insurance as of 1/22/2023     Primary Coverage     Payor Plan Insurance Group Employer/Plan Group    SIGNATURE MEDICARE ADVANTAGE SIGNATURE ADVANTAGE NGN     Payor Plan Address Payor Plan Phone Number Payor Plan Fax Number Effective Dates    [address] [address] [phone]  1/2/2023 - None Entered    [facility] [id number]       Subscriber Name Subscriber Birth Date Member ID       ALICE RUIZ 1940 [id number]                 Emergency Contacts      (Rel.) Home Phone Work Phone Mobile Phone    Hu Ruiz (Son) -- -- [phone]    Roman Ruiz (Son) [phone] -- --    Cristina Ruiz (Other) [phone] -- --              "

## 2023-01-24 ENCOUNTER — APPOINTMENT (OUTPATIENT)
Dept: GENERAL RADIOLOGY | Facility: HOSPITAL | Age: 83
End: 2023-01-24
Payer: MEDICARE

## 2023-01-24 VITALS
SYSTOLIC BLOOD PRESSURE: 155 MMHG | HEIGHT: 63 IN | RESPIRATION RATE: 16 BRPM | BODY MASS INDEX: 40.22 KG/M2 | WEIGHT: 227 LBS | OXYGEN SATURATION: 97 % | HEART RATE: 55 BPM | TEMPERATURE: 97.6 F | DIASTOLIC BLOOD PRESSURE: 72 MMHG

## 2023-01-24 LAB
GLUCOSE BLDC GLUCOMTR-MCNC: 140 MG/DL (ref 70–130)
GLUCOSE BLDC GLUCOMTR-MCNC: 215 MG/DL (ref 70–130)

## 2023-01-24 PROCEDURE — 82962 GLUCOSE BLOOD TEST: CPT

## 2023-01-24 PROCEDURE — 74230 X-RAY XM SWLNG FUNCJ C+: CPT

## 2023-01-24 PROCEDURE — 92611 MOTION FLUOROSCOPY/SWALLOW: CPT

## 2023-01-24 PROCEDURE — G0378 HOSPITAL OBSERVATION PER HR: HCPCS

## 2023-01-24 PROCEDURE — 63710000001 INSULIN LISPRO (HUMAN) PER 5 UNITS: Performed by: NURSE PRACTITIONER

## 2023-01-24 RX ORDER — ASPIRIN 81 MG/1
81 TABLET, CHEWABLE ORAL DAILY
Qty: 30 TABLET | Refills: 0 | Status: SHIPPED | OUTPATIENT
Start: 2023-01-24

## 2023-01-24 RX ORDER — ASPIRIN 81 MG/1
81 TABLET, CHEWABLE ORAL DAILY
Status: DISCONTINUED | OUTPATIENT
Start: 2023-01-24 | End: 2023-01-24 | Stop reason: HOSPADM

## 2023-01-24 RX ORDER — ATORVASTATIN CALCIUM 20 MG/1
40 TABLET, FILM COATED ORAL NIGHTLY
Status: DISCONTINUED | OUTPATIENT
Start: 2023-01-24 | End: 2023-01-24 | Stop reason: HOSPADM

## 2023-01-24 RX ORDER — HYDROCODONE BITARTRATE AND ACETAMINOPHEN 5; 325 MG/1; MG/1
1 TABLET ORAL EVERY 6 HOURS PRN
Qty: 12 TABLET | Refills: 0 | Status: SHIPPED | OUTPATIENT
Start: 2023-01-24

## 2023-01-24 RX ORDER — ATORVASTATIN CALCIUM 40 MG/1
40 TABLET, FILM COATED ORAL NIGHTLY
Qty: 30 TABLET | Refills: 0 | Status: SHIPPED | OUTPATIENT
Start: 2023-01-24

## 2023-01-24 RX ADMIN — LOSARTAN POTASSIUM 50 MG: 50 TABLET, FILM COATED ORAL at 09:08

## 2023-01-24 RX ADMIN — OXYBUTYNIN CHLORIDE 5 MG: 5 TABLET ORAL at 09:08

## 2023-01-24 RX ADMIN — CITALOPRAM 10 MG: 10 TABLET, FILM COATED ORAL at 09:08

## 2023-01-24 RX ADMIN — BARIUM SULFATE 1 TEASPOON(S): 0.6 CREAM ORAL at 10:15

## 2023-01-24 RX ADMIN — NYSTATIN: 100000 POWDER TOPICAL at 09:09

## 2023-01-24 RX ADMIN — ATENOLOL 25 MG: 25 TABLET ORAL at 09:08

## 2023-01-24 RX ADMIN — BARIUM SULFATE 4 ML: 980 POWDER, FOR SUSPENSION ORAL at 10:15

## 2023-01-24 RX ADMIN — BARIUM SULFATE 50 ML: 400 SUSPENSION ORAL at 10:15

## 2023-01-24 RX ADMIN — FUROSEMIDE 20 MG: 20 TABLET ORAL at 09:08

## 2023-01-24 RX ADMIN — BARIUM SULFATE 55 ML: 0.81 POWDER, FOR SUSPENSION ORAL at 10:15

## 2023-01-24 RX ADMIN — ASPIRIN 81 MG: 81 TABLET, CHEWABLE ORAL at 09:11

## 2023-01-24 RX ADMIN — LEVOTHYROXINE SODIUM 50 MCG: 0.05 TABLET ORAL at 09:11

## 2023-01-24 RX ADMIN — Medication 10 ML: at 09:12

## 2023-01-24 RX ADMIN — INSULIN LISPRO 2 UNITS: 100 INJECTION, SOLUTION INTRAVENOUS; SUBCUTANEOUS at 11:46

## 2023-01-24 NOTE — CASE MANAGEMENT/SOCIAL WORK
Case Management Discharge Note      Final Note: DC back to LTC at [facility]. ........Rose LIGHT/ WINIFRED    Provided Post Acute Provider List?: N/A  Provided Post Acute Provider Quality & Resource List?: N/A    Selected Continued Care - Discharged on 1/24/2023 Admission date: 1/22/2023 - Discharge disposition: Skilled Nursing Facility (DC - External)    Destination Coordination complete.    Service Provider Selected Services Address Phone Fax Patient Preferred    [address] [address] [phone] --          Durable Medical Equipment    No services have been selected for the patient.              Dialysis/Infusion    No services have been selected for the patient.              Home Medical Care    No services have been selected for the patient.              Therapy    No services have been selected for the patient.              Community Resources    No services have been selected for the patient.              Community & DME    No services have been selected for the patient.                  Transportation Services  W/C Van: [facility]

## 2023-01-24 NOTE — NURSING NOTE
Called report to [facility] and spoke with [address] nurse who is familiar with this resident. Patient is set to transport via [facility] at 1400.

## 2023-01-24 NOTE — DISCHARGE SUMMARY
[facility]    Date of Discharge:  1/24/2023    PCP: Geronimo Fischer MD    Discharge Diagnosis:   Active Hospital Problems    Diagnosis  POA   • **TIA (transient ischemic attack) [G45.9]  Unknown   • HTN (hypertension), benign [I10]  Yes   • DM2 (diabetes mellitus, type 2) (HCC) [E11.9]  Yes   • Hypothyroid [E03.9]  Yes   • OAB (overactive bladder) [N32.81]  Yes      Resolved Hospital Problems   No resolved problems to display.          Consults     Date and Time Order Name Status Description    1/22/2023  2:33 AM Inpatient Neurology Consult Stroke Completed     1/22/2023  2:16 AM [facility] (on-call MD unless specified) Details          Hospital Course  82 y.o. female with a past medical history of polio with chronic left-sided weakness and chronic immobility in which she is wheelchair-bound for 20+ years and resides in a nursing facility after having significant worsening in her physical capabilities due to previous COVID infection.  She was sent to this location due to episode of slurred speech.  This patient already has hearing loss that can compound her ability to speak smoothly.  Regardless she was brought in for stroke work-up.  MRI was negative for any acute disease though chronic changes and atrophy was noted.  Neurology further checked a CT angiogram as well as an echocardiogram.  See the report of the CTA as there is a stenotic lesion but neurology does not feel that is contributory to her current condition.  No further RADHA consult was placed.  Patient is to maintain on low-dose aspirin and her Zocor has been changed out for Lipitor at 40 mg by discharge for slurred speech of unknown etiology versus possible TIA. Echocardiogram obtained demonstrated EF of 53% with normal diastolic dysfunction and negative saline test.  See report for further clarification as there is some mild valvular disease.  Due to concerns of swallow safety, speech therapy saw they  recommend regular diet with thin liquids.  Medication with thin liquids 1 at a time and no straws.  They suggest small bites and sips and upright for all p.o. with no straws and distant supervision for impulsivity advised.  HTN is decently controlled and will resume her HCTZ at discharge.  Diabetes is well controlled though her A1c is 8.4.  We utilize sliding scale while here and she can resume her metformin at disposition.  Her hypothyroidism is well controlled on current dose of levothyroxine.  Vital signs are stable as well as afebrile.  Patient clinically feels back to baseline and I think is more than medically stable to transition back to her outlying facility.  All questions answered the patient the best my ability and she is thankful for the care she received while here as well as amenable to the above plan      Temp:  [97.4 °F (36.3 °C)-98 °F (36.7 °C)] 97.6 °F (36.4 °C)  Heart Rate:  [55-58] 55  Resp:  [16-18] 16  BP: (135-156)/(48-72) 155/72  Body mass index is 40.21 kg/m².    Physical Exam  Constitutional:       Comments: Speech seems fluent to me   HENT:      Head: Normocephalic.      Nose: Nose normal.      Mouth/Throat:      Mouth: Mucous membranes are moist.      Pharynx: Oropharynx is clear.   Eyes:      Conjunctiva/sclera: Conjunctivae normal.      Pupils: Pupils are equal, round, and reactive to light.   Cardiovascular:      Rate and Rhythm: Normal rate and regular rhythm.   Pulmonary:      Effort: Pulmonary effort is normal. No respiratory distress.      Breath sounds: Normal breath sounds.   Abdominal:      General: Bowel sounds are normal. There is no distension.      Palpations: Abdomen is soft.      Tenderness: There is no abdominal tenderness.   Musculoskeletal:         General: No swelling.   Neurological:      Mental Status: She is alert and oriented to person, place, and time. Mental status is at baseline.       Disposition: Skilled Nursing Facility (DC - External)       Discharge  Medications      New Medications      Instructions Start Date   aspirin 81 MG chewable tablet   81 mg, Oral, Daily      atorvastatin 40 MG tablet  Commonly known as: LIPITOR   40 mg, Oral, Nightly         Changes to Medications      Instructions Start Date   losartan 50 MG tablet  Commonly known as: COZAAR  What changed: how much to take   50 mg, Oral, Daily         Continue These Medications      Instructions Start Date   Accu-Chek Monica Plus test strip  Generic drug: glucose blood   USE TO TEST TWICE A DAY DX: TYPE 2 DIABETES MELLITUS ICD 10: E11.9      Accu-Chek Monica Plus w/Device kit   Use to test twice a Day for Type 2 Diabetes ICD 10 E11.9 Pt's machine broke this is a replacement machine      Accu-Chek FastClix Lancets misc   USE TO TEST TWICE A DAY      atenolol 25 MG tablet  Commonly known as: TENORMIN   25 mg, Oral, 2 Times Daily      citalopram 10 MG tablet  Commonly known as: CeleXA   10 mg, Oral, Daily      furosemide 20 MG tablet  Commonly known as: LASIX   20 mg, Oral, Daily      hydroCHLOROthiazide 12.5 MG tablet  Commonly known as: HYDRODIURIL   12.5 mg, Oral, Daily      HYDROcodone-acetaminophen 5-325 MG per tablet  Commonly known as: NORCO   1 tablet, Oral, Every 6 Hours PRN      levothyroxine 50 MCG tablet  Commonly known as: SYNTHROID, LEVOTHROID   50 mcg, Oral, Daily      Geena's magic butt cream   1 application, Topical, As Needed      melatonin 1 MG tablet   2 mg, Oral, Nightly PRN      metFORMIN 500 MG tablet  Commonly known as: GLUCOPHAGE   500 mg, Oral, Daily With Breakfast      mupirocin 2 % ointment  Commonly known as: BACTROBAN   1 application, Topical, Daily, Apply to open area on left thigh      nystatin [id number] UNIT/GM cream  Commonly known as: MYCOSTATIN   1 application, Topical, As Needed      oxybutynin 5 MG tablet  Commonly known as: DITROPAN   5 mg, Oral, 3 Times Daily         Stop These Medications    meloxicam 7.5 MG tablet  Commonly known as: Mobic     simvastatin 40 MG  tablet  Commonly known as: ZOCOR             Additional Instructions for the Follow-ups that You Need to Schedule     Discharge Follow-up with PCP   As directed       Currently Documented PCP:    Geronimo Fischer MD    PCP Phone Number:    [phone]     Follow Up Details: PCP 2 to 3 weeks.  Neurology per the recommendations            Follow-up Information     Geronimo Fischer MD .    Specialty: Internal Medicine  Why: PCP 2 to 3 weeks.  Neurology per the recommendations  Contact information:  [id number] [place], [place] [address] [phone]                           Bhaskar Pena MD  [facility]  01/24/23    Discharge time spent greater than 30 minutes.

## 2023-01-24 NOTE — CASE MANAGEMENT/SOCIAL WORK
Discharge Planning Assessment  [facility]     Patient Name: Alice Parisi  MRN: [id number]  Today's Date: 1/24/2023    Admit Date: 1/22/2023    Plan: Return to [facility]   Discharge Needs Assessment    No documentation.                Discharge Plan     Row Name 01/24/23 1746       Plan    Plan Comments DC orders noted.  S/W [facility] - bed remains available.  S/w pt who is in agreement w/ [facility]  DC summary and DC packet given to RN.  [facility] stretcher arranged for today at 1400.  Reservation # [id number].    Final Note DC back [facility]. ........Rose LIGHT/ WINIFRED              Continued Care and Services - Discharged on 1/24/2023 Admission date: 1/22/2023 - Discharge disposition: Skilled Nursing Facility (DC - External)    Destination Coordination complete.    Service Provider Request Status Selected Services Address Phone Fax Patient Preferred    [facility] [facility] [address] [phone] --              Expected Discharge Date and Time     Expected Discharge Date Expected Discharge Time    Jan 24, 2023          Demographic Summary    No documentation.                Functional Status    No documentation.                Psychosocial    No documentation.                Abuse/Neglect    No documentation.                Legal    No documentation.                Substance Abuse    No documentation.                Patient Forms    No documentation.                   Rose Membreno, RN

## 2023-01-24 NOTE — PLAN OF CARE
Problem: Adult Inpatient Plan of Care  Goal: Plan of Care Review  Outcome: Ongoing, Progressing   Goal Outcome Evaluation:  VSS. RA. Q shift NIH. No new events noted overnight. Pt rested throughout the night with no issues or complaints.

## 2023-01-24 NOTE — THERAPY DISCHARGE NOTE
Acute Care - Speech Language Pathology Discharge Summary  [facility]       Patient Name: Alice Parisi  : 1940  MRN: [id number]    Today's Date: 2023                   Admit Date: 2023      SLP Recommendation and Plan    Visit Dx:    ICD-10-CM ICD-9-CM   1. TIA (transient ischemic attack)  G45.9 435.9          Time Calculation- SLP     Row Name 23 1049             Time Calculation- SLP    SLP Start Time 1000  -CB      SLP Received On 23  -CB         Untimed Charges    30351-CZ Motion Fluoro Eval Swallow Minutes 60  -CB         Total Minutes    Untimed Charges Total Minutes 60  -CB       Total Minutes 60  -CB            User Key  (r) = Recorded By, (t) = Taken By, (c) = Cosigned By    Initials Name Provider Type    Aniyah Ross CCC-SLP Speech and Language Pathologist                   SLP GOALS     Row Name 23 1000 23 1400 23 1400       (LTG) Patient will demonstrate functional swallow for    Diet Texture (Demonstrate functional swallow) -- regular textures  -AB regular textures  -NR    Liquid viscosity (Demonstrate functional swallow) -- thin liquids  -AB thin liquids  -NR    [place] (Demonstrate functional swallow) -- independently (over 90% accuracy)  -AB independently (over 90% accuracy)  -NR    Time Frame (Demonstrate functional swallow) -- by discharge  -AB by discharge  -NR    Progress/Outcomes (Demonstrate functional swallow) goal met  -CB continuing progress toward goal  -AB --    Comment (Demonstrate functional swallow) -- Bedside swallow re-evaluation completed. New orders received d/t pt coughing/choking with thin liquids per RN. Upon re-eval with pt, endorses “frequent” choking up to 3-4x weekly with meals and thin liquids by cup at [facility]. Pt reports choking inconsistent throughout life (hx Post Polio), but has increased drastically “over the last few months.” Consumes regular solids, mech soft solids, puree solids, and thins by  cup/straw. Oral phase functional. Missing mandibular molars do not impede mastication. Adequate labial seal in all methods. Impulsivity in bolus size and rate across all presentation method, increased with straw sips, consumes 50% 10 oz cup (approximately 5 oz) in x1 opportunity. Pharyngeal phase with cough with thin wash by straw s/p regular solids, thin by straw in isolation 1/3 opportunities, 33%. Pt reports inabilities to consumes breads, pasta, meats, globus sensation at thyroid notch, question if GERD impacting or coughing observed by providers may be related to potential esophageal dysphagia if occurring s/p PO.   Recommend: continue regular and thin liquids, no straws. Medications: with thin liquids, 1 at a time, no straws. Compensations: small bites/sips, upright for all PO, no straws, distant supervise for impulsivity.   Can proceed with instrumental next date to rule out pharyngeal impairment, pt in agreement for participation. Will attempt completion prior to discharge, if this cannot be achieved, could additionally complete on OP basis.  -AB --          User Key  (r) = Recorded By, (t) = Taken By, (c) = Cosigned By    Initials Name Provider Type    NR Bethanie Stout MA,CCC-SLP Speech and Language Pathologist    AB Yuli Addison, MS CCC-SLP Speech and Language Pathologist    CB Aniyah Espino CCC-SLP Speech and Language Pathologist                  Therapy Charges for Today     Code Description Service Date Service Provider Modifiers Qty    [id number] HC ST MOTION FLUORO EVAL SWALLOW 4 1/24/2023 Aniyah Espino CCC-SLP GN 1            SLP Discharge Summary  Anticipated Discharge Disposition (SLP): skilled nursing facility  Reason for Discharge: all goals and outcomes met, no further needs identified  Progress Toward Achieving Short/long Term Goals: all goals met within established timelines  Discharge Destination: SNF      SHANNON Cotton  1/24/2023
